# Patient Record
Sex: MALE | Race: WHITE | NOT HISPANIC OR LATINO | Employment: OTHER | URBAN - METROPOLITAN AREA
[De-identification: names, ages, dates, MRNs, and addresses within clinical notes are randomized per-mention and may not be internally consistent; named-entity substitution may affect disease eponyms.]

---

## 2018-01-02 ENCOUNTER — RECORDS - HEALTHEAST (OUTPATIENT)
Dept: LAB | Facility: CLINIC | Age: 68
End: 2018-01-02

## 2018-01-02 LAB
ALBUMIN SERPL-MCNC: 3.3 G/DL (ref 3.5–5)
ALP SERPL-CCNC: 80 U/L (ref 45–120)
ALT SERPL W P-5'-P-CCNC: 8 U/L (ref 0–45)
ANION GAP SERPL CALCULATED.3IONS-SCNC: 7 MMOL/L (ref 5–18)
AST SERPL W P-5'-P-CCNC: 13 U/L (ref 0–40)
BILIRUB SERPL-MCNC: 0.2 MG/DL (ref 0–1)
BUN SERPL-MCNC: 20 MG/DL (ref 8–22)
CALCIUM SERPL-MCNC: 9 MG/DL (ref 8.5–10.5)
CHLORIDE BLD-SCNC: 104 MMOL/L (ref 98–107)
CHOLEST SERPL-MCNC: 178 MG/DL
CO2 SERPL-SCNC: 27 MMOL/L (ref 22–31)
CREAT SERPL-MCNC: 1.17 MG/DL (ref 0.7–1.3)
FASTING STATUS PATIENT QL REPORTED: NO
GFR SERPL CREATININE-BSD FRML MDRD: >60 ML/MIN/1.73M2
GLUCOSE BLD-MCNC: 153 MG/DL (ref 70–125)
HDLC SERPL-MCNC: 39 MG/DL
LDLC SERPL CALC-MCNC: 99 MG/DL
POTASSIUM BLD-SCNC: 4.1 MMOL/L (ref 3.5–5)
PROT SERPL-MCNC: 6.4 G/DL (ref 6–8)
SODIUM SERPL-SCNC: 138 MMOL/L (ref 136–145)
T4 FREE SERPL-MCNC: 0.9 NG/DL (ref 0.7–1.8)
TRIGL SERPL-MCNC: 202 MG/DL
TSH SERPL DL<=0.005 MIU/L-ACNC: 0.87 UIU/ML (ref 0.3–5)
VIT B12 SERPL-MCNC: 549 PG/ML (ref 213–816)

## 2018-01-03 LAB
25(OH)D3 SERPL-MCNC: 22.4 NG/ML (ref 30–80)
SYPHILIS RPR SCREEN - HISTORICAL: NORMAL

## 2018-01-18 ENCOUNTER — TRANSFERRED RECORDS (OUTPATIENT)
Dept: HEALTH INFORMATION MANAGEMENT | Facility: CLINIC | Age: 68
End: 2018-01-18

## 2018-01-19 ENCOUNTER — AMBULATORY - HEALTHEAST (OUTPATIENT)
Dept: NEUROLOGY | Facility: CLINIC | Age: 68
End: 2018-01-19

## 2018-01-19 DIAGNOSIS — R41.3 MEMORY CHANGES: ICD-10-CM

## 2018-01-26 ENCOUNTER — HOSPITAL ENCOUNTER (OUTPATIENT)
Dept: NEUROLOGY | Facility: CLINIC | Age: 68
Setting detail: THERAPIES SERIES
Discharge: STILL ADMITTED OR ADMINISTRATIVE | End: 2018-01-26
Attending: CLINICAL NEUROPSYCHOLOGIST

## 2018-01-26 DIAGNOSIS — R41.3 MEMORY CHANGES: ICD-10-CM

## 2018-04-18 ENCOUNTER — RECORDS - HEALTHEAST (OUTPATIENT)
Dept: LAB | Facility: CLINIC | Age: 68
End: 2018-04-18

## 2018-04-18 ENCOUNTER — TRANSFERRED RECORDS (OUTPATIENT)
Dept: HEALTH INFORMATION MANAGEMENT | Facility: CLINIC | Age: 68
End: 2018-04-18

## 2018-04-18 LAB
C REACTIVE PROTEIN LHE: <0.1 MG/DL (ref 0–0.8)
ERYTHROCYTE [SEDIMENTATION RATE] IN BLOOD BY WESTERGREN METHOD: 11 MM/HR (ref 0–15)
RHEUMATOID FACT SERPL-ACNC: <15 IU/ML (ref 0–30)

## 2018-04-19 ENCOUNTER — TRANSFERRED RECORDS (OUTPATIENT)
Dept: HEALTH INFORMATION MANAGEMENT | Facility: CLINIC | Age: 68
End: 2018-04-19

## 2018-04-19 LAB — ANA SER QL: 0.4 U

## 2018-07-27 ENCOUNTER — OFFICE VISIT (OUTPATIENT)
Dept: FAMILY MEDICINE | Facility: CLINIC | Age: 68
End: 2018-07-27
Payer: MEDICARE

## 2018-07-27 VITALS
SYSTOLIC BLOOD PRESSURE: 171 MMHG | TEMPERATURE: 98.1 F | BODY MASS INDEX: 18.83 KG/M2 | OXYGEN SATURATION: 99 % | HEART RATE: 77 BPM | HEIGHT: 65 IN | DIASTOLIC BLOOD PRESSURE: 84 MMHG | WEIGHT: 113 LBS | RESPIRATION RATE: 12 BRPM

## 2018-07-27 DIAGNOSIS — F33.1 MODERATE EPISODE OF RECURRENT MAJOR DEPRESSIVE DISORDER (H): ICD-10-CM

## 2018-07-27 DIAGNOSIS — B18.2 CHRONIC HEPATITIS C WITHOUT HEPATIC COMA (H): ICD-10-CM

## 2018-07-27 DIAGNOSIS — I10 ESSENTIAL HYPERTENSION: ICD-10-CM

## 2018-07-27 DIAGNOSIS — Z00.00 ROUTINE GENERAL MEDICAL EXAMINATION AT A HEALTH CARE FACILITY: Primary | ICD-10-CM

## 2018-07-27 DIAGNOSIS — F41.1 GAD (GENERALIZED ANXIETY DISORDER): ICD-10-CM

## 2018-07-27 DIAGNOSIS — F43.10 POSTTRAUMATIC STRESS DISORDER: ICD-10-CM

## 2018-07-27 LAB
ALBUMIN SERPL-MCNC: 5.1 MG/DL (ref 3.3–4.9)
ALP SERPL-CCNC: 82.3 U/L (ref 40–150)
ALT SERPL-CCNC: 15.6 U/L (ref 0–45)
AST SERPL-CCNC: 14.8 U/L (ref 0–55)
BILIRUB SERPL-MCNC: 0.3 MG/DL (ref 0.2–1.3)
BUN SERPL-MCNC: 57.1 MG/DL (ref 7–21)
CALCIUM SERPL-MCNC: 10.2 MG/DL (ref 8.5–10.1)
CHLORIDE SERPLBLD-SCNC: 98.1 MMOL/L (ref 98–110)
CHOLEST SERPL-MCNC: 199 MG/DL (ref 0–200)
CHOLEST/HDLC SERPL: 3.6 {RATIO} (ref 0–5)
CO2 SERPL-SCNC: 26.2 MMOL/L (ref 20–32)
CREAT SERPL-MCNC: 2 MG/DL (ref 0.7–1.3)
GFR SERPL CREATININE-BSD FRML MDRD: 35.6 ML/MIN/1.7 M2
GLUCOSE SERPL-MCNC: 90 MG'DL (ref 70–99)
HCT VFR BLD AUTO: 45.8 % (ref 40–53)
HDLC SERPL-MCNC: 54.6 MG/DL
HEMOGLOBIN: 15 G/DL (ref 13.3–17.7)
LDLC SERPL CALC-MCNC: 115 MG/DL (ref 0–129)
MAGNESIUM SERPL-MCNC: 2.7 MG/DL (ref 1.8–2.6)
MCH RBC QN AUTO: 29.9 PG (ref 26.5–35)
MCHC RBC AUTO-ENTMCNC: 32.8 G/DL (ref 32–36)
MCV RBC AUTO: 91.4 FL (ref 78–100)
PLATELET # BLD AUTO: 263 K/UL (ref 150–450)
POTASSIUM SERPL-SCNC: 4 MMOL/DL (ref 3.2–4.6)
PROT SERPL-MCNC: 7.9 G/DL (ref 6.8–8.8)
RBC # BLD AUTO: 5 M/UL (ref 4.4–5.9)
SODIUM SERPL-SCNC: 135.9 MMOL/L (ref 132–142)
TRIGL SERPL-MCNC: 148.9 MG/DL (ref 0–150)
TSH SERPL DL<=0.05 MIU/L-ACNC: 1.35 UIU/ML (ref 0.3–5)
VLDL CHOLESTEROL: 29.8 MG/DL (ref 7–32)
WBC # BLD AUTO: 8.4 K/UL (ref 4–11)

## 2018-07-27 RX ORDER — HYDROXYZINE PAMOATE 25 MG/1
25 CAPSULE ORAL 3 TIMES DAILY PRN
Qty: 90 CAPSULE | Refills: 0 | Status: SHIPPED | OUTPATIENT
Start: 2018-07-27 | End: 2018-08-20

## 2018-07-27 RX ORDER — SERTRALINE HYDROCHLORIDE 25 MG/1
TABLET, FILM COATED ORAL
Qty: 60 TABLET | Refills: 0 | Status: SHIPPED | OUTPATIENT
Start: 2018-07-27 | End: 2018-08-20

## 2018-07-27 RX ORDER — HYDROCHLOROTHIAZIDE 25 MG/1
25 TABLET ORAL
COMMUNITY
Start: 2018-07-23 | End: 2018-08-28

## 2018-07-27 RX ORDER — OMEGA-3 FATTY ACIDS/FISH OIL 300-1000MG
800 CAPSULE ORAL EVERY 6 HOURS PRN
COMMUNITY
End: 2020-10-12

## 2018-07-27 RX ORDER — PRAZOSIN HYDROCHLORIDE 1 MG/1
1 CAPSULE ORAL AT BEDTIME
Qty: 30 CAPSULE | Refills: 0 | Status: SHIPPED | OUTPATIENT
Start: 2018-07-27 | End: 2018-08-21

## 2018-07-27 ASSESSMENT — ANXIETY QUESTIONNAIRES
5. BEING SO RESTLESS THAT IT IS HARD TO SIT STILL: NEARLY EVERY DAY
7. FEELING AFRAID AS IF SOMETHING AWFUL MIGHT HAPPEN: NEARLY EVERY DAY
2. NOT BEING ABLE TO STOP OR CONTROL WORRYING: NEARLY EVERY DAY
3. WORRYING TOO MUCH ABOUT DIFFERENT THINGS: NEARLY EVERY DAY
GAD7 TOTAL SCORE: 21
IF YOU CHECKED OFF ANY PROBLEMS ON THIS QUESTIONNAIRE, HOW DIFFICULT HAVE THESE PROBLEMS MADE IT FOR YOU TO DO YOUR WORK, TAKE CARE OF THINGS AT HOME, OR GET ALONG WITH OTHER PEOPLE: EXTREMELY DIFFICULT
1. FEELING NERVOUS, ANXIOUS, OR ON EDGE: NEARLY EVERY DAY
6. BECOMING EASILY ANNOYED OR IRRITABLE: NEARLY EVERY DAY

## 2018-07-27 ASSESSMENT — PATIENT HEALTH QUESTIONNAIRE - PHQ9: 5. POOR APPETITE OR OVEREATING: NEARLY EVERY DAY

## 2018-07-27 NOTE — MR AVS SNAPSHOT
After Visit Summary   7/27/2018    Agustin Chaudhry    MRN: [id number]           Patient Information     Date Of Birth          1950        Visit Information        Provider Department      7/27/2018 1:10 PM Brandi French,  [facility]        Today's Diagnoses     Routine general medical examination at a health care facility    -  1    Essential hypertension        Chronic hepatitis C without hepatic coma (H)        Moderate episode of recurrent major depressive disorder (H)        Posttraumatic stress disorder        WANG (generalized anxiety disorder)          Care Instructions    Melatonin over the counter could be helpful.  It is a natural hormone that your body secretes.  It would not make you overly drowsy.    We will draw some labs today to evaluate greene identifiable causes of your spasms and other symptoms.  We will talk about these results at your follow-up visit.    I started you on sertraline 25 mg for 1 week, and if you are tolerating this okay you can go up to 50 mg after 7 days and stay at this dose.    Hydroxyzine 25-50 mg up to 3 times daily as needed for increased anxiety.  This is a medicine that can make you sleepy.    Prazosin 1 mg at bedtime.  This is a medication that should help with your nightmares.    Please return in approximately 2 weeks for follow-up.  At that time we will discuss how your mood is, as well as the lab results from today.  We will also request records from your prior clinic, and hopefully I will have those by her next visit.    Please a me know if there is anything else that I can do for you in the meantime.  If anything changes or worsens in the meantime, please call the clinic or make an appointment for sooner.              Follow-ups after your visit        Who to contact     Please call your clinic at [phone] to:    Ask questions about your health    Make or cancel appointments    Discuss your medicines    Learn about your test  "results    Speak to your doctor            Additional Information About Your Visit        [facility] Inchart Information     Whisper is an electronic gateway that provides easy, online access to your medical records. With Whisper, you can request a clinic appointment, read your test results, renew a prescription or communicate with your care team.     To sign up for Whisper visit the website at [email or web address]   You will be asked to enter the access code listed below, as well as some personal information. Please follow the directions to create your username and password.     Your access code is: [email or web address]  Expires: 10/25/2018  2:14 PM     Your access code will  in 90 days. If you need help or a new code, please contact your [facility] or call [phone] for assistance.        Care EveryWhere ID     This is your Care EveryWhere ID. This could be used by other organizations to access your [place] medical records  [id number]        Your Vitals Were     Pulse Temperature Respirations Height Pulse Oximetry BMI (Body Mass Index)    77 98.1  F (36.7  C) (Oral) 12 5' 4.5\" (163.8 cm) 99% 19.1 kg/m2       Blood Pressure from Last 3 Encounters:   18 171/84    Weight from Last 3 Encounters:   18 113 lb (51.3 kg)              We Performed the Following     CBC with Plt ([facility])     Comprehensive Metabolic Panel ([facility])     Lipid Panel ([facility])     Magnesium ([facility])     Thyroid [place] ([facility])          Today's Medication Changes          These changes are accurate as of 18  2:14 PM.  If you have any questions, ask your nurse or doctor.               Start taking these medicines.        Dose/Directions    hydrOXYzine 25 MG capsule   Commonly known as:  VISTARIL   Used for:  Posttraumatic stress disorder, Moderate episode of recurrent major depressive disorder (H), WANG (generalized anxiety disorder)   Started by:  Brandi French, DO        Dose:  25 mg "   Take 1 capsule (25 mg) by mouth 3 times daily as needed for anxiety   Quantity:  90 capsule   Refills:  0       prazosin 1 MG capsule   Commonly known as:  MINIPRESS   Used for:  Posttraumatic stress disorder   Started by:  Brandi French DO        Dose:  1 mg   Take 1 capsule (1 mg) by mouth At Bedtime   Quantity:  30 capsule   Refills:  0       sertraline 25 MG tablet   Commonly known as:  ZOLOFT   Used for:  Moderate episode of recurrent major depressive disorder (H), Posttraumatic stress disorder   Started by:  Brandi French DO        25 mg (1 tablet) for 7 days, then increase to 50 mg (2 tablets) daily.   Quantity:  60 tablet   Refills:  0            Where to get your medicines      These medications were sent to [id number] - [place], [place] - [address], [place] [address]     Phone:  [phone]     hydrOXYzine 25 MG capsule    prazosin 1 MG capsule    sertraline 25 MG tablet                Primary Care Provider Office Phone # Fax #    Brandi French [phone]       [address] [place] [address]        Equal Access to Services     STEW JOSEPH AH: Hadii cinthya barajas hadasho Soomaali, waaxda luqadaha, qaybta kaalmada adeegyada, vandana price. So [facility] [phone].    ATENCIÓN: Si habla español, tiene a gilmore disposición servicios gratuitos de asistencia lingüística. [facility] [phone].    We comply with applicable federal civil rights laws and [place] laws. We do not discriminate on the basis of race, color, national origin, age, disability, sex, sexual orientation, or gender identity.            Thank you!     Thank you for choosing [facility]  for your care. Our goal is always to provide you with excellent care. Hearing back from our patients is one way we can continue to improve our services. Please take a few minutes to complete the written survey that  you may receive in the mail after your visit with us. Thank you!             Your Updated Medication List - Protect others around you: Learn how to safely use, store and throw away your medicines at www.disposemymeds.org.          This list is accurate as of 7/27/18  2:14 PM.  Always use your most recent med list.                   Brand Name Dispense Instructions for use Diagnosis    hydrochlorothiazide 25 MG tablet    HYDRODIURIL     Take 25 mg by mouth        hydrOXYzine 25 MG capsule    VISTARIL    90 capsule    Take 1 capsule (25 mg) by mouth 3 times daily as needed for anxiety    Posttraumatic stress disorder, Moderate episode of recurrent major depressive disorder (H), WANG (generalized anxiety disorder)       ibuprofen 200 MG capsule      Take 800 mg by mouth every 6 hours as needed        prazosin 1 MG capsule    MINIPRESS    30 capsule    Take 1 capsule (1 mg) by mouth At Bedtime    Posttraumatic stress disorder       sertraline 25 MG tablet    ZOLOFT    60 tablet    25 mg (1 tablet) for 7 days, then increase to 50 mg (2 tablets) daily.    Moderate episode of recurrent major depressive disorder (H), Posttraumatic stress disorder

## 2018-07-27 NOTE — PROGRESS NOTES
SUBJECTIVE       Agustin Chaudhry is a 68 year old  male with a PMHx significant for:   Patient Active Problem List   Diagnosis     Hypertension     Chronic hepatitis C without hepatic coma (H)     Moderate episode of recurrent major depressive disorder (H)     Posttraumatic stress disorder     Patient is here today to establish care as he has not been seen by a doctor in some time.    Was seen in ED on 7/23/18 for uncontrolled HTN.  Was incidentally found to have HTN at a dentist visit.  Was discharged with hydrochlorothiazide prescription and recommendations to follow up to establish care with a primary doctor.  He had been on medications in the past for his blood pressure, but once he was started taking care of his parents he let his health go to the [place].  He has been taking his blood pressure, and his last 3 readings were 132/88, 131/84, and 143/95.  He takes his blood pressure daily.  He has no new symptoms, but does get daily headaches.  No chest pain or shortness of breath.     he has been living in the [place] area for at least the last 8 years.  He had been the primary caregiver for his parents during that time.  For the last 4 years in their lives he was there still provider, and needed PCA services to help take care of his parents.  He reports that he lost both his parents within about 1-2 months of each other last fall.  At that time he also lost his best friend.  He has been struggling quite a bit with increased depression.  He notes that he was diagnosed with PTSD proximal 20 years ago in a specialty center and for sleep.  He is also battled with depression throughout most of his life.  He has been on multiple medications including Prozac and Zoloft which he did not care for much.  He reports that his parents did  seem to notice a change in his mood when he was on medications.  He denies any suicidal ideation, or plan to hurt himself.  He says that he is very stressed right now as his  siblings are taking him through court in regards to him being the sole inherited all of his family state.  He states he has one more court hearing coming up, and then everything will be finished.  He is interested in starting medication, as he feels that he cannot handle everything going on right now.  He is not currently interested in therapy.  He tells me that it is hard enough for him to talk about this with me, and does not like to restage his story all the time.    He has had headaches for years that usually begin at the base of the neck and radiate up into both sides of his head.  He notes that stress seems to make his headaches worse.  His most recent exacerbation of his headaches was approximately 4 days ago that was associated with blurry vision and nausea.  Hot showers tend to help with not only has neck pain, but also has headaches.  He takes ibuprofen daily which takes the edge off, but does not significantly improve his headaches.  No unilateral weakness, speech or mentation troubles with his headaches.  They have not changes in the recent past.      He also tells me about his significant issues with not being able to get restful sleep.  He notes that he cannot remember ever having a time where he is slept through the entire night.  He reports that he wakes up every 2 hours on the hour.  He also notes terrible nightmares, almost nightly.  He also has an issue with hand and leg spasms and cramps.  He is currently seeing a hand surgeon in being worked up in regards to any nerve issues with this.  He notes he has significant problems with his cervical spine has had previous imaging of this.  He is unsure of what is exactly going on with his lower extremity symptoms.    Finally, he also notes that he has been having difficulties with short-term memory.  He notes that he will make a mental notes to go do something, but by the time he has walked into the room where something needs to be done he has completely  "forgotten about it.  He soon then remembers what he is should have been doing.  He denies leaving the stove or grill on.  He has no issues with erections.  He notes that this is been an ongoing thing for some time, but believes this to be getting worse recently.  He is hopeful that down the line he can have his cognition further looked into.    Patient speaks English and so an  was not used.    ROS: As stated in HPI.    PMH, Medications and Allergies were reviewed and updated as needed.        OBJECTIVE     Vitals:    07/27/18 1317   BP: 171/84   BP Location: Left arm   Patient Position: Sitting   Cuff Size: Adult Regular   Pulse: 77   Resp: 12   Temp: 98.1  F (36.7  C)   TempSrc: Oral   SpO2: 99%   Weight: 113 lb (51.3 kg)   Height: 5' 4.5\" (163.8 cm)     Body mass index is 19.1 kg/(m^2).    Gen:  NAD, thin and gaunt gentleman who is pleasant and coopertive  HEENT: mucous membranes moist. EOMI, sclera non-icteric, nares patent  Neck: supple without lymphadenopathy, trachea midline; paraspinal tenderness to palpation of musculature, ROM decreased with rotation right and left, but unchanged from baseline  CV:  RRR  - no murmurs, rubs, or gallups  Pulm:  CTAB, no wheezes/rales/rhonchi  GI: soft, nontender, no masses, no rebound, BS intact throughout  Neuro: grossly normal, speech is clear and coherent.  Logical thought process.  No focal deficits.  Psych: Mood depressed, but labile.  Tearful with talking about the loss of his parents and friends.  Flat affect.  Dressed appropriately for the season. No hallucinations.    PHQ-9 SCORE 7/27/2018   Total Score 17     WANG-7 SCORE 7/27/2018   Total Score 21     Results for orders placed or performed in visit on 07/27/18 (from the past 24 hour(s))   CBC with Plt ([facility])   Result Value Ref Range    WBC 8.4 4.0 - 11.0 K/uL    RBC 5.0 4.4 - 5.9 M/uL    Hemoglobin 15.0 13.3 - 17.7 g/dL    Hematocrit 45.8 40.0 - 53.0 %    MCV 91.4 78.0 - 100.0 fL    MCH 29.9 26.5 - " 35.0    MCHC 32.8 32.0 - 36.0 g/dL    Platelets 263.0 150.0 - 450.0 K/uL     ASSESSMENT AND PLAN     Agustin was seen today for hypertension, follow up for and establish care.    Diagnoses and all orders for this visit:    Routine general medical examination at a health care facility  Essential hypertension  Chronic hepatitis C without hepatic coma (H)  -     Thyroid [place] ([facility])  -     Comprehensive Metabolic Panel ([facility])  -     CBC with Plt ([facility])  -     Magnesium ([facility])  -     Lipid Panel ([facility])    Moderate episode of recurrent major depressive disorder (H)  -     sertraline (ZOLOFT) 25 MG tablet; 25 mg (1 tablet) for 7 days, then increase to 50 mg (2 tablets) daily.  -     hydrOXYzine (VISTARIL) 25 MG capsule; Take 1 capsule (25 mg) by mouth 3 times daily as needed for anxiety    Posttraumatic stress disorder  -     sertraline (ZOLOFT) 25 MG tablet; 25 mg (1 tablet) for 7 days, then increase to 50 mg (2 tablets) daily.  -     hydrOXYzine (VISTARIL) 25 MG capsule; Take 1 capsule (25 mg) by mouth 3 times daily as needed for anxiety  -     prazosin (MINIPRESS) 1 MG capsule; Take 1 capsule (1 mg) by mouth At Bedtime    WANG (generalized anxiety disorder)  -     hydrOXYzine (VISTARIL) 25 MG capsule; Take 1 capsule (25 mg) by mouth 3 times daily as needed for anxiety    Discussed at great length, but there are many things going on right now and we will continue to chip away at all of these with each return visit.  Headaches sound consistent with tension headaches that are exacerbated with worsened mood and stress with his siblings.  Will continue to monitor these for any changes, but will plan to work on further evaluation when his mood is improved and has less external stressors.      Will likely need some cognitive testing in the future to determine any degree of dementia or other causes of his worsening short term memory.      Baseline labs done today, and will be discussed at follow up visit.   Discussed common side effects with him in regard to new medications.  Will continue to work on improving his sleep, and may consider sleep study in the future.      RTC in 2 weeks for follow up of mood, or sooner if develops new or worsening symptoms.    Discussed with MD Brandi Conley, PGY-3

## 2018-07-27 NOTE — PATIENT INSTRUCTIONS
Melatonin over the counter could be helpful.  It is a natural hormone that your body secretes.  It would not make you overly drowsy.    We will draw some labs today to evaluate greene identifiable causes of your spasms and other symptoms.  We will talk about these results at your follow-up visit.    I started you on sertraline 25 mg for 1 week, and if you are tolerating this okay you can go up to 50 mg after 7 days and stay at this dose.    Hydroxyzine 25-50 mg up to 3 times daily as needed for increased anxiety.  This is a medicine that can make you sleepy.    Prazosin 1 mg at bedtime.  This is a medication that should help with your nightmares.    Please return in approximately 2 weeks for follow-up.  At that time we will discuss how your mood is, as well as the lab results from today.  We will also request records from your prior clinic, and hopefully I will have those by her next visit.    Please a me know if there is anything else that I can do for you in the meantime.  If anything changes or worsens in the meantime, please call the clinic or make an appointment for sooner.

## 2018-07-28 ASSESSMENT — ANXIETY QUESTIONNAIRES: GAD7 TOTAL SCORE: 21

## 2018-07-28 ASSESSMENT — PATIENT HEALTH QUESTIONNAIRE - PHQ9: SUM OF ALL RESPONSES TO PHQ QUESTIONS 1-9: 17

## 2018-08-03 ENCOUNTER — TELEPHONE (OUTPATIENT)
Dept: FAMILY MEDICINE | Facility: CLINIC | Age: 68
End: 2018-08-03

## 2018-08-03 NOTE — TELEPHONE ENCOUNTER
[facility] phone call message- general phone call:    Reason for call: He was seen on 8/1/18 by  he had some labs that were drawn and he need those fax over to  at [facility] he has an appointment with him on Tuesday 8/7/18.    Return call needed: Yes    OK to leave a message on voice mail? Yes    Primary language: English      needed? No    Call taken on August 3, 2018 at 4:24 PM by Princess Reeves

## 2018-08-06 NOTE — TELEPHONE ENCOUNTER
Pt called back and notified him of the message below. Pt understood and was okay with it. Patricia Caraballo MA

## 2018-08-06 NOTE — TELEPHONE ENCOUNTER
Note for PCS: Pt will need [facility] filled out for us to fax labs to  since he is a new pt. But Dr. King can also access labs through care everywhere in Epic. Patricia Caraballo MA

## 2018-08-08 ENCOUNTER — OFFICE VISIT (OUTPATIENT)
Dept: FAMILY MEDICINE | Facility: CLINIC | Age: 68
End: 2018-08-08
Payer: MEDICARE

## 2018-08-08 VITALS
HEART RATE: 86 BPM | OXYGEN SATURATION: 97 % | TEMPERATURE: 98.5 F | DIASTOLIC BLOOD PRESSURE: 75 MMHG | RESPIRATION RATE: 16 BRPM | SYSTOLIC BLOOD PRESSURE: 139 MMHG | WEIGHT: 115.4 LBS | BODY MASS INDEX: 19.5 KG/M2

## 2018-08-08 DIAGNOSIS — G62.9 NEUROPATHY: Primary | ICD-10-CM

## 2018-08-08 DIAGNOSIS — M79.10 MYALGIA: ICD-10-CM

## 2018-08-08 LAB
CK SERPL-CCNC: 66 U/L (ref 30–190)
CRP SERPL-MCNC: <0.1 MG/DL (ref 0–0.8)
ERYTHROCYTE [SEDIMENTATION RATE] IN BLOOD: 17 MM/HR (ref 0–15)

## 2018-08-08 RX ORDER — GABAPENTIN 300 MG/1
CAPSULE ORAL
Qty: 180 CAPSULE | Refills: 0 | Status: SHIPPED | OUTPATIENT
Start: 2018-08-08 | End: 2018-09-07

## 2018-08-08 NOTE — PROGRESS NOTES
"     SUBJECTIVE       Agustin Chaudhry is a 68 year old  male with a PMHx significant for:   Patient Active Problem List   Diagnosis     Hypertension     Chronic hepatitis C without hepatic coma (H)     Moderate episode of recurrent major depressive disorder (H)     Posttraumatic stress disorder     Patient is here in follow-up of blood pressure, as well as his depression.  He is very irritated as he was supposed to have his last court date today.  He tells me that the  postponed this so we can further evaluate the case.  He was really hoping for closure.    He is also very upset with his neurologist who he saw yesterday.  He states that there is nothing that he can do when he is since referred to a spine surgeon to discuss possible surgery, or need for different pain management.  He tells me that he is very frustrated with feeling \"shuffle\" between all different doctors who cannot tell him why he continues to have peripheral extremity spasms.  He tells me that he has had these spasms for years.  His right side of his body spasms more than his left.  It is usually always from his knees down, or his elbows down.  They happen intermittently and at random times.  He notes that they are the worse when they come on when he is trying to lay down and go to sleep.  If he starts having cramping when he lays down to go to sleep he is unable to sleep.  He feels as though he can fall asleep easily if he does not have any cramping.  The spasms are intermittent and last for a few seconds, but he has residual aching pains throughout the entire day.  He does not feel that this affects his strength.  He notes that his most recent AVS from his neurologist shows gabapentin 300 mg daily and he did not know that this was prescribed for him.  He has questions about taking this and if it will be helpful for him.  He notes persistent numbness and tingling in both hands, but right is worse than left.      In regards to his depression, " he is not very pleased that he was started on Zoloft as he has not liked being on this in the past.  He is still taking it, and is currently taking 50 mg daily.  He does report that it has been beneficial.  He feels that he is getting more things done throughout the day.  He feels less labile in his mood.  He is using Vistaril 50 mg 3 times daily, which she feels is helpful for his anxiety.  He does not feel that this makes him overly tired.  He tried using melatonin for sleep for a few days, but did not like the way this made him feel.    He does tell me that he has a history of Crohn's disease that has been in remission since he was treated for his hepatitis C infection.  He feels as though his bowel movements are regular.  He tells me his mom had ulcerative colitis.  He does not believe that he is ever been worked up for any other rheumatologic condition.  He tells me that he has never seen a rheumatologist that he is aware of.    Patient speaks English and so an  was not used.    ROS: As stated in HPI.    PMH, Medications and Allergies were reviewed and updated as needed.        OBJECTIVE     Vitals:    08/08/18 1336   BP: 139/75   Pulse: 86   Resp: 16   Temp: 98.5  F (36.9  C)   TempSrc: Oral   SpO2: 97%   Weight: 115 lb 6.4 oz (52.3 kg)     Body mass index is 19.5 kg/(m^2).    Gen:  NAD, thin, but well-developed gentleman who is irritable but cooperative  HEENT: mucous membranes moist. EOMI, sclera non-icteric, nares patent  CV:  RRR  - no murmurs, rubs, or gallups  Pulm:  CTAB, no wheezes/rales/rhonchi  GI: soft, nontender, no masses, no rebound, BS intact throughout  MSK: Normal-appearing upper extremities.  No swelling or erythema.  No atrophy appreciated.  Full active range of motion.  Strength 5/5 with wrist flexion, extension, radial and ulnar deviation.  Strength 5/5 with thumb opposition.  Sensation intact to light touch.   strength equal and symmetric.  Negative Tinel's at the wrist  bilaterally.  Radial pulse 2+ bilaterally.  Psych: Mood and affect appropriate    No results found for this or any previous visit (from the past 24 hour(s)).    ASSESSMENT AND PLAN     Agustin was seen today for recheck medication, results and colonoscopy.    Diagnoses and all orders for this visit:    Neuropathy  -     gabapentin (NEURONTIN) 300 MG capsule; Take 1 tablet (300) mg at bedtime for 2 weeks, then increase to 2 tablets (600 mg).  -     Erythrocyte Sed Rate ([facility])  -     C-Reactive Protein ([facility])    Myalgia  -     Erythrocyte Sed Rate ([facility])  -     C-Reactive Protein ([facility])  -     CK Total ([facility])    I do feel that his depression is improved from the first time I met him.  He is less labile.  He was able to talk about his parents without becoming tearful.  He continues to have many social stressors that I feel are impacting his health and his symptoms.    With regards to his muscle spasms, the lab work that we did at his last visit are relatively unremarkable.  There is no obvious reason for his muscle spasms.  They do not follow a specific pattern.  It does not sound like arthralgias.  His neurologic exam is benign.  He was just seen by neurology yesterday, and they continue to have no definitive etiology.  He has had an EMG of both upper extremities that just demonstrated mild left carpal tunnel syndrome.  He does have an MRI of his cervical spine which shows mild to moderate cervical spondylosis.  There is mild narrowing of the canal at C3-C4 and C5-C6.  There is marked left C3-C4 through C5-C6 foraminal narrowing.  I do agree that he should be evaluated by a spine surgeon and have further workup with respect to that.  I do not feel that this can be fully ruled out as a cause for his problems.  Will check lab work as noted above to be sure that there is not any myositis or other inflammatory process possibly causing his symptoms.  At this time if there is no clear definitive  etiology for this.    RTC in 4 weeks for follow up of spasms, or sooner if develops new or worsening symptoms.    Discussed with MD Brandi Leavitt, PGY-3

## 2018-08-08 NOTE — PROGRESS NOTES
Preceptor Attestation:   Patient seen, evaluated and discussed with the resident. I have verified the content of the note, which accurately reflects my assessment of the patient and the plan of care.   Supervising Physician:  Robert Gauthier MD

## 2018-08-08 NOTE — MR AVS SNAPSHOT
After Visit Summary   2018    Agustin Chaudhry    MRN: [id number]           Patient Information     Date Of Birth          1950        Visit Information        Provider Department      2018 1:30 PM Brandi French,  [facility]        Today's Diagnoses     Neuropathy    -  1    Myalgia          Care Instructions    Gabapentin: 300 mg at bedtime for 2 weeks then increase to 600 mg at bedtime until I see you again.    We will check a CPK, ESR and CRP -- these look more into muscle inflammation and irritation.    Keep taking all other medications as prescribed.      Return in 1 month for follow up.    I still think it is important to follow up with the spine surgeon.            Follow-ups after your visit        Who to contact     Please call your clinic at [phone] to:    Ask questions about your health    Make or cancel appointments    Discuss your medicines    Learn about your test results    Speak to your doctor            Additional Information About Your Visit        MyChart Information     [employer] is an electronic gateway that provides easy, online access to your medical records. With [employer], you can request a clinic appointment, read your test results, renew a prescription or communicate with your care team.     To sign up for [employer] visit the website at [email or web address]   You will be asked to enter the access code listed below, as well as some personal information. Please follow the directions to create your username and password.     Your access code is: [email or web address]  Expires: 10/25/2018  2:14 PM     Your access code will  in 90 days. If you need help or a new code, please contact your [facility] or call [phone] for assistance.        Care EveryWhere ID     This is your Care EveryWhere ID. This could be used by other organizations to access your [place] medical records  [id number]        Your Vitals Were     Pulse  Temperature Respirations Pulse Oximetry BMI (Body Mass Index)       86 98.5  F (36.9  C) (Oral) 16 97% 19.5 kg/m2        Blood Pressure from Last 3 Encounters:   08/08/18 139/75   07/27/18 171/84    Weight from Last 3 Encounters:   08/08/18 115 lb 6.4 oz (52.3 kg)   07/27/18 113 lb (51.3 kg)              We Performed the Following     C-Reactive Protein ([facility])     CK total     Erythrocyte Sed Rate ([facility])          Today's Medication Changes          These changes are accurate as of 8/8/18  2:33 PM.  If you have any questions, ask your nurse or doctor.               Start taking these medicines.        Dose/Directions    gabapentin 300 MG capsule   Commonly known as:  NEURONTIN   Used for:  Neuropathy   Started by:  Brandi French, DO        Take 1 tablet (300) mg at bedtime for 2 weeks, then increase to 2 tablets (600 mg).   Quantity:  180 capsule   Refills:  0            Where to get your medicines      These medications [id number] - [place], [place] - [address], [place] [address]     Phone:  [phone]     gabapentin 300 MG capsule                Primary Care Provider Office Phone # Fax #    Brandi French [phone]       [address] [place] [address]        Equal Access to Services     STEW JOSEPH AH: Main blacko Sopatricia, waaxda luqadaha, qaybta kaalmada jack, vandana price. So [facility] [phone].    ATENCIÓN: Si habla español, tiene a gilmore disposición servicios gratuitos de asistencia lingüística. Jami al [phone].    We comply with applicable federal civil rights laws and [place] laws. We do not discriminate on the basis of race, color, national origin, age, disability, sex, sexual orientation, or gender identity.            Thank you!     Thank you for choosing [facility]  for your care. Our goal is always to provide  you with excellent care. Hearing back from our patients is one way we can continue to improve our services. Please take a few minutes to complete the written survey that you may receive in the mail after your visit with us. Thank you!             Your Updated Medication List - Protect others around you: Learn how to safely use, store and throw away your medicines at www.disposemymeds.org.          This list is accurate as of 8/8/18  2:33 PM.  Always use your most recent med list.                   Brand Name Dispense Instructions for use Diagnosis    gabapentin 300 MG capsule    NEURONTIN    180 capsule    Take 1 tablet (300) mg at bedtime for 2 weeks, then increase to 2 tablets (600 mg).    Neuropathy       hydrochlorothiazide 25 MG tablet    HYDRODIURIL     Take 25 mg by mouth        hydrOXYzine 25 MG capsule    VISTARIL    90 capsule    Take 1 capsule (25 mg) by mouth 3 times daily as needed for anxiety    Posttraumatic stress disorder, Moderate episode of recurrent major depressive disorder (H), WANG (generalized anxiety disorder)       ibuprofen 200 MG capsule      Take 800 mg by mouth every 6 hours as needed        prazosin 1 MG capsule    MINIPRESS    30 capsule    Take 1 capsule (1 mg) by mouth At Bedtime    Posttraumatic stress disorder       sertraline 25 MG tablet    ZOLOFT    60 tablet    25 mg (1 tablet) for 7 days, then increase to 50 mg (2 tablets) daily.    Moderate episode of recurrent major depressive disorder (H), Posttraumatic stress disorder

## 2018-08-08 NOTE — PATIENT INSTRUCTIONS
Gabapentin: 300 mg at bedtime for 2 weeks then increase to 600 mg at bedtime until I see you again.    We will check a CPK, ESR and CRP -- these look more into muscle inflammation and irritation.    Keep taking all other medications as prescribed.      Return in 1 month for follow up.    I still think it is important to follow up with the spine surgeon.

## 2018-08-20 DIAGNOSIS — F41.1 GAD (GENERALIZED ANXIETY DISORDER): ICD-10-CM

## 2018-08-20 DIAGNOSIS — F43.10 POSTTRAUMATIC STRESS DISORDER: ICD-10-CM

## 2018-08-20 DIAGNOSIS — F33.1 MODERATE EPISODE OF RECURRENT MAJOR DEPRESSIVE DISORDER (H): ICD-10-CM

## 2018-08-20 RX ORDER — HYDROXYZINE PAMOATE 25 MG/1
25 CAPSULE ORAL 3 TIMES DAILY PRN
Qty: 90 CAPSULE | Refills: 3 | Status: SHIPPED | OUTPATIENT
Start: 2018-08-20 | End: 2018-09-07

## 2018-08-20 RX ORDER — SERTRALINE HYDROCHLORIDE 25 MG/1
TABLET, FILM COATED ORAL
Qty: 60 TABLET | Refills: 3 | Status: SHIPPED | OUTPATIENT
Start: 2018-08-20 | End: 2018-09-07

## 2018-08-21 DIAGNOSIS — F43.10 POSTTRAUMATIC STRESS DISORDER: ICD-10-CM

## 2018-08-22 RX ORDER — PRAZOSIN HYDROCHLORIDE 1 MG/1
1 CAPSULE ORAL AT BEDTIME
Qty: 30 CAPSULE | Refills: 0 | Status: SHIPPED | OUTPATIENT
Start: 2018-08-22 | End: 2018-09-07

## 2018-08-28 DIAGNOSIS — I10 BENIGN ESSENTIAL HYPERTENSION: Primary | ICD-10-CM

## 2018-08-28 RX ORDER — HYDROCHLOROTHIAZIDE 25 MG/1
25 TABLET ORAL DAILY
Qty: 30 TABLET | Refills: 3 | Status: SHIPPED | OUTPATIENT
Start: 2018-08-28 | End: 2018-09-07

## 2018-09-07 ENCOUNTER — OFFICE VISIT (OUTPATIENT)
Dept: FAMILY MEDICINE | Facility: CLINIC | Age: 68
End: 2018-09-07
Payer: MEDICARE

## 2018-09-07 VITALS
RESPIRATION RATE: 16 BRPM | OXYGEN SATURATION: 96 % | TEMPERATURE: 98.2 F | DIASTOLIC BLOOD PRESSURE: 68 MMHG | BODY MASS INDEX: 20.11 KG/M2 | HEART RATE: 75 BPM | WEIGHT: 119 LBS | SYSTOLIC BLOOD PRESSURE: 169 MMHG

## 2018-09-07 DIAGNOSIS — G62.9 NEUROPATHY: ICD-10-CM

## 2018-09-07 DIAGNOSIS — I10 BENIGN ESSENTIAL HYPERTENSION: ICD-10-CM

## 2018-09-07 DIAGNOSIS — F33.1 MODERATE EPISODE OF RECURRENT MAJOR DEPRESSIVE DISORDER (H): ICD-10-CM

## 2018-09-07 DIAGNOSIS — F41.1 GAD (GENERALIZED ANXIETY DISORDER): ICD-10-CM

## 2018-09-07 DIAGNOSIS — F43.10 POSTTRAUMATIC STRESS DISORDER: ICD-10-CM

## 2018-09-07 RX ORDER — PRAZOSIN HYDROCHLORIDE 2 MG/1
2 CAPSULE ORAL AT BEDTIME
Qty: 30 CAPSULE | Refills: 3 | Status: SHIPPED | OUTPATIENT
Start: 2018-09-07 | End: 2019-01-29

## 2018-09-07 RX ORDER — SERTRALINE HYDROCHLORIDE 100 MG/1
100 TABLET, FILM COATED ORAL DAILY
Qty: 30 TABLET | Refills: 3 | Status: SHIPPED | OUTPATIENT
Start: 2018-09-07 | End: 2019-01-29

## 2018-09-07 RX ORDER — HYDROCHLOROTHIAZIDE 25 MG/1
25 TABLET ORAL DAILY
Qty: 30 TABLET | Refills: 3 | Status: SHIPPED | OUTPATIENT
Start: 2018-09-07 | End: 2019-01-29

## 2018-09-07 RX ORDER — HYDROXYZINE PAMOATE 50 MG/1
50 CAPSULE ORAL 3 TIMES DAILY PRN
Qty: 90 CAPSULE | Refills: 3 | Status: SHIPPED | OUTPATIENT
Start: 2018-09-07 | End: 2019-01-29

## 2018-09-07 RX ORDER — LISINOPRIL 5 MG/1
5 TABLET ORAL DAILY
Qty: 30 TABLET | Refills: 3 | Status: SHIPPED | OUTPATIENT
Start: 2018-09-07 | End: 2019-01-29

## 2018-09-07 RX ORDER — GABAPENTIN 300 MG/1
600 CAPSULE ORAL 2 TIMES DAILY PRN
Qty: 120 CAPSULE | Refills: 3 | Status: SHIPPED | OUTPATIENT
Start: 2018-09-07 | End: 2019-01-29

## 2018-09-07 NOTE — PROGRESS NOTES
Preceptor Attestation:   Patient seen, evaluated and discussed with the resident. I have verified the content of the note, which accurately reflects my assessment of the patient and the plan of care.   Supervising Physician:  Onofre Tatum MD

## 2018-09-07 NOTE — PATIENT INSTRUCTIONS
Moderate episode of recurrent major depressive disorder (H)  -     sertraline (ZOLOFT) 100 MG tablet; Take 1 tablet (100 mg) by mouth daily  -     hydrOXYzine (VISTARIL) 50 MG capsule; Take 1 capsule (50 mg) by mouth 3 times daily as needed for anxiety    Posttraumatic stress disorder  -     sertraline (ZOLOFT) 100 MG tablet; Take 1 tablet (100 mg) by mouth daily  -     prazosin (MINIPRESS) 2 MG capsule; Take 1 capsule (2 mg) by mouth At Bedtime  -     hydrOXYzine (VISTARIL) 50 MG capsule; Take 1 capsule (50 mg) by mouth 3 times daily as needed for anxiety    WANG (generalized anxiety disorder)  -     hydrOXYzine (VISTARIL) 50 MG capsule; Take 1 capsule (50 mg) by mouth 3 times daily as needed for anxiety    Benign essential hypertension  -     hydrochlorothiazide (HYDRODIURIL) 25 MG tablet; Take 1 tablet (25 mg) by mouth daily  -     lisinopril (PRINIVIL/ZESTRIL) 5 MG tablet; Take 1 tablet (5 mg) by mouth daily    Neuropathy  -     gabapentin (NEURONTIN) 300 MG capsule; Take 2 capsules (600 mg) by mouth 2 times daily as needed For nerve pain    All new prescriptions were sent to the pharmacy today.  If you feel that your Zoloft is still not doing all you feel it can in about 3-4 weeks, just call me and we can talk about increasing the dose a bit.  I want to see you back in about 2 months for recheck, or sooner if things change or worsen.

## 2018-09-07 NOTE — MR AVS SNAPSHOT
After Visit Summary   9/7/2018    Agustin Chaudhry    MRN: [id number]           Patient Information     Date Of Birth          1950        Visit Information        Provider Department      9/7/2018 4:30 PM Brandi French DO [facility]        Today's Diagnoses     Moderate episode of recurrent major depressive disorder (H)        Posttraumatic stress disorder        WANG (generalized anxiety disorder)        Benign essential hypertension        Neuropathy          Care Instructions      Moderate episode of recurrent major depressive disorder (H)  -     sertraline (ZOLOFT) 100 MG tablet; Take 1 tablet (100 mg) by mouth daily  -     hydrOXYzine (VISTARIL) 50 MG capsule; Take 1 capsule (50 mg) by mouth 3 times daily as needed for anxiety    Posttraumatic stress disorder  -     sertraline (ZOLOFT) 100 MG tablet; Take 1 tablet (100 mg) by mouth daily  -     prazosin (MINIPRESS) 2 MG capsule; Take 1 capsule (2 mg) by mouth At Bedtime  -     hydrOXYzine (VISTARIL) 50 MG capsule; Take 1 capsule (50 mg) by mouth 3 times daily as needed for anxiety    WANG (generalized anxiety disorder)  -     hydrOXYzine (VISTARIL) 50 MG capsule; Take 1 capsule (50 mg) by mouth 3 times daily as needed for anxiety    Benign essential hypertension  -     hydrochlorothiazide (HYDRODIURIL) 25 MG tablet; Take 1 tablet (25 mg) by mouth daily  -     lisinopril (PRINIVIL/ZESTRIL) 5 MG tablet; Take 1 tablet (5 mg) by mouth daily    Neuropathy  -     gabapentin (NEURONTIN) 300 MG capsule; Take 2 capsules (600 mg) by mouth 2 times daily as needed For nerve pain    All new prescriptions were sent to the pharmacy today.  If you feel that your Zoloft is still not doing all you feel it can in about 3-4 weeks, just call me and we can talk about increasing the dose a bit.  I want to see you back in about 2 months for recheck, or sooner if things change or worsen.            Follow-ups after your visit        Who to contact     Please  call your clinic at [phone] to:    Ask questions about your health    Make or cancel appointments    Discuss your medicines    Learn about your test results    Speak to your doctor            Additional Information About Your Visit        MyChart Information     [employer] is an electronic gateway that provides easy, online access to your medical records. With [employer], you can request a clinic appointment, read your test results, renew a prescription or communicate with your care team.     To sign up for [employer] visit the website at [email or web address]   You will be asked to enter the access code listed below, as well as some personal information. Please follow the directions to create your username and password.     Your access code is: [email or web address]  Expires: 10/25/2018  2:14 PM     Your access code will  in 90 days. If you need help or a new code, please contact your [facility] or call [phone] for assistance.        Care EveryWhere ID     This is your Care EveryWhere ID. This could be used by other organizations to access your [place] medical records  [id number]        Your Vitals Were     Pulse Temperature Respirations Pulse Oximetry BMI (Body Mass Index)       75 98.2  F (36.8  C) (Oral) 16 96% 20.11 kg/m2        Blood Pressure from Last 3 Encounters:   18 169/68   18 139/75   18 171/84    Weight from Last 3 Encounters:   18 119 lb (54 kg)   18 115 lb 6.4 oz (52.3 kg)   18 113 lb (51.3 kg)              Today, you had the following     No orders found for display         Today's Medication Changes          These changes are accurate as of 18  5:24 PM.  If you have any questions, ask your nurse or doctor.               Start taking these medicines.        Dose/Directions    lisinopril 5 MG tablet   Commonly known as:  PRINIVIL/ZESTRIL   Used for:  Benign essential hypertension   Started by:  Brandi French DO         Dose:  5 mg   Take 1 tablet (5 mg) by mouth daily   Quantity:  30 tablet   Refills:  3         These medicines have changed or have updated prescriptions.        Dose/Directions    gabapentin 300 MG capsule   Commonly known as:  NEURONTIN   This may have changed:    - how much to take  - how to take this  - when to take this  - reasons to take this  - additional instructions   Used for:  Neuropathy   Changed by:  Brandi French DO        Dose:  600 mg   Take 2 capsules (600 mg) by mouth 2 times daily as needed For nerve pain   Quantity:  120 capsule   Refills:  3       hydrOXYzine 50 MG capsule   Commonly known as:  VISTARIL   This may have changed:    - medication strength  - how much to take   Used for:  Posttraumatic stress disorder, Moderate episode of recurrent major depressive disorder (H), WANG (generalized anxiety disorder)   Changed by:  Brandi French DO        Dose:  50 mg   Take 1 capsule (50 mg) by mouth 3 times daily as needed for anxiety   Quantity:  90 capsule   Refills:  3       prazosin 2 MG capsule   Commonly known as:  MINIPRESS   This may have changed:    - medication strength  - how much to take   Used for:  Posttraumatic stress disorder   Changed by:  Brandi French DO        Dose:  2 mg   Take 1 capsule (2 mg) by mouth At Bedtime   Quantity:  30 capsule   Refills:  3       sertraline 100 MG tablet   Commonly known as:  ZOLOFT   This may have changed:    - medication strength  - how much to take  - how to take this  - when to take this  - additional instructions   Used for:  Moderate episode of recurrent major depressive disorder (H), Posttraumatic stress disorder   Changed by:  Brandi French DO        Dose:  100 mg   Take 1 tablet (100 mg) by mouth daily   Quantity:  30 tablet   Refills:  3            Where to get your medicines      These medications were sent to [id number] - [place], [place] - [address] AT [place] [address]  [address], [place] [address]     Phone:  [phone]     gabapentin 300 MG capsule    hydrochlorothiazide 25 MG tablet    hydrOXYzine 50 MG capsule    lisinopril 5 MG tablet    prazosin 2 MG capsule    sertraline 100 MG tablet                Primary Care Provider Office Phone # Fax #    Brandi French [phone]       [address] [place] [address]        Equal Access to Services     STEW JOSEPH : Hadii cinthya ku hadasho Soomaali, waaxda luqadaha, qaybta kaalmada adeegyada, waxay idiin hayaan adeeg nikaetiennegabriela mustafa . So [facility] [phone].    ATENCIÓN: Si margareth piedra, tiene a gilmore disposición servicios gratuitos de asistencia lingüística. [facility] [phone].    We comply with applicable federal civil rights laws and [place] laws. We do not discriminate on the basis of race, color, national origin, age, disability, sex, sexual orientation, or gender identity.            Thank you!     Thank you for choosing [facility]  for your care. Our goal is always to provide you with excellent care. Hearing back from our patients is one way we can continue to improve our services. Please take a few minutes to complete the written survey that you may receive in the mail after your visit with us. Thank you!             Your Updated Medication List - Protect others around you: Learn how to safely use, store and throw away your medicines at www.disposemymeds.org.          This list is accurate as of 9/7/18  5:24 PM.  Always use your most recent med list.                   Brand Name Dispense Instructions for use Diagnosis    gabapentin 300 MG capsule    NEURONTIN    120 capsule    Take 2 capsules (600 mg) by mouth 2 times daily as needed For nerve pain    Neuropathy       hydrochlorothiazide 25 MG tablet    HYDRODIURIL    30 tablet    Take 1 tablet (25 mg) by mouth daily    Benign essential hypertension       hydrOXYzine 50 MG capsule    VISTARIL    90 capsule    Take 1 capsule (50 mg) by  mouth 3 times daily as needed for anxiety    Posttraumatic stress disorder, Moderate episode of recurrent major depressive disorder (H), WANG (generalized anxiety disorder)       ibuprofen 200 MG capsule      Take 800 mg by mouth every 6 hours as needed        lisinopril 5 MG tablet    PRINIVIL/ZESTRIL    30 tablet    Take 1 tablet (5 mg) by mouth daily    Benign essential hypertension       prazosin 2 MG capsule    MINIPRESS    30 capsule    Take 1 capsule (2 mg) by mouth At Bedtime    Posttraumatic stress disorder       sertraline 100 MG tablet    ZOLOFT    30 tablet    Take 1 tablet (100 mg) by mouth daily    Moderate episode of recurrent major depressive disorder (H), Posttraumatic stress disorder

## 2018-09-07 NOTE — PROGRESS NOTES
"     SUBJECTIVE       Agustin Chaudhry is a 68 year old  male with a PMHx significant for:   Patient Active Problem List   Diagnosis     Hypertension     Chronic hepatitis C without hepatic coma (H)     Moderate episode of recurrent major depressive disorder (H)     Posttraumatic stress disorder     Here today to follow up and talk about difficulties with his medication refills.  He has had some difficulties with his pharmacy in regards to filling medications.  He is also wondering if there can be changes in the dosing of his current prescription so that he does not have to take as many tablets to get the same effect.  He tells me that he has gotten back to working part-time jobs and feels that this is helpful with respect to his mood.  He does note that he has seen a difference with the increase of Zoloft, but is wondering if we can continue to increase this dose.  He denies any passive suicidality today.  He notes that he is \"just still not where he wants to be.\"  He continues to wait for  to determine the final decision on his parents state, and this is very stressful for him.  He is worried that things will not fallen to place the way they should.  He continues to feel as though he has a very short fuse.  He notes that he has had issues with anger all his life, but recently feels as though everything is just getting to him more quickly.  He does feel as though the hydroxyzine is helpful, but wondering if there is anything else we can do as far as medications go to help with this.  Since starting prazosin he reports that he is having less nightmares.  He has no episodes of dizziness or lightheadedness if he gets up in the middle the night to go to the bathroom.  He does admit that his sleeping is maybe a little bit improved from where it has been in the past.  He still wakes up multiple times throughout the night.  He is feeling much less restless.    He did recently have 2 injections in his neck to help " with his chronic neuropathy in his upper extremities.  He reports that the injection on the right side has significantly improved his pain.  He is hopeful that the most recent one on his left side will have the same effect.  He feels that with control of this pain he is less angry and irritable.  He has been taking the gabapentin as well, which he feels might also be helping.    He denies any shortness of breath, hallucinations, abdominal pain, nausea, vomiting or diarrhea.  He does tell me that he occasionally gets twinges in the left side of his chest that last anywhere from a few seconds to a minute.  He has had these on and off for a few years.  They are unchanged, but he had a question about them.  His pain worsens with deep inspiration.  He reports that he usually rubs the left side of his chest and eventually goes away.  He denies any associated nausea, diaphoresis, radiation of his pain or chest pressure.    Patient speaks Enlgish and so an  was not used.    ROS: As stated in HPI.    PMH, Medications and Allergies were reviewed and updated as needed.        OBJECTIVE     Vitals:    09/07/18 1632 09/07/18 1633   BP: 154/70 169/68   Pulse: 75    Resp: 16    Temp: 98.2  F (36.8  C)    TempSrc: Oral    SpO2: 96%    Weight: 119 lb (54 kg)      Body mass index is 20.11 kg/(m^2).    Gen:  NAD, thin but well developed gentleman who is pleasant and cooperative   HEENT: mucous membranes moist. EOMI, sclera non-icteric, nares patent  Neck: supple without lymphadenopathy, trachea midline  CV:  RRR  - no murmurs, rubs, or gallups  Pulm:  CTAB, no wheezes/rales/rhonchi  Psych: Mood improved, but still depressed.  Affect is flat.  No SI/HI.  No hallucinations.  Thoughts are logical.  Dressed and groomed appropriately.    No results found for this or any previous visit (from the past 24 hour(s)).    ASSESSMENT AND PLAN     Agustin was seen today for recheck and recheck medication.    Diagnoses and all orders for  this visit:    Moderate episode of recurrent major depressive disorder (H)  -     sertraline (ZOLOFT) 100 MG tablet; Take 1 tablet (100 mg) by mouth daily  -     hydrOXYzine (VISTARIL) 50 MG capsule; Take 1 capsule (50 mg) by mouth 3 times daily as needed for anxiety    Posttraumatic stress disorder  -     sertraline (ZOLOFT) 100 MG tablet; Take 1 tablet (100 mg) by mouth daily  -     prazosin (MINIPRESS) 2 MG capsule; Take 1 capsule (2 mg) by mouth At Bedtime  -     hydrOXYzine (VISTARIL) 50 MG capsule; Take 1 capsule (50 mg) by mouth 3 times daily as needed for anxiety    WANG (generalized anxiety disorder)  -     hydrOXYzine (VISTARIL) 50 MG capsule; Take 1 capsule (50 mg) by mouth 3 times daily as needed for anxiety    Benign essential hypertension  -     hydrochlorothiazide (HYDRODIURIL) 25 MG tablet; Take 1 tablet (25 mg) by mouth daily  -     lisinopril (PRINIVIL/ZESTRIL) 5 MG tablet; Take 1 tablet (5 mg) by mouth daily    Neuropathy  -     gabapentin (NEURONTIN) 300 MG capsule; Take 2 capsules (600 mg) by mouth 2 times daily as needed For nerve pain    Went over in great detail all of his medications.  Increased dosing of sertraline.  Increased tablet dose of hydroxyzine did not have to take as many medications.  Increase prazosin to 2 mg at bedtime.  Refilled hydrochlorothiazide and started lisinopril as his blood pressure is significantly elevated today.  He reports taking his blood pressure at home and is high as well.  He notes that he is very diligent in taking his medications, and does not miss dosing.  We did discuss in detail that there is still room to increase his Zoloft if needed.  Gabapentin dosing was modified today to see if this continues to help with his neuropathy pains.  We did discuss at great length that with his depressive symptoms there is significant improvement from the first time that we have met.  Once his depression becomes more stable, we will focus more on his anxiety to see if we  cannot help with some of his short temper.  We will plan to have him come back in about 2-3 months for recheck, or sooner as needed.  I did discuss warning signs and symptoms for him to be seen more urgently for evaluation of his chest pain.  Will consider further workup in the near future in regards to this.    Discussed with MD Brandi Serrato, PGY-3

## 2019-01-29 DIAGNOSIS — F41.1 GAD (GENERALIZED ANXIETY DISORDER): ICD-10-CM

## 2019-01-29 DIAGNOSIS — G62.9 NEUROPATHY: ICD-10-CM

## 2019-01-29 DIAGNOSIS — F43.10 POSTTRAUMATIC STRESS DISORDER: ICD-10-CM

## 2019-01-29 DIAGNOSIS — F33.1 MODERATE EPISODE OF RECURRENT MAJOR DEPRESSIVE DISORDER (H): ICD-10-CM

## 2019-01-29 DIAGNOSIS — I10 BENIGN ESSENTIAL HYPERTENSION: ICD-10-CM

## 2019-01-29 RX ORDER — SERTRALINE HYDROCHLORIDE 100 MG/1
100 TABLET, FILM COATED ORAL DAILY
Qty: 30 TABLET | Refills: 3 | Status: SHIPPED | OUTPATIENT
Start: 2019-01-29 | End: 2019-05-13

## 2019-01-29 RX ORDER — GABAPENTIN 300 MG/1
600 CAPSULE ORAL 2 TIMES DAILY PRN
Qty: 120 CAPSULE | Refills: 3 | Status: SHIPPED | OUTPATIENT
Start: 2019-01-29 | End: 2019-07-08

## 2019-01-29 RX ORDER — HYDROCHLOROTHIAZIDE 25 MG/1
25 TABLET ORAL DAILY
Qty: 30 TABLET | Refills: 3 | Status: SHIPPED | OUTPATIENT
Start: 2019-01-29 | End: 2019-05-13

## 2019-01-29 RX ORDER — PRAZOSIN HYDROCHLORIDE 2 MG/1
2 CAPSULE ORAL AT BEDTIME
Qty: 30 CAPSULE | Refills: 3 | Status: SHIPPED | OUTPATIENT
Start: 2019-01-29 | End: 2019-06-03

## 2019-01-29 RX ORDER — LISINOPRIL 5 MG/1
5 TABLET ORAL DAILY
Qty: 30 TABLET | Refills: 3 | Status: SHIPPED | OUTPATIENT
Start: 2019-01-29 | End: 2019-07-08

## 2019-01-29 RX ORDER — HYDROXYZINE PAMOATE 50 MG/1
50 CAPSULE ORAL 3 TIMES DAILY PRN
Qty: 90 CAPSULE | Refills: 3 | Status: SHIPPED | OUTPATIENT
Start: 2019-01-29 | End: 2019-07-08

## 2019-01-29 NOTE — TELEPHONE ENCOUNTER
Pt stated that he has been to the pharmacy several times and has tried to refill all his medications but has not heard anything from us. Pt stated that he's been off of his medications since Friday and he states that he is very upset. Pt would like his medications refilled and sent to [facility] on [address]. Thanks. ANGELICA Wallis

## 2019-01-30 NOTE — TELEPHONE ENCOUNTER
Patient was called and notified that his medications are approved and ready for him to  at his preferred pharmacy.  ANGELICA Macias

## 2019-05-13 ENCOUNTER — OFFICE VISIT (OUTPATIENT)
Dept: FAMILY MEDICINE | Facility: CLINIC | Age: 69
End: 2019-05-13
Payer: COMMERCIAL

## 2019-05-13 VITALS
WEIGHT: 136.2 LBS | SYSTOLIC BLOOD PRESSURE: 118 MMHG | DIASTOLIC BLOOD PRESSURE: 68 MMHG | RESPIRATION RATE: 20 BRPM | TEMPERATURE: 97.9 F | BODY MASS INDEX: 22.69 KG/M2 | HEIGHT: 65 IN | OXYGEN SATURATION: 99 % | HEART RATE: 69 BPM

## 2019-05-13 DIAGNOSIS — M53.3 SACROILIAC JOINT PAIN: ICD-10-CM

## 2019-05-13 DIAGNOSIS — M54.41 CHRONIC BILATERAL LOW BACK PAIN WITH RIGHT-SIDED SCIATICA: Primary | ICD-10-CM

## 2019-05-13 DIAGNOSIS — G89.29 CHRONIC BILATERAL LOW BACK PAIN WITH RIGHT-SIDED SCIATICA: Primary | ICD-10-CM

## 2019-05-13 DIAGNOSIS — F33.1 MODERATE EPISODE OF RECURRENT MAJOR DEPRESSIVE DISORDER (H): ICD-10-CM

## 2019-05-13 DIAGNOSIS — I10 BENIGN ESSENTIAL HYPERTENSION: ICD-10-CM

## 2019-05-13 DIAGNOSIS — Z23 NEED FOR VACCINATION: ICD-10-CM

## 2019-05-13 DIAGNOSIS — F43.10 POSTTRAUMATIC STRESS DISORDER: ICD-10-CM

## 2019-05-13 PROBLEM — Z96.1 PSEUDOPHAKIA, RIGHT EYE: Status: ACTIVE | Noted: 2017-01-26

## 2019-05-13 PROBLEM — M54.2 CHRONIC NECK PAIN: Status: ACTIVE | Noted: 2018-10-22

## 2019-05-13 PROBLEM — H16.042 MARGINAL CORNEAL ULCER OF LEFT EYE: Status: ACTIVE | Noted: 2018-03-01

## 2019-05-13 PROBLEM — M50.30 DEGENERATION OF CERVICAL INTERVERTEBRAL DISC: Status: ACTIVE | Noted: 2018-08-16

## 2019-05-13 RX ORDER — SERTRALINE HYDROCHLORIDE 100 MG/1
150 TABLET, FILM COATED ORAL DAILY
Qty: 45 TABLET | Refills: 3 | Status: SHIPPED | OUTPATIENT
Start: 2019-05-13 | End: 2019-06-03

## 2019-05-13 ASSESSMENT — MIFFLIN-ST. JEOR: SCORE: 1307.8

## 2019-05-13 NOTE — PROGRESS NOTES
SUBJECTIVE       Agustin Chaudhry is a 69 year old  male with a PMHx significant for:   Patient Active Problem List   Diagnosis     Hypertension     Hepatitis C virus infection without hepatic coma     Moderate episode of recurrent major depressive disorder (H)     Posttraumatic stress disorder     Chronic neck pain     Crohn's disease in remission (H)     Degeneration of cervical intervertebral disc     Marginal corneal ulcer of left eye     Recurrent erosion of both corneas     Pseudophakia, right eye     Agustin is here today for follow-up.    He reports that he has had chronic constant right side low back pain and SI joint pain that has been more bothersome over the last 2-3 months.  He says most of the time he rates this as a 10/10, but currently it is only 8/10.  He reports it is worse when he is sitting, and it gets worse with prolonged sitting.  It does still hurt when he walks as well as stands.  He does not seem to know of anything that seems to completely alleviate this pain.  He does take a significant of ibuprofen that only takes the edge off.  He denies any weakness in his lower extremity.  No numbness or tingling in his right lower leg.  No known of any back problems.  He is wondering if he can get an injection to see if this will help make things better.  He does report that he does flexibility exercises daily to help with his hamstrings.  He has [place], which likely has exacerbated his symptoms.  No bowel or bladder problems.  No fevers or chills.    Is also here today to talk about events down to time she is back up dizzy, flushed, diaphoretic feels as though he is in a pass out.  He notices this if he stands up too quickly, or goes from laying to sitting/standing too quickly.  He has never lost consciousness.  He has never fallen.  He has not changed any of his medications taking them as prescribed.  He feels as though he is drinking plenty of fluid.  When he is  "working he drinks lots of propel, water and Gatorade.  No chest pain, shortness of breath, palpitations or headaches.  Is never had any of this in the past.    He had his teeth pulled this past fall and is looking to get his dentures next week.  He is very excited about this.  He has court again next week to determine a trial date for the inheritance of his parents who passed away in 2017.  This is still very stressful situation for him with his siblings.  He is taking his Zoloft every day, but is wondering if there is any increase in dose that we can do in being proactive with the upcoming stressors that he is about to have.  He does feel as though he is in a better place at this point in time.  He feels that his mood is good.  He is just ready to close this chapter in his life.    Patient speaks English and so an  was not used.    ROS: As stated in HPI.    PMH, Medications and Allergies were reviewed and updated as needed.        OBJECTIVE     Vitals:    05/13/19 1433   BP: 118/68   Pulse: 69   Resp: 20   Temp: 97.9  F (36.6  C)   TempSrc: Oral   SpO2: 99%   Weight: 61.8 kg (136 lb 3.2 oz)   Height: 1.64 m (5' 4.57\")     Body mass index is 22.97 kg/m .    Gen:  NAD, well-developed but thin, pleasant and cooperative  HEENT: mucous membranes moist. EOMI, sclera non-icteric, nares patent  CV:  RRR  - no murmurs, rubs, or gallups  Pulm:  CTAB, no wheezes/rales/rhonchi  GI: soft, nontender, no masses, no rebound, BS intact throughout  MSK: Patient's gait is normal and without limp.  Full range of motion with trunk flexion and extension.  No obvious skin changes.  Tenderness to palpation over the right SI joint and into the gluteal muscles.  No midline tenderness.  Strength 5/5 in lower extremities with hip flexion, knee flexion and knee extension.  Sensation intact and symmetric bilaterally.  Slump test does reproduce pain to his knee on the right.  Negative logroll.  No pain at the hip joint.  Psych: Mood " and affect appropriate, much improved from my last visit with him.  Less tearful.    No results found for this or any previous visit (from the past 24 hour(s)).    ASSESSMENT AND PLAN     Agustin was seen today for recheck medication, dizziness and back pain.    Diagnoses and all orders for this visit:    Need for vaccination  -     ADMIN VACCINE, INITIAL  -     ADMIN VACCINE, EACH ADDITIONAL  -     HEPATITIS B VACCINE,ADULT,IM  -     HEPATITIS A VACCINE ADULT IM  -     TDAP VACCINE (BOOSTRIX)  -     Pneumococcal vaccine 13 valent PCV13 IM (Prevnar) [[id number]]    Moderate episode of recurrent major depressive disorder (H)  Posttraumatic stress disorder  -     sertraline (ZOLOFT) 100 MG tablet; Take 1.5 tablets (150 mg) by mouth daily  Will increase dose to 150 mg daily.  He does seem to be doing well on the 100 mg.  Discussed that if he is not feeling any difference in his mood, or has an exacerbation of something we could always consider increasing to 200 mg daily which would be the max for this medication.  He will call if he has any issues.    Sacroiliac joint pain  Chronic bilateral low back pain with right-sided sciatica  -     ORTHOPEDICS ADULT REFERRAL; Future  I did offer physical therapy for the patient, but he was not open to this at this time.  He did offer imaging, but he would like to see an orthopedics provider and have one being worked up with them.  I discussed with him that there are options for injections, but he would need imaging to better see what exactly is causing the pain.  I discussed that this could be SI joint inflammation and irritation, possibly arthritis.  I also discussed that this could be coming from his low back.  On exam it does not appear to be related to his hip joint, but he likely has age-related arthritis in that joint as well.  Does not seem to be symptomatic or exacerbating the pain that he is talking to me about.    Hypertension  Patient presenting with what sounds like  orthostatic hypotension given history.  He  was on 2 medications for blood pressure, so HCTZ was discontinued today.  We will keep him on lisinopril for now.  He is also on prazosin for his PTSD and nightmares which has been very helpful for him.  He was actually requesting an increase in this medication.  Given his symptoms of dizziness and soft blood pressures will hold on increasing this for now.  We will have him check his blood pressure occasionally over the next 1-2 weeks to see if he is having any elevated blood pressures.  My goal for him would be systolics less than 140.  If he is having increased blood pressure over this goal I advised him to call in and leave the message.  At that time could consider increasing lisinopril, or even increasing prazosin to 3 mg at night as this would also be a second line agent for his blood pressure.    RTC in 3 months for follow up, or sooner if develops new or worsening symptoms.    Discussed with MD Brandi Jack, PGY-3

## 2019-05-13 NOTE — PATIENT INSTRUCTIONS
Referral for [facility] was placed.   Dr. Zhu, Oli, Tito or Madeline are all great doctors to see.      STOP hydrochlorothiazide 25 mg  Keep taking Lisinopril 5 mg    Zoloft increased to 150 mg daily.  We do still have room to increase this, if in about 3-4 weeks you note no changes.    Check blood pressures for the next 1-2 weeks, and if elevated (SBP or top number >140) call me and let me know.  We can adjust your medications over the phone.    [facility] referral placed online, they will call patient to schedule.  Katia Daniel, [facility]  5/14/19

## 2019-06-03 ENCOUNTER — OFFICE VISIT (OUTPATIENT)
Dept: FAMILY MEDICINE | Facility: CLINIC | Age: 69
End: 2019-06-03
Payer: COMMERCIAL

## 2019-06-03 ENCOUNTER — TELEPHONE (OUTPATIENT)
Dept: FAMILY MEDICINE | Facility: CLINIC | Age: 69
End: 2019-06-03

## 2019-06-03 VITALS
DIASTOLIC BLOOD PRESSURE: 80 MMHG | HEIGHT: 65 IN | SYSTOLIC BLOOD PRESSURE: 171 MMHG | HEART RATE: 66 BPM | BODY MASS INDEX: 22.53 KG/M2 | WEIGHT: 135.2 LBS | OXYGEN SATURATION: 99 % | RESPIRATION RATE: 20 BRPM | TEMPERATURE: 97.8 F

## 2019-06-03 DIAGNOSIS — I10 BENIGN ESSENTIAL HYPERTENSION: ICD-10-CM

## 2019-06-03 DIAGNOSIS — F43.10 POSTTRAUMATIC STRESS DISORDER: ICD-10-CM

## 2019-06-03 DIAGNOSIS — Z00.00 MEDICARE ANNUAL WELLNESS VISIT, INITIAL: Primary | ICD-10-CM

## 2019-06-03 DIAGNOSIS — F33.1 MODERATE EPISODE OF RECURRENT MAJOR DEPRESSIVE DISORDER (H): ICD-10-CM

## 2019-06-03 RX ORDER — PRAZOSIN HYDROCHLORIDE 1 MG/1
3 CAPSULE ORAL AT BEDTIME
Qty: 180 CAPSULE | Refills: 3 | Status: SHIPPED | OUTPATIENT
Start: 2019-06-03 | End: 2020-02-05

## 2019-06-03 RX ORDER — SERTRALINE HYDROCHLORIDE 100 MG/1
200 TABLET, FILM COATED ORAL DAILY
Qty: 90 TABLET | Refills: 3 | Status: SHIPPED | OUTPATIENT
Start: 2019-06-03 | End: 2020-01-10

## 2019-06-03 ASSESSMENT — PATIENT HEALTH QUESTIONNAIRE - PHQ9: SUM OF ALL RESPONSES TO PHQ QUESTIONS 1-9: 4

## 2019-06-03 ASSESSMENT — MIFFLIN-ST. JEOR: SCORE: 1303.31

## 2019-06-03 NOTE — PROGRESS NOTES
Preceptor Attestation:   Patient seen, evaluated and discussed with the resident. I have verified the content of the note, which accurately reflects my assessment of the patient and the plan of care.   Supervising Physician:  Aldo Sarmiento MD

## 2019-06-03 NOTE — TELEPHONE ENCOUNTER
[facility] phone call message- general phone call:    Reason for call:     Pt is calling to check to see if Dr. French has called and heard back from Dr. Zhu's office. He wants a call back    Return call needed: Yes    OK to leave a message on voice mail? Yes    Primary language: English      needed? No    Call taken on Sunita 3, 2019 at 4:49 PM by Ann-Marie Astudillo

## 2019-06-03 NOTE — NURSING NOTE
Medicare Wellness Visit  Health Risk Assessment        Visual Acuity:  Right Eye: 10/12.5   Left Eye: 10/16  Both Eyes: 10/12.5        No flowsheet data found.         Health Risk Assessment / Review of Systems     Constitutional: Any fevers or night sweats? No     Eyes:  Vision problems   No     Hearing Do you feel you have hearing loss?  No     Cardiovascular: Any chest pain, fast or irregular heart beat, calf pain with walking?      YES pain in left calf x4-5 days          Respiratory:   Any breathing problems or cough?   No     Gastrointestinal: Any stomach or stool problems?   No      Genitourinary: Do you have difficulty controlling urination?   No     Muscles and Joints: Any joint stiffness or soreness?    YES Right lower back    Skin: Any concerning lesions or moles?   No     Nervous System: Any loss of strength or feeling, numbness or tingling, shaking, dizziness, or headache?  No     Mental Health: Any depression, anxiety or problems sleeping?    No     Cognition: Do you have any problems with your memory?  No     PHQ-2 Score:   PHQ-2 ( 1999 Pfizer) 9/7/2018   Q1: Little interest or pleasure in doing things 0   Q2: Feeling down, depressed or hopeless 0   PHQ-2 Score 0       PHQ-9 Score:   [facility] Follow-up to PHQ 7/27/2018   PHQ-9 9. Suicide Ideation past 2 weeks Not at all            Medical Care     What other specialists or organizations are involved in your medical care?  Neurology and plastic hand surgery doctor  Patient Care Team       Relationship Specialty Notifications Start End    Brandi French DO PCP - General Family Practice  7/26/18     Phone: [phone] Fax: [phone]         [address] [place] [id number]                 Social History / Home Safety     Social History     Tobacco Use     Smoking status: Former Smoker     Smokeless tobacco: Never Used   Substance Use Topics     Alcohol use: No     Marital Status:  Who lives in your household? self    Does your home have any of  "the following safety concerns? Loose rugs in the hallway, no grab bars in the bathroom, no handrails on the stairs or have poorly lit areas?  No     Do you feel threatened or controlled by a partner, ex-partner or anyone in your life? No     Has anyone hurt you physically, for example by pushing, hitting, slapping or kicking you   or forcing you to have sex? No          Functional Status     Do you need help with dressing yourself, bathing, or walking?No     Do you need help with the phone, transportation, shopping, preparing meals, housework, laundry, medications or managing money?No       Risk Behaviors and Healthy Habits     History   Smoking Status     Former Smoker   Smokeless Tobacco     Never Used     How many servings of fruits and vegetables do you eat a day? 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 No     Do you use any other drugs?  YES cannabis        Do you use alcohol?No      Frailty Assessment            1. By yourself and note using aids, do you have difficulty walking up 10 steps without resting?  No  (1 for Yes, 0 for No)    2. By yourself and not using mobility aids, do you have any difficulty walking several hundred yards? No  (1 for Yes, 0 for No)    3. Have you lost 10 or more pounds unintentionally in the previous year?  YES  (If \"Yes\" and >5% weight loss, then score 1.  Score 0, if <5% weight loss or \"No\" weight loss)    4. How much of the times during the past 3 weeks did you feel tired? 3. Some of the time (\"1\" or \"2\" are scored 1, others 0)    5.  A doctor told the patient they had the following illnesses:  High blood pressure (0-4 = score 0, 5-11= score 1)        Patricia Caraballo CMA    "

## 2019-06-03 NOTE — PATIENT INSTRUCTIONS
Increase Prazosin to 3 mg at bedtime.  If your blood pressure is still high at next visit we will increase Lisinopril.  New prescriptions for Zoloft and Prazosin sent to pharmacy.    If you want to try to get off Gabapentin you could try a slow taper off -- 1 tablet in AM, 2 tablets in PM for 1 week, then 1 tablet in AM and PM for 1 week then 1 tablet in PM for 1 week then stop.  If your symptoms get worse at any point in this you can go back to your normal dose, or stay at the last dose you are taking that did not worsen your symptoms.    I will call Dr. Zhu's office today and let you know what I hear back from them.    Return in about 3-4 months for blood pressure recheck.    MEDICARE PERSONAL PREVENTIVE SERVICES PLAN - IMMUNIZATIONS     Here are your recommended immunizations.  Take this home for your reference.                                                    IMMUNIZATIONS Description Recommend today?     Influenza (Flu shot) Prevents flu; should get every year No; not in season.    PCV 13 Pneumonia vaccination; you get it once No; is up to date.   PPSV 23 Second pneumonia vaccination; usually get it 1 year after PCV 13 No: is not indicated today.   Zoster (Shingles) Prevents shingles; you get it once  (Check with Part D insurance for coverage, must receive at a pharmacy, not clinic) Yes, advised pt to get it at a pharmacy because we do not offer this vaccine here.   Tetanus Prevents tetanus; once every 10 years No; is up to date.       MEDICARE PERSONAL PREVENTIVE SERVICES PLAN - SERVICES     Review these tests with your doctor then decide which ones you want and take this page home for your reference                                                    IMMUNIZATIONS Description Recommend today?     Hepatitis B  If you have any of the following risk factors you should be immunized for hepatitis B: severe kidney disease, people who live in the same house as a carrier of Hepatitis B virus, people who live in   institutions (e.g. nursing homes or group homes), homosexual men, patients with hemophilia who received Factor VIII or IX concentrates, abusers of illicit injectable drugs No; is up to date.     SCREENING TESTS     Description   Year of Last Screening   Recommended Today?   Heart disease screening blood tests    Cholesterol level Reducing cholesterol can reduce your risk of heart attacks by 25%.  Screening is recommended yearly if you are at risk of heart disease otherwise every 4-5 years 7/2018 No; is up to date.   Diabetes screening tests    Hemoglobin A1c blood test   Finding and treating diabetes early can reduce complications.  Screening recommended/covered yearly if you have high blood pressure, high cholesterol, obesity (BMI >30), or a history of high blood glucose tests; or 2 of the following: family history of diabetes, overweight (BMI >25 but <30), age 65 years or older, and a history of diabetes of pregnancy or gave birth to baby weighing more than 9 lbs. None Will screen at different visit   Hepatitis B screening Finding hepatitis B early can reduce complications.  Screening is recommended for persons with selected risk factors. 2015 No: is not indicated today.   Hepatitis C screening Finding hepatitis C early can reduce complications.  Screening is recommended for all persons born from 1945 through 1965 and for those with selected other risk factors.  2015 Treated in 2015   HIV screening Finding HIV early can reduce complications.  Screening is recommended for persons with risk factors for HIV infection. 2015 No: is not indicated today.   Glaucoma screening Early detection of glaucoma can prevent blindness.   Please talk to your eye doctor about this.       SCREENING TESTS     Description   Year of Last Screening   Recommended Today?   Colorectal cancer screening    Fecal occult blood test     Screening colonoscopy Screening for colon cancer has been shown to reduce death from colon cancer by 25-30%.  Screening recommended to start at 50 years and continuing until age 75 years.   Will obtain records Will await records   Screening for Lung Cancer     Low-dose CT scanning Screening can reduce mortality in persons aged 55-80 who have smoked at least 30 pack-years and who are either still smoking or have quit in the past 15 years. None Recommeded and patient declined.    Abdominal Aortic Aneurysm (AAA) screening    Ultrasound (US)   An aneurysm treated before rupture is very safe -a ruptured aneurysm can be fatal.  Screening  by US for AAA is limited to patients who meet one of the following criteria:    Men who are 65-75 years old and have smoked more than 100 cigarettes in their lifetime    Anyone with a family history of abdominal aortic aneurysm None Recommeded and patient declined.      MEDICARE WELLNESS EXAM PATIENT INSTRUCTIONS    Yearly exam:     See your health care provider every year in order to review changes in your health, review medicines that you take, and discuss preventive care needs such as immunizations and cancer screening.    Get a flu shot each year.     Advance Directives:    If you have not done so, you are encouraged to complete advance directives and/or a living will.   More information about advance directives can be found at: http://[email or web address].org/advocacy/Key-Issues/Advance-Directives    Nutrition:     Eat at least 5 servings of fruits and vegetables each day.     Eat whole-grain bread, whole-wheat pasta and brown rice instead of white grains and rice.     Talk to your doctor about Calcium and Vitamin D.     Lifestyle:    Exercise for at least 150 minutes a week (30 minutes a day, 5 days a week). This will help you control your weight and prevent disease.     Limit alcohol to one drink per day.     If you smoke, try to quit - your doctor will be happy to help.     Wear sunscreen to prevent skin cancer.     See your dentist every six months for an exam and cleaning.     See your eye doctor  every 1 to 2 years to screen for conditions such as glaucoma, macular degeneration and cataracts.

## 2019-06-03 NOTE — PROGRESS NOTES
65+ Annual Wellness Visit         HPI     This 69 year old male presents as an established patient.  PCP: Brandi French who presents for a Initial Medicare Wellness Visit    Other issues patient wants to be addressed today:    Chief Complaint   Patient presents with     Wellness Visit     65+ wellness     Patient Active Problem List   Diagnosis     Hypertension     Hepatitis C virus infection without hepatic coma     Moderate episode of recurrent major depressive disorder (H)     Posttraumatic stress disorder     Chronic neck pain     Crohn's disease in remission (H)     Degeneration of cervical intervertebral disc     Marginal corneal ulcer of left eye     Recurrent erosion of both corneas     Pseudophakia, right eye     Past Medical History:   Diagnosis Date     Depression      Hypertension      Infantile spasms with broad thumbs (H)      PTSD (post-traumatic stress disorder)      Family History   Problem Relation Age of Onset     Cancer No family hx of      Coronary Artery Disease No family hx of      Diabetes No family hx of        Problem List, Family History and past Medical History reviewed and unchanged/updated.  Nursing Notes:   Patricia Caraballo CMA  6/3/2019  8:26 AM  Signed  Medicare Wellness Visit  Health Risk Assessment        Visual Acuity:  Right Eye: 10/12.5   Left Eye: 10/16  Both Eyes: 10/12.5        No flowsheet data found.         Health Risk Assessment / Review of Systems     Constitutional: Any fevers or night sweats? No     Eyes:  Vision problems   No     Hearing Do you feel you have hearing loss?  No     Cardiovascular: Any chest pain, fast or irregular heart beat, calf pain with walking?      YES pain in left calf x4-5 days          Respiratory:   Any breathing problems or cough?   No     Gastrointestinal: Any stomach or stool problems?   No      Genitourinary: Do you have difficulty controlling urination?   No     Muscles and Joints: Any joint stiffness or soreness?    YES Right lower  back    Skin: Any concerning lesions or moles?   No     Nervous System: Any loss of strength or feeling, numbness or tingling, shaking, dizziness, or headache?  No     Mental Health: Any depression, anxiety or problems sleeping?    No     Cognition: Do you have any problems with your memory?  No     PHQ-2 Score:   PHQ-2 ( 1999 Pfizer) 9/7/2018   Q1: Little interest or pleasure in doing things 0   Q2: Feeling down, depressed or hopeless 0   PHQ-2 Score 0       PHQ-9 Score:   [facility] Follow-up to PHQ 7/27/2018   PHQ-9 9. Suicide Ideation past 2 weeks Not at all            Medical Care     What other specialists or organizations are involved in your medical care?  Neurology and plastic hand surgery doctor  Patient Care Team       Relationship Specialty Notifications Start Brandi Mancia DO PCP - General Family Practice  7/26/18     Phone: [phone] Fax: [phone]         [address] [place] [address]                 Social History / Home Safety     Social History     Tobacco Use     Smoking status: Former Smoker     Smokeless tobacco: Never Used   Substance Use Topics     Alcohol use: No     Marital Status:  Who lives in your household? self    Does your home have any of the following safety concerns? Loose rugs in the hallway, no grab bars in the bathroom, no handrails on the stairs or have poorly lit areas?  No     Do you feel threatened or controlled by a partner, ex-partner or anyone in your life? No     Has anyone hurt you physically, for example by pushing, hitting, slapping or kicking you   or forcing you to have sex? No          Functional Status     Do you need help with dressing yourself, bathing, or walking?No     Do you need help with the phone, transportation, shopping, preparing meals, housework, laundry, medications or managing money?No       Risk Behaviors and Healthy Habits     History   Smoking Status     Former Smoker   Smokeless Tobacco     Never Used     How many servings of  "fruits and vegetables do you eat a day? 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 No     Do you use any other drugs?  YES cannabis        Do you use alcohol?No      Frailty Assessment            1. By yourself and note using aids, do you have difficulty walking up 10 steps without resting?  No  (1 for Yes, 0 for No)    2. By yourself and not using mobility aids, do you have any difficulty walking several hundred yards? No  (1 for Yes, 0 for No)    3. Have you lost 10 or more pounds unintentionally in the previous year?  YES  (If \"Yes\" and >5% weight loss, then score 1.  Score 0, if <5% weight loss or \"No\" weight loss)    4. How much of the times during the past 3 weeks did you feel tired? 3. Some of the time (\"1\" or \"2\" are scored 1, others 0)    5.  A doctor told the patient they had the following illnesses:  High blood pressure (0-4 = score 0, 5-11= score 1)        Patricia Caraballo CMA      Frailty Assessment            1. 0    2. 0    3. 0    4. 0    5. Count number of illnesses. 0    Total score: 0    Frailty screen score (3-5 = frail; consider interdisciplinary assessment and care.  1-2 = prefrail; at risk for adverse health events; 0 = robust)    EVALUATION OF COGNITIVE FUNCTION     Mood/affect:Normal  Appearance:Normal  Family member/caregiver input: None    Mini Cog Scoring   3 points   Clock Draw Test result:  Normal   Cognitive screen is:Negative    Other Assessments     CV Risk based on Pooled Cohort Risk (consider assessing every 4-6 years; consider statin in patients with 10-yr risk > 7.5%):  The 10-year ASCVD risk score (Reynaldoyoana SWEET Jr., et al., 2013) is: 27.6%    Values used to calculate the score:      Age: 69 years      Sex: Male      Is Non- : No      Diabetic: No      Tobacco smoker: No      Systolic Blood Pressure: 171 mmHg      Is BP treated: Yes      HDL Cholesterol: 54.6 mg/dL      Total Cholesterol: " "199 mg/dL    Advance Directives: Discussed with patient and family as appropriate.  Has patient completed advance directives and/or a living will?  no     Immunization History   Administered Date(s) Administered     HepA-Adult 12/11/2013, 05/13/2019     HepB-Adult 12/11/2013, 05/13/2019     Influenza (High Dose) 3 valent vaccine 09/11/2017     Influenza Vaccine, 3 YRS +, IM (QUADRIVALENT W/PRESERVATIVES) 09/30/2016     Pneumo Conj 13-V (2010&after) 05/13/2019     TDAP Vaccine (Boostrix) 05/13/2019     Reviewed Immunization Record Today         Physical Exam     Vitals: /80   Pulse 66   Temp 97.8  F (36.6  C) (Oral)   Resp 20   Ht 1.64 m (5' 4.57\")   Wt 61.3 kg (135 lb 3.2 oz)   SpO2 99%   BMI 22.80 kg/m    BMI= Body mass index is 22.8 kg/m .  EXAM:  Constitutional: healthy, alert and no distress    Cardiovascular: negative, PMI normal. No lifts, heaves, or thrills. RRR. No murmurs, clicks gallops or rub  Respiratory: negative, Percussion normal. Good diaphragmatic excursion. Lungs clear  Head: Normocephalic. No masses, lesions, tenderness or abnormalities  Neck: Neck supple. No adenopathy. Thyroid symmetric, normal size,, Carotids without bruits.  ENT: ENT exam normal, no neck nodes or sinus tenderness  Abdomen: Abdomen soft, non-tender. BS normal. No masses, organomegaly        Assessment and Plan     Reviewed Preventive Services and Plan form with patient as specified in Patient Instructions.    Positive findings on assessment:   Agustin was seen today for wellness visit.    Diagnoses and all orders for this visit:    Moderate episode of recurrent major depressive disorder (H)  -     sertraline (ZOLOFT) 100 MG tablet; Take 2 tablets (200 mg) by mouth daily    Posttraumatic stress disorder  -     sertraline (ZOLOFT) 100 MG tablet; Take 2 tablets (200 mg) by mouth daily  -     prazosin (MINIPRESS) 1 MG capsule; Take 3 capsules (3 mg) by mouth At Bedtime    Benign essential hypertension    Increase prazosin " to 3 mg at bedtime.  This could help slightly with blood pressure.  Patient wanted to try this before increasing lisinopril.  If blood pressure continues to remain elevated, would recommend increasing lisinopril follow-up visit.    Options for treatment and follow-up care were reviewed with the Agustin Chaudhry and/or guardian engaged in the decision making process and verbalized understanding of the options discussed and agreed with the final plan.    Brandi French, DO

## 2019-06-04 ENCOUNTER — TELEPHONE (OUTPATIENT)
Dept: FAMILY MEDICINE | Facility: CLINIC | Age: 69
End: 2019-06-04

## 2019-06-04 NOTE — TELEPHONE ENCOUNTER
[facility] Family Medicine phone call message- general phone call:    Reason for call: the pt called to let the Dr know he still has not been moe to get an appointment for his Cortizone shot and is very upset and would like to see if the Dr can help him he feels that the specialist's office is giving him the runaround       Return call needed: Yes    OK to leave a message on voice mail? Yes    Primary language: English      needed? No    Call taken on June 4, 2019 at 4:52 PM by Seymour Stevenson

## 2019-06-04 NOTE — TELEPHONE ENCOUNTER
Called and spoke with patient letting him know I have left 2 messages with Dr. Zhu's team about getting back in touch with him.  Gave him the number and instructions on how to be in touch with them as well.  He had no further questions. -JW

## 2019-06-06 NOTE — TELEPHONE ENCOUNTER
"Left message on voicemail:  I was able to get in touch with Dr. Zhu's team.  Agustin's insurance requires 4 weeks of \"conservative\" treatment before being about to pursue an injection that would be covered by insurance.  I know he will not be happy with this, and discussed that unfortunately our hands are tied with how to get around this.  The injection would be thousands of dollars without jumping through this hoop.  I offered placing PT orders for him, but it might be best to go through Dr. Zhu's team so they have the records of completion if he does do this to pursue injections there after.      Routing as FYI.  Thanks for your help.  JW  "

## 2019-06-06 NOTE — TELEPHONE ENCOUNTER
[facility] and ask for triage RN    Will Route to Dr. French .    SAMANTHA Arita, RN

## 2019-07-08 DIAGNOSIS — G62.9 NEUROPATHY: ICD-10-CM

## 2019-07-08 DIAGNOSIS — F33.1 MODERATE EPISODE OF RECURRENT MAJOR DEPRESSIVE DISORDER (H): ICD-10-CM

## 2019-07-08 DIAGNOSIS — F41.1 GAD (GENERALIZED ANXIETY DISORDER): ICD-10-CM

## 2019-07-08 DIAGNOSIS — I10 BENIGN ESSENTIAL HYPERTENSION: ICD-10-CM

## 2019-07-08 DIAGNOSIS — F43.10 POSTTRAUMATIC STRESS DISORDER: ICD-10-CM

## 2019-07-08 RX ORDER — GABAPENTIN 300 MG/1
600 CAPSULE ORAL 2 TIMES DAILY PRN
Qty: 120 CAPSULE | Refills: 3 | Status: SHIPPED | OUTPATIENT
Start: 2019-07-08 | End: 2019-12-01

## 2019-07-08 RX ORDER — LISINOPRIL 5 MG/1
5 TABLET ORAL DAILY
Qty: 30 TABLET | Refills: 3 | Status: SHIPPED | OUTPATIENT
Start: 2019-07-08 | End: 2019-12-01

## 2019-07-08 RX ORDER — HYDROXYZINE PAMOATE 50 MG/1
50 CAPSULE ORAL 3 TIMES DAILY PRN
Qty: 90 CAPSULE | Refills: 3 | Status: SHIPPED | OUTPATIENT
Start: 2019-07-08 | End: 2020-04-13

## 2019-07-16 DIAGNOSIS — F41.1 GAD (GENERALIZED ANXIETY DISORDER): ICD-10-CM

## 2019-07-16 DIAGNOSIS — F33.1 MODERATE EPISODE OF RECURRENT MAJOR DEPRESSIVE DISORDER (H): ICD-10-CM

## 2019-07-16 DIAGNOSIS — F43.10 POSTTRAUMATIC STRESS DISORDER: ICD-10-CM

## 2019-07-16 RX ORDER — HYDROXYZINE PAMOATE 50 MG/1
50 CAPSULE ORAL 3 TIMES DAILY PRN
Qty: 90 CAPSULE | Refills: 3 | Status: CANCELLED | OUTPATIENT
Start: 2019-07-16

## 2019-07-16 NOTE — TELEPHONE ENCOUNTER
"Pharmacy note, \"Product backordered/unavailable:  Unavailable in 25mg & 50mg.  Please consider changing to another alternative.  Hydroxyzine HCL (ATARAX) tablets remain available.  Thank you.\"    "

## 2019-07-17 RX ORDER — HYDROXYZINE HYDROCHLORIDE 25 MG/1
25-50 TABLET, FILM COATED ORAL 3 TIMES DAILY PRN
Qty: 90 TABLET | Refills: 1 | Status: SHIPPED | OUTPATIENT
Start: 2019-07-17 | End: 2019-10-24

## 2019-10-22 DIAGNOSIS — F41.1 GAD (GENERALIZED ANXIETY DISORDER): ICD-10-CM

## 2019-10-22 DIAGNOSIS — F43.10 POSTTRAUMATIC STRESS DISORDER: ICD-10-CM

## 2019-10-24 RX ORDER — HYDROXYZINE HYDROCHLORIDE 25 MG/1
25-50 TABLET, FILM COATED ORAL 3 TIMES DAILY PRN
Qty: 90 TABLET | Refills: 1 | Status: SHIPPED | OUTPATIENT
Start: 2019-10-24 | End: 2020-04-13

## 2019-11-07 ENCOUNTER — TELEPHONE (OUTPATIENT)
Dept: FAMILY MEDICINE | Facility: CLINIC | Age: 69
End: 2019-11-07

## 2019-11-08 NOTE — TELEPHONE ENCOUNTER
Prior Authorization Retail Medication Request    Medication/Dose: HYDROXYZINE HCL 25 MG TABLET   ICD code (if different than what is on RX):  Posttraumatic stress disorder [F43.10]       WANG (generalized anxiety disorder) [F41.1]           Previously Tried and Failed:   ationale:      Insurance Name:  [facility] MEDICARE ADVANTAGE [[id number]]  Insurance ID:  [id number]      Pharmacy Information (if different than what is on RX)  Name:  [id number] - [place], [place] - [address]  Phone:  [phone]

## 2019-11-11 NOTE — TELEPHONE ENCOUNTER
Central Prior Authorization Team   [phone]    PA Initiation    Medication: HYDROXYZINE HCL 25 MG TABLET   Insurance Company: [employer] [place] - Phone [phone] Fax [phone]  Pharmacy Filling the Rx: [id number] - [place], [place] - [address] [place] [address] [address]  Filling Pharmacy Phone: [phone]  Filling Pharmacy Fax: [phone]  Start Date: 11/11/2019

## 2019-11-12 NOTE — TELEPHONE ENCOUNTER
Prior Authorization Approval    Authorization Effective Date: 8/13/2019  Authorization Expiration Date: 11/11/2020  Medication: HYDROXYZINE HCL 25 MG TABLET-PA APPROVED   Approved Dose/Quantity:   Reference #:     Insurance Company: [employer] [place] - Phone [phone] Fax [phone]  Expected CoPay:       CoPay Card Available:      Foundation Assistance Needed:    Which Pharmacy is filling the prescription (Not needed for infusion/clinic administered): [id number] - [place], [place] - [address] [place] [address]  Pharmacy Notified: Yes- **Instructed pharmacy to notify patient when script is ready to /ship.**  Patient Notified: Yes

## 2019-12-01 DIAGNOSIS — G62.9 NEUROPATHY: ICD-10-CM

## 2019-12-01 DIAGNOSIS — I10 BENIGN ESSENTIAL HYPERTENSION: ICD-10-CM

## 2019-12-02 RX ORDER — GABAPENTIN 300 MG/1
600 CAPSULE ORAL 2 TIMES DAILY PRN
Qty: 120 CAPSULE | Refills: 3 | Status: SHIPPED | OUTPATIENT
Start: 2019-12-02 | End: 2020-10-21

## 2019-12-02 RX ORDER — LISINOPRIL 5 MG/1
TABLET ORAL
Qty: 30 TABLET | Refills: 3 | Status: SHIPPED | OUTPATIENT
Start: 2019-12-02 | End: 2020-06-28

## 2020-01-08 DIAGNOSIS — F33.1 MODERATE EPISODE OF RECURRENT MAJOR DEPRESSIVE DISORDER (H): ICD-10-CM

## 2020-01-08 DIAGNOSIS — F43.10 POSTTRAUMATIC STRESS DISORDER: ICD-10-CM

## 2020-01-10 RX ORDER — SERTRALINE HYDROCHLORIDE 100 MG/1
TABLET, FILM COATED ORAL
Qty: 60 TABLET | Refills: 5 | Status: SHIPPED | OUTPATIENT
Start: 2020-01-10 | End: 2020-04-06

## 2020-02-04 DIAGNOSIS — F43.10 POSTTRAUMATIC STRESS DISORDER: ICD-10-CM

## 2020-02-05 RX ORDER — PRAZOSIN HYDROCHLORIDE 1 MG/1
3 CAPSULE ORAL AT BEDTIME
Qty: 90 CAPSULE | Refills: 7 | Status: SHIPPED | OUTPATIENT
Start: 2020-02-05 | End: 2020-09-30

## 2020-04-03 ENCOUNTER — TELEPHONE (OUTPATIENT)
Dept: FAMILY MEDICINE | Facility: CLINIC | Age: 70
End: 2020-04-03

## 2020-04-03 NOTE — TELEPHONE ENCOUNTER
Reached out to patient during COVID19 Clinic outreach. Reassured patient that [facility] is still open and has started implementing phone and video appointments to help patient remain safe at home.     The phone call was answered by Agustin.    Patient reports the following concerns: depression, bad dreams.    Per patient request, patient will call the clinic and schedule a telephone visit within the next week to discuss these symptoms.    The patient is at max dose of Zoloft and is interested in starting a second anti-depressant medication in place of his as needed hydroxyzine. He is also looking to restart his prazosin. We discussed these things briefly and I informed the patient these changes would be better made during a formal telephone visit, he stated he would call the clinic back to schedule this appointment as soon as we were done speaking.    Shakir Telles MD  PGY2  [facility] Medicine Residency  Pager: [phone]

## 2020-04-06 ENCOUNTER — VIRTUAL VISIT (OUTPATIENT)
Dept: FAMILY MEDICINE | Facility: CLINIC | Age: 70
End: 2020-04-06
Payer: COMMERCIAL

## 2020-04-06 DIAGNOSIS — F33.1 MODERATE EPISODE OF RECURRENT MAJOR DEPRESSIVE DISORDER (H): Primary | ICD-10-CM

## 2020-04-06 RX ORDER — PAROXETINE 10 MG/1
10 TABLET, FILM COATED ORAL EVERY MORNING
Qty: 30 TABLET | Refills: 0 | Status: SHIPPED | OUTPATIENT
Start: 2020-04-06 | End: 2020-04-13

## 2020-04-06 ASSESSMENT — PATIENT HEALTH QUESTIONNAIRE - PHQ9: SUM OF ALL RESPONSES TO PHQ QUESTIONS 1-9: 7

## 2020-04-06 NOTE — PROGRESS NOTES
"Family Medicine Telephone Visit Note           Telephone Visit Consent   Patient was verbally read the following and verbal consent was obtained.  \"I understand that I may revoke this request for a phone visit at any time.  This consent will automatically  3 months from the signed date and time.\"    Name person giving consent:  Patient   Date verbal consent given:  2020  Time verbal consent given:  2:18 PM    Chief Complaint   Patient presents with     Recheck Medication     med recheck      Current Outpatient Medications   Medication Sig Dispense Refill     hydrOXYzine (ATARAX) 25 MG tablet Take 1-2 tablets (25-50 mg) by mouth 3 times daily as needed for anxiety 90 tablet 1     lisinopril (PRINIVIL/ZESTRIL) 5 MG tablet TAKE 1 TABLET BY MOUTH EVERY DAY 30 tablet 3     PARoxetine (PAXIL) 10 MG tablet Take 1 tablet (10 mg) by mouth every morning 30 tablet 0     prazosin (MINIPRESS) 1 MG capsule TAKE 3 CAPSULES (3 MG) BY MOUTH AT BEDTIME 90 capsule 7     gabapentin (NEURONTIN) 300 MG capsule TAKE 2 CAPSULES (600 MG) BY MOUTH 2 TIMES DAILY AS NEEDED FOR NERVE PAIN 120 capsule 3     hydrOXYzine (VISTARIL) 50 MG capsule Take 1 capsule (50 mg) by mouth 3 times daily as needed for anxiety 90 capsule 3     ibuprofen 200 MG capsule Take 800 mg by mouth every 6 hours as needed       Allergies   Allergen Reactions     Codeine Nausea and Vomiting             HPI   Patients name: Agustin  Appointment start time:  2:19 PM    Depression/Anxiety follow up      Your mood since your last visit: Worsened     Medication? He is not taking any antidepressants currently. He is only taking lisinopril. He had been taking Zoloft 100 mg but hasn't been taking this recently. He stopped taking it about a week ago. He will take it inconsistently because he starts feeling good and also because he forgets to take it. He struggles with short term memory. He does also forget to take his lisinopril. Zoloft has been helpful for sadness and " "depression. Prazosin had been helpful for helping him to fall asleep at night but in the past 6 months, it's been a lot worse.     Therapy? No, he declines psychotherapy referral. States he had a good relationship with Dr. Brandi French and he isn't interested in seeing a therapist. He would rather establish with a new doctor.     Exercise? Did not discuss.     What is going well? Feels he is his own support system. He has learned how to be self reliant.     Life Stressors: Having a \"hell of a time right now\" with depression and anxiety. Mood symptoms are long-standing--goes back 30 years ago when he  his wife, lost his house and lost his kids. He was diagnosed with PTSD. He started on Prozac, Zoloft and Paxil lese he cannot recall. He got off medication for an extended period of time (doesn't really like being on medication). He moves to [place] for 20 years working on [employer]. He moved back to [place] to help with his parents who had health issues. He had conflicts with his siblings who he states were only interested in his parents' money. 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 He had a nervous breakdown after 3 years of doing it himself. He then hired caregivers to help him. Both parents  in his arms in his house. He is now being threatened with a financial lawsuit posed by his siblings, which has been delayed due to COVID.     He has struggled with violent nightmares in the past. Prazosin was very helpful for this in the past. He is unable to get more than 2-3 hours of sleep at a time. He also struggles with anger issues, too.     Other associated symptoms :Insomnia, anxiety, anger as described above    How is your sleep? Poor, difficulty initiating and maintaining sleep    New significant life event:Yes-  Legal issues with his siblings    Current substance use: Smokes marijuana, which he has been doing for a long time for chronic pain. He is interested in discussing options of " medical marijuana.     Anxiety / Panic symptoms: Yes-  Anxiety as described above.     Patient checks his vitals every day,.   Recent PHQ-9 Scores:      PHQ-9 SCORE 7/27/2018 6/3/2019 4/6/2020   PHQ-9 Total Score 17 4 7     Recent WANG-7 Scores:      WANG-7 SCORE 7/27/2018   Total Score 21         Today' sPHQ 9 Reviewed. No concern for SI, HI, self harm.      Adherence and Exercise  Medication side effects: not applicable  How often is a medication missed? About 1-3 times per week        Assessment and Plan   1. Moderate episode of recurrent major depressive disorder (H)  Patient is new to me and has long history of depression.  He has been taking Zoloft inconsistently but does not want to stay on this medication. Feels it helps with depression but really hasn't treated his underlying anger and anxiety. We discussed switching to a medication that would be more calming. Will try Paxil at lowest dose to start and plan to up titrate. Follow up within 1 week with PCP.   - PARoxetine (PAXIL) 10 MG tablet; Take 1 tablet (10 mg) by mouth every morning  Dispense: 30 tablet; Refill: 0    Refilled medications that would be required in the next 3 months.     Appointment end time: 2:53 pm  This is a telephone visit that took 35 minutes.      Clinician location:  Verna Orozco MD PGY-3    Discussed with Dr. Sarmiento, attending faculty.

## 2020-04-06 NOTE — PROGRESS NOTES
"Family Medicine Telephone Visit Note  {If PCS start the visit, read the following information verbatim to the patient - DELETE WHEN CONSENT IS OBTAINED}  {Help text -click delete when highlighted-IF NONENGLISH SPEAKING PATIENT  USE LANGUAGE LINE-ACCESS INFORMATION AT THE BOTTOM OF THIS MESSAGE. }           Telephone Visit Consent   Patient was verbally read the following and verbal consent was obtained.  \"I understand that I may revoke this request for a phone visit at any time.  This consent will automatically  3 months from the signed date and time.\"    Name person giving consent:  {PATIENT/ FAMILY MEMBER, :[id number]}   Date verbal consent given:  {today's Date or more:1726984}  Time verbal consent given:  { :2757417}    {HELP TEXT - DELETE when completed.  PCS - Go to \"VIRTUAL\" tab to update CC, allergies, medication list, assessments and BLAIRE UP refills.  If no PCS, provider to update meds. Verbally read PHQ9, GAD7, ACT, etc if indicated}    {PCS Stop Here - DELETE This Text}  Chief Complaint   Patient presents with     Recheck Medication     med recheck      Current Outpatient Medications   Medication Sig Dispense Refill     gabapentin (NEURONTIN) 300 MG capsule TAKE 2 CAPSULES (600 MG) BY MOUTH 2 TIMES DAILY AS NEEDED FOR NERVE PAIN 120 capsule 3     hydrOXYzine (ATARAX) 25 MG tablet Take 1-2 tablets (25-50 mg) by mouth 3 times daily as needed for anxiety 90 tablet 1     hydrOXYzine (VISTARIL) 50 MG capsule Take 1 capsule (50 mg) by mouth 3 times daily as needed for anxiety 90 capsule 3     ibuprofen 200 MG capsule Take 800 mg by mouth every 6 hours as needed       lisinopril (PRINIVIL/ZESTRIL) 5 MG tablet TAKE 1 TABLET BY MOUTH EVERY DAY 30 tablet 3     prazosin (MINIPRESS) 1 MG capsule TAKE 3 CAPSULES (3 MG) BY MOUTH AT BEDTIME 90 capsule 7     sertraline (ZOLOFT) 100 MG tablet TAKE 2 TABLETS BY MOUTH EVERY DAY 60 tablet 5     Allergies   Allergen Reactions     Codeine Nausea and Vomiting         {If " "Provider starts visit, do consent and meds as above using \"VIRTUAL tab.\" For calls - Use [employer] crow to use their \"\" function to call without revealing your cell phone # and show your clinics phone number. Or use *67 before dialing the 10 digit #. DELETE THIS TEXT.}          HPI   Patients name: Agustin  Appointment start time:  { :8200397}      Depression/Anxiety follow up      Your mood since your last visit: { :342645::\"No change\"}    Medication? ***    Therapy? ***    Exercise? ***    What is going well? ***    Life Stressors:***    Other associated symptoms :{ :473147::\"None\"}    How is your sleep? {GOOD/FAIR/POOR:835802}    New significant life event:{ :291235::\"No\"}    Current substance use: { :188149::\"None\"}    Anxiety / Panic symptoms: { :861410::\"No\"}     Recent PHQ-9 Scores:     PHQ-9 SCORE 7/27/2018 6/3/2019   PHQ-9 Total Score 17 4     Recent WANG-7 Scores:      WANG-7 SCORE 7/27/2018   Total Score 21         Today' sPHQ 9 Reviewed and it is *** {STATUS (NO CAPS, MULTIPLE):510125}      Adherence and Exercise  Medication side effects: {YES/NO/NOT ASKED/NOT CROW/Med side effects:392312}  How often is a medication missed? {FREQUENCY PER WEEK:918788}  Exercise:{[facility] Exercise Type:042731} {Exercise frequency days per week:126161}      used via Language Line or similar service.  number: ***          Assessment and Plan   {Diag Picklist:293001}    Refilled medications that would be required in the next 3 months.     After Visit Information:  {avs options:024500}    Appointment end time: { :8200397}  This is a telephone visit that took *** minutes.      Clinician location:  ***    Maggie Orozco MD          { Help text -click delete when highlighted-BILL FOR THIS VISIT!   Billing codes for PROVIDERS (MD//NP/PA)  - 5-10 minutes of medical discussion (09541)  - 11-20 minutes of medical discussion (38671)  - 21-30 minutes of medical discussion (79440)}      {[employer] Language Line Access " "(Help text- F2 to highlight then hit delete)    Dial [phone]    Answer  Welcome to the Language Line Services.  Please enter your six-digit client ID.    [place] enter [id number]  [place] enter [id number]  [facility] enter [id number]  [place] enter [id number]    Answer  \"For [place], press 1.  For all other languages, press 2.\"   they will ask you to say the name of the language.    Press either 1 (yes, correct) or 2 (no, incorrect).}    "

## 2020-04-06 NOTE — PROGRESS NOTES
Preceptor Attestation:  I discussed the patient with the resident. I have verified the content of the note, which accurately reflects my assessment of the patient and the plan of care.   Supervising Physician:  Aldo Sarmiento MD.

## 2020-04-13 ENCOUNTER — VIRTUAL VISIT (OUTPATIENT)
Dept: FAMILY MEDICINE | Facility: CLINIC | Age: 70
End: 2020-04-13
Payer: COMMERCIAL

## 2020-04-13 VITALS — HEIGHT: 65 IN | BODY MASS INDEX: 21.66 KG/M2 | WEIGHT: 130 LBS

## 2020-04-13 DIAGNOSIS — F33.1 MODERATE EPISODE OF RECURRENT MAJOR DEPRESSIVE DISORDER (H): ICD-10-CM

## 2020-04-13 DIAGNOSIS — F43.10 POSTTRAUMATIC STRESS DISORDER: ICD-10-CM

## 2020-04-13 DIAGNOSIS — F41.1 GAD (GENERALIZED ANXIETY DISORDER): ICD-10-CM

## 2020-04-13 DIAGNOSIS — G89.4 CHRONIC PAIN SYNDROME: Primary | ICD-10-CM

## 2020-04-13 RX ORDER — PAROXETINE 20 MG/1
20 TABLET, FILM COATED ORAL EVERY MORNING
Qty: 90 TABLET | Refills: 3 | Status: SHIPPED | OUTPATIENT
Start: 2020-04-13 | End: 2020-09-30

## 2020-04-13 RX ORDER — HYDROXYZINE PAMOATE 25 MG/1
25-50 CAPSULE ORAL 3 TIMES DAILY PRN
Qty: 60 CAPSULE | Refills: 1 | Status: SHIPPED | OUTPATIENT
Start: 2020-04-13 | End: 2020-09-30

## 2020-04-13 ASSESSMENT — MIFFLIN-ST. JEOR: SCORE: 1286.56

## 2020-04-13 NOTE — PROGRESS NOTES
"Family Medicine Telephone Visit Note           Telephone Visit Consent   Patient was verbally read the following and verbal consent was obtained.    \"This telephone visit will be conducted via a call between you and your physician/provider. We have found that certain health care needs can be provided without the need for a physical exam.  This service lets us provide the care you need with a short phone conversation.  If a prescription is necessary we can send it directly to your pharmacy.  If lab work is needed we can place an order for that and you can then stop by our lab to have the test done at a later time.    Telephone visits are billed at different rates depending on your insurance coverage. During this emergency period, for some insurers they may be billed the same as an in-person visit.  Please reach out to your insurance provider with any questions.    If during the course of the call the physician/provider feels a telephone visit is not appropriate, you will not be charged for this service.\"    Name person giving consent:  Patient  Date verbal consent given:  4/13/2020  Time verbal consent given:  8:15 AM       Chief Complaint   Patient presents with     Follow Up     Depression             HPI   Patients name: Agustin  Appointment start time:  8:20 AM    CC: depression    Depression continues to be a struggle for Agustin. Still dealing with legal case regarding his parents' alexandra, he is optimistic that things will be wrapping up regards to this in the next 1-2 months, and is happy about that. Agustin had a telephone visit with our clinic on 4/6, at which time he was switched to Paxil from Zoloft. He had felt like the Zoloft was not working well for all of his symptoms. He is happy with this transition, but is wondering if he should increase his dosage of the Paxil. Agustin shares that in addition to his depressive symptoms, he has been struggling with poor sleep. He says he got used to sleeping 1-2 hours at a time " when he was taking care of his elderly parents. This habit has continued, he struggles falling asleep due to racing thoughts, but then wakes up regularly throughout the night. He does feel like the prazosin has helped him with his nightmares, does not think this is related to his poor sleep    Additionally, Agustin complains of chronic pain in both hands. He has had multiple surgeries in both hands (for arthritis and trigger fingers), recently had trigger finger surgery on both hands. Also experiences chronic pain in the back, although he says this is more tolerable. He is interested in medical marijuana to help with the hand pain. He has used recreational marijuana for more than 40 years and does find it to be helpful. He denies use of any other recreational drugs or substances. Did take gabapentin in the past, did not find it to be helpful, and also it made him drowsy. For the time being, Agustin is not doing much with his hands due to the shelter in place order. He is happy to wait for a medical marijuana visit until this order is rescinded.     Current Outpatient Medications   Medication Sig Dispense Refill     hydrOXYzine (VISTARIL) 25 MG capsule Take 1-2 capsules (25-50 mg) by mouth 3 times daily as needed for anxiety 60 capsule 1     PARoxetine (PAXIL) 20 MG tablet Take 1 tablet (20 mg) by mouth every morning 90 tablet 3     gabapentin (NEURONTIN) 300 MG capsule TAKE 2 CAPSULES (600 MG) BY MOUTH 2 TIMES DAILY AS NEEDED FOR NERVE PAIN 120 capsule 3     ibuprofen 200 MG capsule Take 800 mg by mouth every 6 hours as needed       lisinopril (PRINIVIL/ZESTRIL) 5 MG tablet TAKE 1 TABLET BY MOUTH EVERY DAY 30 tablet 3     prazosin (MINIPRESS) 1 MG capsule TAKE 3 CAPSULES (3 MG) BY MOUTH AT BEDTIME 90 capsule 7     Allergies   Allergen Reactions     Codeine Nausea and Vomiting            Review of Systems:     General: no fevers/chills  Resp: no cough  MSK: Pain in both hands, pain in back  Psych: Depressed mood,  "unchanged. Anxiety, poor sleep         Physical Exam:     Ht 1.651 m (5' 5\")   Wt 59 kg (130 lb)   BMI 21.63 kg/m    Estimated body mass index is 21.63 kg/m  as calculated from the following:    Height as of this encounter: 1.651 m (5' 5\").    Weight as of this encounter: 59 kg (130 lb).    No exam, telephone visit        Assessment and Plan   1. Posttraumatic stress disorder  2. Moderate episode of recurrent major depressive disorder (H)  3. WANG (generalized anxiety disorder)  We discussed the patient's mental health at length today. He feels that he is stable at this time. Interested in increasing dose of Paxil, we will go up from 10mg to 20mg. He will take two tablets for the time being, and refill at 20mg dose. I do see that this medication is not preferred by his insurance. He failed Zoloft in the past, and I feel the profile of Paxil is a good fit for Agustin's symptoms. He will also restart hydroxyzine as needed, starting at 25mg at bedtime prn. We will follow up in 6 weeks to reevaluate.  - hydrOXYzine (VISTARIL) 25 MG capsule; Take 1-2 capsules (25-50 mg) by mouth 3 times daily as needed for anxiety  Dispense: 60 capsule; Refill: 1  - PARoxetine (PAXIL) 20 MG tablet; Take 1 tablet (20 mg) by mouth every morning  Dispense: 90 tablet; Refill: 3    4. Chronic pain syndrome  Patient has dealt with chronic pain for years, mainly in the hands and back. He worked as a custom  for many years, recently retired in part due to hand pain. Has had multiple surgeries, including trigger finger and arthritis surgeries in both hands. Has smoked marijuana 'his whole life' the equivalent of '1 joint per day' and finds it to be very helpful for his pain. He is wondering if obtaining via a medical route might enable him to get more consistent strains of marijuana. He is interested in a medical cannabis visit at our clinic, is happy to wait until Post COVID for this.I will route the chart to the [facility] committee for " evaluation.      Refilled medications that would be required in the next 3 months.     After Visit Information:  Patient declined AVS     No follow-ups on file.    Appointment end time: 8:58 AM  This is a telephone visit that took 38 minutes.      Clinician location:  Verna Telles MD  I precepted today with Dr. Sarmiento, who did speak with the patient on the phone.

## 2020-04-13 NOTE — PROGRESS NOTES
Preceptor Attestation:    I talked to the patient on the phone and discussed the patient with the resident. I have verified the content of the note, which accurately reflects my assessment of the patient and the plan of care.   Supervising Physician:  Aldo Sarmiento MD.

## 2020-06-21 DIAGNOSIS — I10 BENIGN ESSENTIAL HYPERTENSION: ICD-10-CM

## 2020-06-28 RX ORDER — LISINOPRIL 5 MG/1
TABLET ORAL
Qty: 30 TABLET | Refills: 3 | Status: SHIPPED | OUTPATIENT
Start: 2020-06-28 | End: 2020-09-30

## 2020-09-30 ENCOUNTER — OFFICE VISIT (OUTPATIENT)
Dept: FAMILY MEDICINE | Facility: CLINIC | Age: 70
End: 2020-09-30
Payer: COMMERCIAL

## 2020-09-30 VITALS
SYSTOLIC BLOOD PRESSURE: 182 MMHG | HEART RATE: 69 BPM | BODY MASS INDEX: 20.67 KG/M2 | RESPIRATION RATE: 16 BRPM | OXYGEN SATURATION: 98 % | WEIGHT: 124.2 LBS | TEMPERATURE: 98.1 F | DIASTOLIC BLOOD PRESSURE: 89 MMHG

## 2020-09-30 DIAGNOSIS — R07.9 CHEST PAIN, UNSPECIFIED TYPE: ICD-10-CM

## 2020-09-30 DIAGNOSIS — F41.1 GAD (GENERALIZED ANXIETY DISORDER): ICD-10-CM

## 2020-09-30 DIAGNOSIS — I10 BENIGN ESSENTIAL HYPERTENSION: ICD-10-CM

## 2020-09-30 DIAGNOSIS — F33.1 MODERATE EPISODE OF RECURRENT MAJOR DEPRESSIVE DISORDER (H): ICD-10-CM

## 2020-09-30 DIAGNOSIS — F43.10 POSTTRAUMATIC STRESS DISORDER: ICD-10-CM

## 2020-09-30 DIAGNOSIS — G44.201 INTRACTABLE TENSION-TYPE HEADACHE, UNSPECIFIED CHRONICITY PATTERN: Primary | ICD-10-CM

## 2020-09-30 LAB
ALBUMIN SERPL-MCNC: 4.1 MG/DL (ref 3.3–4.9)
ALP SERPL-CCNC: 82.6 U/L (ref 40–150)
ALT SERPL-CCNC: <15 U/L (ref 0–45)
AST SERPL-CCNC: 15.2 U/L (ref 0–55)
BILIRUB SERPL-MCNC: <0.3 MG/DL (ref 0.2–1.3)
BUN SERPL-MCNC: 23.1 MG/DL (ref 7–21)
CALCIUM SERPL-MCNC: 9.7 MG/DL (ref 8.5–10.1)
CHLORIDE SERPLBLD-SCNC: 99.6 MMOL/L (ref 98–110)
CO2 SERPL-SCNC: 26.6 MMOL/L (ref 20–32)
CREAT SERPL-MCNC: 1.2 MG/DL (ref 0.7–1.3)
CRP SERPL-MCNC: 0.2 MG/DL (ref 0–0.8)
ERYTHROCYTE [SEDIMENTATION RATE] IN BLOOD: 11 MM/HR (ref 0–20)
GFR SERPL CREATININE-BSD FRML MDRD: 65.6 ML/MIN/1.7 M2
GLUCOSE SERPL-MCNC: 97.9 MG'DL (ref 70–99)
HCT VFR BLD AUTO: 41.8 % (ref 40–53)
HEMOGLOBIN: 12.7 G/DL (ref 13.3–17.7)
MCH RBC QN AUTO: 28.5 PG (ref 26.5–35)
MCHC RBC AUTO-ENTMCNC: 30.4 G/DL (ref 32–36)
MCV RBC AUTO: 94 FL (ref 78–100)
PLATELET # BLD AUTO: 220 K/UL (ref 150–450)
POTASSIUM SERPL-SCNC: 3.8 MMOL/L (ref 3.2–4.6)
PROT SERPL-MCNC: 6.9 G/DL (ref 6.8–8.8)
RBC # BLD AUTO: 4.5 M/UL (ref 4.4–5.9)
SODIUM SERPL-SCNC: 134.1 MMOL/L (ref 132–142)
TSH SERPL DL<=0.05 MIU/L-ACNC: 0.89 UIU/ML (ref 0.3–5)
WBC # BLD AUTO: 6.7 K/UL (ref 4–11)

## 2020-09-30 RX ORDER — PAROXETINE 30 MG/1
30 TABLET, FILM COATED ORAL EVERY MORNING
Qty: 30 TABLET | Refills: 0 | Status: SHIPPED | OUTPATIENT
Start: 2020-09-30 | End: 2020-10-12

## 2020-09-30 RX ORDER — PRAZOSIN HYDROCHLORIDE 1 MG/1
3 CAPSULE ORAL AT BEDTIME
Qty: 90 CAPSULE | Refills: 7 | Status: SHIPPED | OUTPATIENT
Start: 2020-09-30 | End: 2022-12-29

## 2020-09-30 RX ORDER — HYDROXYZINE PAMOATE 25 MG/1
25-50 CAPSULE ORAL 3 TIMES DAILY PRN
Qty: 60 CAPSULE | Refills: 1 | Status: SHIPPED | OUTPATIENT
Start: 2020-09-30 | End: 2020-10-12

## 2020-09-30 RX ORDER — LISINOPRIL 20 MG/1
20 TABLET ORAL DAILY
Qty: 90 TABLET | Refills: 0 | Status: SHIPPED | OUTPATIENT
Start: 2020-09-30 | End: 2020-10-12

## 2020-09-30 ASSESSMENT — PATIENT HEALTH QUESTIONNAIRE - PHQ9: SUM OF ALL RESPONSES TO PHQ QUESTIONS 1-9: 3

## 2020-09-30 NOTE — PROGRESS NOTES
There are no exam notes on file for this visit.  Chief Complaint   Patient presents with     Nose Problem     Nose blow with blood show     Follow Up     Depression     Headache     Flank Pain     Continous slight sharp pain on left side      Blood pressure (!) 182/89, pulse 69, temperature 98.1  F (36.7  C), temperature source Oral, resp. rate 16, weight 56.3 kg (124 lb 3.2 oz), SpO2 98 %.           SUBJECTIVE       Agustin is a 70 year old  male with a PMH significant for:     Patient Active Problem List   Diagnosis     Hypertension     Hepatitis C virus infection without hepatic coma     Moderate episode of recurrent major depressive disorder (H)     Posttraumatic stress disorder     Chronic neck pain     Crohn's disease in remission (H)     Degeneration of cervical intervertebral disc     Marginal corneal ulcer of left eye     Recurrent erosion of both corneas     Pseudophakia, right eye     He presents with chronic hypertension, headache, chest pain, and depression.    Headache and Hypertension:  He has a history of hospitalization for his hypertension and reports that during that hospitalization he had the worst headache of his life. He has been having headaches for over 10 years and does not take any medications for the pain. This morning his headache was present and upon blowing his nose, blew out a large clot. He reports never having clots in his nose before. He describes the headache as a migrating pain that focuses mainly on his temporal region and his frontal sinuses. He reports no vision changes or photophobia. He does not describe his current headache as the worst headache of his life. Okay with increasing lisinopril.     Chest Pain:  Agustin describes left sided chest pain that is tender and reproducible upon palpation. The pain has been present for several months. He reports that he always has had some SOB but is not exacerbated with the pain. He has not had any diaphoresis, pain radiation, or pleuritic  chest pain. He has no history of MI or CVA.     Depression:   Agustin has battled depression for most of his life after taking care of both of his parent until their deaths and struggling with family stressors. He is currently on paroxetine and hydroxyzine which helps his symptoms. Requesting increase in Paxil.     Hypertension Follow-up  <140/90    Outpatient blood pressures are not being checked.    Chest Pain? : YES Patient reports left sided chest pain that is reproducible to touch. Does not radiate.     Low Salt Diet: Unknown    Daily NSAID Use?No     Did patient take their HTN pills today/last night as usual?  Yes    Last Basic Metabolic Panel:  Lab Results   Component Value Date    .9 07/27/2018      Lab Results   Component Value Date    POTASSIUM 4.0 07/27/2018     Lab Results   Component Value Date    CHLORIDE 98.1 07/27/2018     Lab Results   Component Value Date    MHAESH 10.2 07/27/2018     Lab Results   Component Value Date    CO2 26.2 07/27/2018     Lab Results   Component Value Date    BUN 57.1 07/27/2018     Lab Results   Component Value Date    CR 2.0 07/27/2018     Lab Results   Component Value Date    GLC 90.0 07/27/2018       Adherence and Exercise  Medication side effects: no  How often is a medication missed? Never  Exercise:walking  4-5 days/week for an average of 15-30 minutes     PMH, Medications and Allergies were reviewed and updated as needed.        REVIEW OF SYSTEMS     CONSTITUTIONAL:NEGATIVE for fever, chills, change in weight  RESP:NEGATIVE for significant cough, POSITIVE for SOB  CV: NEGATIVE for palpitations or peripheral edema, POSITIVE for chest pain  MUSCULOSKELETAL: POSITIVE for left midaxillary chest wall pain   PSYCHIATRIC: POSITIVE for anxiety, depressed mood and delusions          OBJECTIVE     Vitals:    09/30/20 1240 09/30/20 1244   BP: (!) 177/82 (!) 182/89   BP Location: Left arm Left arm   Patient Position: Sitting Sitting   Cuff Size: Adult Regular Adult Regular    Pulse: 69    Resp: 16    Temp: 98.1  F (36.7  C)    TempSrc: Oral    SpO2: 98%    Weight: 56.3 kg (124 lb 3.2 oz)      Body mass index is 20.67 kg/m .    Constitutional: Awake, alert, cooperative, no acute distress, and appears stated age.  ENT: sclera clear, conjunctiva normal  Neck: trachea midline  Lungs: No increased WOB, CTABL, no crackles or wheezing appreciated.  Cardiovascular: Pain with palpation of left midaxillary chest wall. RRR, normal S1 and S2, no S3 or S4, and no murmur appreciated.  Abdomen: Normal BS, soft, non-distended, non-tender, no masses palpated  Musculoskeletal: No redness, or warmth. Joints are swollen and painful. Tone is normal.   Neuropsychiatric: Normal affect, orientation, memory and insight. Mood is depressed and anxious.   Skin: warm, dry    Results for orders placed or performed in visit on 09/30/20   CBC with Plt ([facility])     Status: Abnormal   Result Value Ref Range    WBC 6.7 4.0 - 11.0 K/uL    RBC 4.5 4.4 - 5.9 M/uL    Hemoglobin 12.7 (L) 13.3 - 17.7 g/dL    Hematocrit 41.8 40.0 - 53.0 %    MCV 94.0 78.0 - 100.0 fL    MCH 28.5 26.5 - 35.0    MCHC 30.4 (L) 32.0 - 36.0 g/dL    Platelets 220.0 150.0 - 450.0 K/uL       ASSESSMENT AND PLAN     Agustin was seen today for nose problem, follow up, headache and flank pain.    Diagnoses and all orders for this visit:    Intractable tension-type headache, unspecified chronicity pattern  Advised hydration, tylenol, IBU.  -     Thyroid [place] ([facility])  -     Erythrocyte Sedimentation Rate ([facility])  -     C-Reactive Protein ([facility])  -     CBC with Plt ([facility])    Posttraumatic stress disorder  -     prazosin (MINIPRESS) 1 MG capsule; Take 3 capsules (3 mg) by mouth At Bedtime  -     hydrOXYzine (VISTARIL) 25 MG capsule; Take 1-2 capsules (25-50 mg) by mouth 3 times daily as needed for anxiety    Moderate episode of recurrent major depressive disorder (H)  Increased paxil to 30mg from 20mg.   -     hydrOXYzine (VISTARIL) 25 MG  capsule; Take 1-2 capsules (25-50 mg) by mouth 3 times daily as needed for anxiety  -     PARoxetine (PAXIL) 30 MG tablet; Take 1 tablet (30 mg) by mouth every morning    WANG (generalized anxiety disorder)  -     hydrOXYzine (VISTARIL) 25 MG capsule; Take 1-2 capsules (25-50 mg) by mouth 3 times daily as needed for anxiety    Benign essential hypertension  Increased lisinopril to 20 mg daily from 5mg.  -     lisinopril (ZESTRIL) 20 MG tablet; Take 1 tablet (20 mg) by mouth daily  -     Comprehensive Metabolic Panel ([place])    Chest pain, unspecified type  Likely anxiety relater or mild costochondritis. EKG showing NSR.   -     EKG 12-lead complete w/read - Clinics          Return in about 2 weeks (around 10/14/2020) for BP recheck..    Rupinder Tobias, MS3      RESIDENT ATTESTATION:  Patient was seen, evaluated, and discussed with the medical student.  I have independently completed the physical exam and medical decision making. I have verified the content of the note, which accurately reflects my own assessment of the patient and plan of care. Precepted with Dr. Tatum, who agree.     Chase Parker MD  9/30/2020

## 2020-09-30 NOTE — PROGRESS NOTES
Preceptor Attestation:    Patient seen and evaluated in person. I discussed the patient with the resident. I personally viewed the EKG and agree with the interpretation documented by the resident. I have verified the content of the note, which accurately reflects my assessment of the patient and the plan of care.   Supervising Physician:  Onofre Tatum MD.

## 2020-09-30 NOTE — PATIENT INSTRUCTIONS
Agustin was seen today for nose problem, follow up, headache and flank pain.    Diagnoses and all orders for this visit:    Posttraumatic stress disorder  -     prazosin (MINIPRESS) 1 MG capsule; Take 3 capsules (3 mg) by mouth At Bedtime  -     hydrOXYzine (VISTARIL) 25 MG capsule; Take 1-2 capsules (25-50 mg) by mouth 3 times daily as needed for anxiety    Moderate episode of recurrent major depressive disorder (H)  -     hydrOXYzine (VISTARIL) 25 MG capsule; Take 1-2 capsules (25-50 mg) by mouth 3 times daily as needed for anxiety  -     PARoxetine (PAXIL) 30 MG tablet; Take 1 tablet (30 mg) by mouth every morning    Cut one 20 mg tablet in half and take a tablet and a half with a total dose of 30 mg per day until the current bottle of paroxetine is finished. The next bottle you  from the pharmacy should be 30 mg and you will only need to take one of these pills a day.     WANG (generalized anxiety disorder)  -     hydrOXYzine (VISTARIL) 25 MG capsule; Take 1-2 capsules (25-50 mg) by mouth 3 times daily as needed for anxiety    Benign essential hypertension  -     lisinopril (ZESTRIL) 20 MG tablet; Take 1 tablet (20 mg) by mouth daily    Take four 5 mg lisinopril tablets once daily with a total dose of 20 mg per day until the current bottle is finished. The next bottle you  from the pharmacy will have 20 mg tablets and you can take one of those a day.       Please come back for a follow-up in 2 weeks to check your blood pressure.

## 2020-09-30 NOTE — LETTER
October 2, 2020      Agustin Chaudhry  [address]  [place] [address]        Dear ,    We are writing to inform you of your test results.    Your lab results are notable for a low red blood cell count. Please schedule follow up with me or your PCP to discuss further work up, which may include iron studies and colonoscopy. Feel free to contact the clinic with further questions or concerns.     Resulted Orders   Comprehensive Metabolic Panel ([place])   Result Value Ref Range    Albumin 4.1 3.3 - 4.9 mg/dL    Alkaline Phosphatase 82.6 40.0 - 150.0 U/L    ALT <15 0.0 - 45.0 U/L    AST 15.2 0.0 - 55.0 U/L    Bilirubin Total <0.3 0.2 - 1.3 mg/dL    Urea Nitrogen 23.1 (H) 7.0 - 21.0 mg/dL    Calcium 9.7 8.5 - 10.1 mg/dL    Chloride 99.6 98.0 - 110.0 mmol/L    Carbon Dioxide 26.6 20.0 - 32.0 mmol/L    Creatinine 1.2 0.7 - 1.3 mg/dL    Glucose 97.9 70.0 - 99.0 mg'dL    Potassium 3.8 3.2 - 4.6 mmol/L    Sodium 134.1 132.0 - 142.0 mmol/L    Protein Total 6.9 6.8 - 8.8 g/dL    GFR Estimate 65.6 >60.0 mL/min/1.7 m2    GFR Estimate If Black 79.4 >60.0 mL/min/1.7 m2   Thyroid [place] ([facility]   Result Value Ref Range    TSH 0.89 0.30 - 5.00 uIU/mL    Narrative    Test performed by:  [facility]  [address] [place] [address]   Erythrocyte Sedimentation Rate ([facility])   Result Value Ref Range    Sed Rate 11 0 - 20 mm/hr   C-Reactive Protein ([facility])   Result Value Ref Range    C-Reactive Protein 0.2 0.0 - 0.8 mg/dL    Narrative    Test performed by:  [facility]  [address] [place] [address]   CBC with Plt ([facility])   Result Value Ref Range    WBC 6.7 4.0 - 11.0 K/uL    RBC 4.5 4.4 - 5.9 M/uL    Hemoglobin 12.7 (L) 13.3 - 17.7 g/dL    Hematocrit 41.8 40.0 - 53.0 %    MCV 94.0 78.0 - 100.0 fL    MCH 28.5 26.5 - 35.0    MCHC 30.4 (L) 32.0 - 36.0 g/dL    Platelets 220.0 150.0 - 450.0 K/uL       If you have any questions or concerns, please call the clinic at the number  listed above.       Sincerely,        Chase Parker MD

## 2020-10-01 NOTE — RESULT ENCOUNTER NOTE
Your lab results are notable for a low red blood cell count. Please schedule follow up with me or your PCP to discuss further work up, which may include iron studies and colonoscopy. Feel free to contact the clinic with further questions or concerns.

## 2020-10-12 ENCOUNTER — OFFICE VISIT (OUTPATIENT)
Dept: FAMILY MEDICINE | Facility: CLINIC | Age: 70
End: 2020-10-12
Payer: COMMERCIAL

## 2020-10-12 VITALS
TEMPERATURE: 98.2 F | SYSTOLIC BLOOD PRESSURE: 157 MMHG | HEIGHT: 65 IN | RESPIRATION RATE: 16 BRPM | OXYGEN SATURATION: 96 % | BODY MASS INDEX: 21.59 KG/M2 | HEART RATE: 69 BPM | DIASTOLIC BLOOD PRESSURE: 72 MMHG | WEIGHT: 129.6 LBS

## 2020-10-12 DIAGNOSIS — F33.1 MODERATE EPISODE OF RECURRENT MAJOR DEPRESSIVE DISORDER (H): ICD-10-CM

## 2020-10-12 DIAGNOSIS — I10 BENIGN ESSENTIAL HYPERTENSION: ICD-10-CM

## 2020-10-12 DIAGNOSIS — F41.1 GAD (GENERALIZED ANXIETY DISORDER): ICD-10-CM

## 2020-10-12 DIAGNOSIS — F43.10 POSTTRAUMATIC STRESS DISORDER: ICD-10-CM

## 2020-10-12 DIAGNOSIS — M79.643 CHRONIC HAND PAIN, UNSPECIFIED LATERALITY: Primary | ICD-10-CM

## 2020-10-12 DIAGNOSIS — D64.9 LOW HEMOGLOBIN: ICD-10-CM

## 2020-10-12 DIAGNOSIS — G89.29 CHRONIC HAND PAIN, UNSPECIFIED LATERALITY: Primary | ICD-10-CM

## 2020-10-12 LAB
ERYTHROCYTE [SEDIMENTATION RATE] IN BLOOD: 14 MM/HR (ref 0–20)
FERRITIN SERPL-MCNC: 53 NG/ML (ref 27–300)
IRON SATN MFR SERPL: 24 % (ref 20–50)
IRON SERPL-MCNC: 72 UG/DL (ref 42–175)
IRON SERPL-MCNC: 72 UG/DL (ref 42–175)
TIBC SERPL-MCNC: 301 UG/DL (ref 313–563)
TRANSFERRIN SERPL-MCNC: 241 MG/DL (ref 212–360)

## 2020-10-12 PROCEDURE — 36415 COLL VENOUS BLD VENIPUNCTURE: CPT | Performed by: STUDENT IN AN ORGANIZED HEALTH CARE EDUCATION/TRAINING PROGRAM

## 2020-10-12 PROCEDURE — 99214 OFFICE O/P EST MOD 30 MIN: CPT | Mod: GC | Performed by: STUDENT IN AN ORGANIZED HEALTH CARE EDUCATION/TRAINING PROGRAM

## 2020-10-12 PROCEDURE — 85651 RBC SED RATE NONAUTOMATED: CPT | Performed by: STUDENT IN AN ORGANIZED HEALTH CARE EDUCATION/TRAINING PROGRAM

## 2020-10-12 RX ORDER — NAPROXEN 500 MG/1
500 TABLET ORAL 2 TIMES DAILY WITH MEALS
Qty: 180 TABLET | Refills: 3 | Status: SHIPPED | OUTPATIENT
Start: 2020-10-12 | End: 2021-10-06

## 2020-10-12 RX ORDER — PAROXETINE 30 MG/1
30 TABLET, FILM COATED ORAL EVERY MORNING
Qty: 30 TABLET | Refills: 0 | Status: SHIPPED | OUTPATIENT
Start: 2020-10-12 | End: 2020-10-15

## 2020-10-12 RX ORDER — HYDROXYZINE PAMOATE 25 MG/1
25-50 CAPSULE ORAL 3 TIMES DAILY PRN
Qty: 60 CAPSULE | Refills: 1 | Status: SHIPPED | OUTPATIENT
Start: 2020-10-12 | End: 2021-11-30

## 2020-10-12 RX ORDER — LISINOPRIL 40 MG/1
40 TABLET ORAL DAILY
Qty: 90 TABLET | Refills: 3 | Status: SHIPPED | OUTPATIENT
Start: 2020-10-12 | End: 2021-01-04

## 2020-10-12 ASSESSMENT — MIFFLIN-ST. JEOR: SCORE: 1271.8

## 2020-10-12 NOTE — PROGRESS NOTES
SUBJECTIVE       Agustin Chaudhry is a 70 year old  male with a PMH significant for:     Patient Active Problem List   Diagnosis     Hypertension     Hepatitis C virus infection without hepatic coma     Moderate episode of recurrent major depressive disorder (H)     Posttraumatic stress disorder     Chronic neck pain     Crohn's disease in remission (H)     Degeneration of cervical intervertebral disc     Marginal corneal ulcer of left eye     Recurrent erosion of both corneas     Pseudophakia, right eye       He presents with multiple complaints.    First, the patient has continued to struggle intermittently with his depression/PTSD.  He reminded me today of the difficult experience he has had dealing with his parents estate following their recent passing.  There has been a lot of family disagreement between him and his siblings about this, causes him a great deal of distress.  Patient is been taking Paxil, which he finds beneficial.  In the past, he had previously failed Zoloft.  Patient does feel that the increase in dosage might help him further, this was prescribed his last visit but was not covered by insurance, thus the patient incurred extra cost for this prescription.    Additionally, patient reports significant discomfort from chronic pain in his hands.  He states that for a long time, he has had bilateral hand pain that affects him around the base of the thumb, as well as the front and back of his index fingers and pinky fingers for the most part.  Patient worked as a craftsman for many years, building interiors of commercial buildings.  He has been following with a hand plastics surgeon who was performed a number of operations, including bilateral CMC arthroplasty and multiple trigger finger releases, patient states that he has gotten some amount of relief from these, but certainly not total relief.  States that at this point, he has pain if he bumps his fingers on things, and finds difficult to  "engage in normal activities he likes, handywork and such.  He is currently taking 800 mg of ibuprofen twice daily for this.  He also smokes marijuana, which he finds to be somewhat helpful.  He obtains this off the street.    PMH, Medications and Allergies were reviewed and updated as needed.        REVIEW OF SYSTEMS     CONSTITUTIONAL: NEGATIVE for fever, chills, change in weight  INTEGUMENTARY/SKIN: NEGATIVE for worrisome rashes, moles or lesions  RESP: NEGATIVE for significant cough or SOB  GI: NEGATIVE for nausea, abdominal pain, heartburn, or change in bowel habits  MUSCULOSKELETAL: Bilateral hand pain  NEURO: NEGATIVE for weakness, dizziness or paresthesias  PSYCHIATRIC: Mildly depressed mood, stable        OBJECTIVE     Vitals:    10/12/20 1059 10/12/20 1102   BP: (!) 152/69 (!) 157/72   BP Location: Left arm Left arm   Patient Position: Sitting Sitting   Cuff Size: Adult Regular Adult Regular   Pulse: 69    Resp: 16    Temp: 98.2  F (36.8  C)    TempSrc: Oral    SpO2: 96%    Weight: 58.8 kg (129 lb 9.6 oz)    Height: 1.638 m (5' 4.5\")      Body mass index is 21.9 kg/m .    Constitutional: Awake, alert, cooperative, no apparent distress, and appears stated age.  Eyes: Lids and lashes normal, pupils equal, round and reactive to light, extra ocular muscles intact, sclera clear, conjunctiva normal.  Lungs: No increased work of breathing, good air exchange, clear to auscultation bilaterally, no crackles or wheezing.  Cardiovascular: Regular rate and rhythm, normal S1 and S2  Musculoskeletal: No redness, warmth, or swelling of the joints of the hands.  Has pain with both active and passive range of motion of bilateral index fingers at all joints.  Normal sensation to light touch throughout both hands.  No reproducible Tinel sign at the carpal tunnel.  Unable to elicit triggering of any fingers.  There are numerous postoperative scars on both hands.  Neurologic: Awake, alert, oriented to name, place and time.  " Cranial nerves II-XII are grossly intact.   Neuropsychiatric: Normal affect, mood, orientation, memory and insight.  Skin: Hands as above.  Otherwise no rashes, erythema, pallor, petechia or purpura.      ASSESSMENT AND PLAN     1. Moderate episode of recurrent major depressive disorder (H)  Patient is doing fairly well on current medication regimen for his depression.  Does feel like he could experience additional improvement, he is interested in increasing dose of his Paxil.  We discussed that his insurance company denied this initially.  I do think that it is an appropriate medication for him and that prior authorization would be approved given the fact that the side effect profile this medication is specifically tailored to his benefit.  However, on review, he should be able to obtain this medication with a good Rx card at [facility] for an acceptably low price.  It appears that [facility] would be a good option for him as well.  - PARoxetine (PAXIL) 30 MG tablet; Take 1 tablet (30 mg) by mouth every morning  Dispense: 30 tablet; Refill: 0  - hydrOXYzine (VISTARIL) 25 MG capsule; Take 1-2 capsules (25-50 mg) by mouth 3 times daily as needed for anxiety  Dispense: 60 capsule; Refill: 1    2. Chronic hand pain, unspecified laterality  Patient with long history of chronic bilateral hand pain.  He is followed for a long time with hand specialist for this.  We have worked together and performed numerous operations to try to improve the patient's pain.  Fortunately, he continues to experience debilitating pain.  Patient believes that the pain he experiences is likely contributing to his poor mental health, I think that this is quite possible.  The etiology of this pain is not entirely clear to me, especially with the lack of improvement with surgeries.  On history and exam, I cannot identify any other obvious etiologies.  No evident swelling, although patient did take 800 mg of ibuprofen this morning.  I do not see that  patient has ever had thorough work-up to rule out rheumatologic disease, we will check an ESR today.  If there is any indication of inflammation, could refer to rheumatology.  Otherwise, I think this is good candidate for pain/palliative care with Dr. Ashby.  I placed this referral today, and in the meantime we will have the patient take 500 mg of naproxen twice daily rather than the ibuprofen for longer duration of effect.  - Erythrocyte Sedimentation Rate ([facility])  - naproxen (NAPROSYN) 500 MG tablet; Take 1 tablet (500 mg) by mouth 2 times daily (with meals)  Dispense: 180 tablet; Refill: 3  - PAIN MANAGEMENT REFERRAL; Future    3. Low hemoglobin  Slightly low hemoglobin noted on recent lab work, previous physician indicated preference to evaluate for etiology.  We will draw iron studies today, consider colonoscopy as soon as possible.  - Iron Transferrin Saturat ([facility])  - Ferritin ([facility])  - Iron ([facility])    4. Benign essential hypertension  Blood pressure moderately elevated today.  Patient doing well on 20 mg of lisinopril, we will max out the dose of this medication prior to starting any new medications.  - lisinopril (ZESTRIL) 40 MG tablet; Take 1 tablet (40 mg) by mouth daily  Dispense: 90 tablet; Refill: 3      I ended our visit today by discussing the patient's diagnoses and recommended treatment. Please refer to today's diagnoses and orders for further details. I briefly discussed the pathophysiology of these conditions and outlined their expected course. I discussed the warning symptoms and signs that indicate an atypical course that would need urgent or emergent care. I also discussed self care strategies for symptom relief. Patient voiced understanding of plan of care and was in full agreement to proceed as discussed.    RTC in about 4 weeks for follow up of multiple problems or sooner if develops new or worsening symptoms.    Patient staffed with Dr. Disha Telles MD

## 2020-10-13 DIAGNOSIS — F33.1 MODERATE EPISODE OF RECURRENT MAJOR DEPRESSIVE DISORDER (H): ICD-10-CM

## 2020-10-13 NOTE — PROGRESS NOTES
Preceptor Attestation:    Patient seen and evaluated in person. I discussed the patient with the resident. I have verified the content of the note, which accurately reflects my assessment of the patient and the plan of care.   Supervising Physician:  Robert Gauthier MD.

## 2020-10-14 ENCOUNTER — AMBULATORY - HEALTHEAST (OUTPATIENT)
Dept: PALLIATIVE MEDICINE | Facility: OTHER | Age: 70
End: 2020-10-14

## 2020-10-14 DIAGNOSIS — M79.643 CHRONIC HAND PAIN, UNSPECIFIED LATERALITY: ICD-10-CM

## 2020-10-14 DIAGNOSIS — G89.29 CHRONIC HAND PAIN, UNSPECIFIED LATERALITY: ICD-10-CM

## 2020-10-14 NOTE — PATIENT INSTRUCTIONS
10/14/20   PAIN MANAGEMENT REFERRAL  [facility]  Phone: [phone]  Fax: [phone]    [facility]  [address]  [place] [address]  Faxed referral, demographics, office note and current medication list to [phone]. They will contact patient to schedule.     Mary Lake

## 2020-10-15 RX ORDER — PAROXETINE 30 MG/1
TABLET, FILM COATED ORAL
Qty: 30 TABLET | Refills: 0 | Status: SHIPPED | OUTPATIENT
Start: 2020-10-15 | End: 2020-10-27

## 2020-10-21 ENCOUNTER — OFFICE VISIT (OUTPATIENT)
Dept: FAMILY MEDICINE | Facility: CLINIC | Age: 70
End: 2020-10-21
Payer: COMMERCIAL

## 2020-10-21 VITALS
RESPIRATION RATE: 16 BRPM | HEART RATE: 69 BPM | TEMPERATURE: 98.1 F | BODY MASS INDEX: 21.85 KG/M2 | SYSTOLIC BLOOD PRESSURE: 148 MMHG | WEIGHT: 128 LBS | OXYGEN SATURATION: 99 % | HEIGHT: 64 IN | DIASTOLIC BLOOD PRESSURE: 86 MMHG

## 2020-10-21 DIAGNOSIS — M79.641 PAIN IN BOTH HANDS: Primary | ICD-10-CM

## 2020-10-21 DIAGNOSIS — I10 ESSENTIAL HYPERTENSION: ICD-10-CM

## 2020-10-21 DIAGNOSIS — M79.642 PAIN IN BOTH HANDS: Primary | ICD-10-CM

## 2020-10-21 LAB
AMPHETAMINES QUAL: NEGATIVE
BARBITURATES QUAL URINE: NEGATIVE
BENZODIAZEPINE QUAL URINE: NEGATIVE
BUPRENORPHINE QUAL URINE: NEGATIVE
CANNABINOIDS UR QL SCN: POSITIVE
COCAINE QUAL URINE: NEGATIVE
METHAMPHETAMINE: NEGATIVE
METHODONE QUAL: NEGATIVE
MORPHINE QUAL: NEGATIVE
OXYCODONE QUAL: NEGATIVE
PHENCYCLIDINE: NEGATIVE
PROPOXYPHENE: NEGATIVE
TEMPERATURE OF URINE WAS BETWEEN 90-100 DEGREES F: YES
TRICYCLIC ANTIDEPRESSANTS: NEGATIVE
VIT B12 SERPL-MCNC: 533 PG/ML (ref 213–816)

## 2020-10-21 PROCEDURE — 99214 OFFICE O/P EST MOD 30 MIN: CPT | Performed by: FAMILY MEDICINE

## 2020-10-21 PROCEDURE — 80306 DRUG TEST PRSMV INSTRMNT: CPT | Performed by: FAMILY MEDICINE

## 2020-10-21 RX ORDER — AMLODIPINE BESYLATE 5 MG/1
5 TABLET ORAL DAILY
Qty: 90 TABLET | Refills: 3 | Status: SHIPPED | OUTPATIENT
Start: 2020-10-21 | End: 2021-02-08

## 2020-10-21 ASSESSMENT — MIFFLIN-ST. JEOR: SCORE: 1264.35

## 2020-10-21 NOTE — PROGRESS NOTES
"Subjective: Agustin Chaudhry is a 70 year old who presents today for     He has a h/o Crohn's which is apparently in remission although he still has daily loose stools.    He is interested in pursuing medical cannabis for his chronic arthritis pain.  He was diagnosed with Straight Tabletop Claw Fist.  He has been following with a hand plastics surgeon who was performed a number of operations, including bilateral CMC arthroplasty and multiple trigger finger releases, patient states that he has gotten some amount of relief from these, but certainly not total relief.  He is a regular user of marijuana which he buys off the street.  It helps somewhat but he is hoping that medical cannabis would be a more effective way to alleviate his chronic hand pain as well as neck pain and tingling in the feet.    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    PMHX/PSHX/MEDS/ALLERGIES/SHX/FHX reviewed and updated in Epic.   ROS:   General: No fevers, chills   Head: No headache   CV: No chest pain or palpitations.   Resp: No shortness of breath. No cough. No hemoptysis.   GI: No nausea or vomiting.  No constipation or diarrhea.  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  He is on no chronic opioids or other controlled substances.  He is a regular user of marijuana which he obtains from the street.  It seems that he would be a " reasonable candidate for medical cannabis.  To go forward with this we will have him seen by our therapist and will get a urine drug screen today.    Blood pressure is high today as it has been in the past here in our clinic.  He checks it at home and it is typically in the 170s at home.  He has had systolics above 200.  We will add amlodipine 5 mg a day.    At a future visit we can discuss statin therapy, etc.  1. Pain in both hands    - Rapid Urine Drug Screen ([facility])  - MENTAL HEALTH REFERRAL  -  - Vitamin B12 ([facility])    2. Essential hypertension    - amLODIPine (NORVASC) 5 MG tablet; Take 1 tablet (5 mg) by mouth daily  Dispense: 90 tablet; Refill: 3    Total of 28 minutes was spent in face to face contact with patient with > 50% in counseling and coordination of care.  Options for treatment and/or follow-up care were reviewed with the patient. Agustin Chaudhry was engaged and actively involved in the decision making process. He verbalized understanding of the options discussed and was satisfied with the final plan.        Graham Barron MD

## 2020-10-26 DIAGNOSIS — G89.29 CHRONIC HAND PAIN, UNSPECIFIED LATERALITY: ICD-10-CM

## 2020-10-26 DIAGNOSIS — M79.643 CHRONIC HAND PAIN, UNSPECIFIED LATERALITY: ICD-10-CM

## 2020-10-26 DIAGNOSIS — F33.1 MODERATE EPISODE OF RECURRENT MAJOR DEPRESSIVE DISORDER (H): ICD-10-CM

## 2020-10-26 NOTE — TELEPHONE ENCOUNTER
"Per [facility], \"Prior Authorization is needed for PARoxetine HCI 30MG Tablets.  To submit the PA, Please follow the instructions below:  Login to [email or web address] and click\"Enter a Key\"  Key: [id number],  Patient Last Name: LAINE, : 1950.\"  Dr. Telles, please let me know if you need me to do anything with this.  Thank you.  ANGELICA Macias   "

## 2020-10-27 RX ORDER — PAROXETINE 30 MG/1
30 TABLET, FILM COATED ORAL EVERY MORNING
Qty: 30 TABLET | Refills: 0 | Status: SHIPPED | OUTPATIENT
Start: 2020-10-27 | End: 2021-01-28

## 2020-10-27 NOTE — TELEPHONE ENCOUNTER
I called the patient to discuss this refill. We agreed to continue with Paxil because it seems to be working well for his symptoms. Unfortunately, his insurance will not cover it and is requesting prior authorization. I will proceed with this. But in the meantime, the patient is able to use "[facility] to buy this medication outside of his insurance for an acceptable price.    Additionally, the patient reports some dizziness and low blood pressures to 100/54 that occurred last week right before he was about to start amlodipine 5mg that was prescribed on 10/21. Due to these symptoms, he did not start the medication. I told him I agreed with this decision. He will continue to check his BP once per day and will follow up with me in 2 weeks as previously discussed to talk about depression and HTN.    Shakir Telles MD  PGY3  [facility]  Pager: [phone]

## 2020-10-29 ENCOUNTER — TELEPHONE (OUTPATIENT)
Dept: FAMILY MEDICINE | Facility: CLINIC | Age: 70
End: 2020-10-29

## 2020-10-29 ENCOUNTER — DOCUMENTATION ONLY (OUTPATIENT)
Dept: PSYCHOLOGY | Facility: CLINIC | Age: 70
End: 2020-10-29

## 2020-10-29 NOTE — PROGRESS NOTES
Behavioral Health Team,    Patient is being referred for mental health services by their provider, Dr. Barron.  Note and order indicate that this is a referral for a  CPM visit.     -No answer and no VM william Lake  10/29/2020

## 2020-10-29 NOTE — TELEPHONE ENCOUNTER
We received a Notice of Approval of Medicare Prescription Drug Coverage from Macrina Du for PAROXETINE HCL 30MG TABLET (effective 07/30/2020 through 10/28/2021), so called patient to notify him along with his pharmacy.  Thank you.  ANGELICA Macias

## 2020-10-29 NOTE — PATIENT INSTRUCTIONS
10/29/20   MENTAL HEALTH REFERRAL    Mental Health referral routed to behavioral health team for recommendations. See Documentation Only encounter for more information.    Mary Lake

## 2020-10-29 NOTE — LETTER
November 9, 2020      Agustin Chaudhry  [address]  [place] [address]        Dear Agustin,    I have been unsuccessful reaching you via phone. At your most recent appointment here at the [facility] you were referred to talk to one of our therapist which is required in the process to become certified for medical cannabis. To schedule this please contact me at [phone] and I would be happy to help you.    Sincerely,    Mary Beckham Corrdinator  [phone]

## 2020-11-09 NOTE — PROGRESS NOTES
Second attempt to contact pt. Again no answer and no VM available. Will send letter to pt today.     Mary Lake

## 2020-12-01 ENCOUNTER — VIRTUAL VISIT (OUTPATIENT)
Dept: PSYCHOLOGY | Facility: CLINIC | Age: 70
End: 2020-12-01
Payer: COMMERCIAL

## 2020-12-01 DIAGNOSIS — G89.4 CHRONIC PAIN SYNDROME: Primary | ICD-10-CM

## 2020-12-01 PROCEDURE — 96156 HLTH BHV ASSMT/REASSESSMENT: CPT | Mod: U7 | Performed by: STUDENT IN AN ORGANIZED HEALTH CARE EDUCATION/TRAINING PROGRAM

## 2020-12-01 PROCEDURE — 99207 PR NO CHARGE LOS: CPT | Mod: TEL | Performed by: STUDENT IN AN ORGANIZED HEALTH CARE EDUCATION/TRAINING PROGRAM

## 2020-12-01 ASSESSMENT — ANXIETY QUESTIONNAIRES
6. BECOMING EASILY ANNOYED OR IRRITABLE: NOT AT ALL
4. TROUBLE RELAXING: NOT AT ALL
1. FEELING NERVOUS, ANXIOUS, OR ON EDGE: NEARLY EVERY DAY
5. BEING SO RESTLESS THAT IT IS HARD TO SIT STILL: NOT AT ALL
2. NOT BEING ABLE TO STOP OR CONTROL WORRYING: NOT AT ALL
3. WORRYING TOO MUCH ABOUT DIFFERENT THINGS: SEVERAL DAYS
7. FEELING AFRAID AS IF SOMETHING AWFUL MIGHT HAPPEN: NOT AT ALL
GAD7 TOTAL SCORE: 4

## 2020-12-01 ASSESSMENT — PATIENT HEALTH QUESTIONNAIRE - PHQ9: SUM OF ALL RESPONSES TO PHQ QUESTIONS 1-9: 5
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
## 2020-12-01 NOTE — PATIENT INSTRUCTIONS
Personal Care Plan for Chronic Pain    1.  Personal Goals:    *  Social/Relational Goals: Continue to engage in health social relationship!   *  Other Functional Goals (e.g., hobbies, daily activities, etc.): Continue to stay     2.  Sleep:     *  Basic sleep plan:    *reduce or eliminate caffeine and daytime naps    * relaxation before bed    * limit screen time 1-2 hours prior to bed    * establish dark/quiet sleep environment    * chose and stick to a consistent bedtime.    * chose and keep consistent wake time    * avoid doing things in bed other than sleep such as reading or worrying    3.  Physical Activity:     * Complete physical therapy exercises for hands each morning.   * Continue to stay active!   * Listen to your body.  Pace yourself for success.  Don't over-do it.  Don't under do it.   * Balance activities with rest and set realistic goals.     4.  Nutrition/Weight:     * Try to eat at least 5 servings of fresh fruits and vegetables each day.   * Limit processed foods and foods high in sugar, sodium and fat.   * Maintain a healthy weight.     5.  Mood/Stress Management:    * Formal interventions:    * Counseling  * Support group or therapy group   * Home/community based interventions:  * Relaxation techniques  * Meditation  * Yoga  * Creative activity  * Spiritual /prayer  * Service-based activity.   * Medications: Paroxetine 30mg in the morning, Hydroxyzine PRN    6.  Tobacco/Alcohol/Drug Use:      * Continue to refrain from using recreational drugs and tobacco   * Maintain healthy relationship with alcohol through abstinence or stick to no more than 2 drinks per day   * Keep your provider(s) updated on the amount of cannabis you use.    7.  Pain:     * Non-medication treatments:    * ice/heat    * massage    * acupuncture    * chiropractor    * other: physical therapy stretches/exercises for hands    8.  Pain Medications: Naproxen    There is not currently any follow up with behavioral health  scheduled at this time, contact your primary care provider in the future if you would like to engage in services for help with sleep, low mood, etc.    Care Plan Date: December/2020     [facility]  Behavioral Health  [phone]    First Session Patient Information Sheet    Helsarah! My name is Johnna Main.  I earned my doctorate in Counseling Psychology at [employer]. I am currently in a postdoctoral fellowship here at the [facility] to receive specialized training. The focus of my specialized training is behavioral health in primary care. I am also working to complete my supervised hours to become a Licensed Psychologist in the [facility].     My direct supervisor at [place] is Dr. Shelbie Bustos, Ph.D. She is a Licensed Psychologist. I meet with her individually for supervision of my training and clinical caseload. Just like the residents you may have worked with, the work you and I complete together will be billed under my supervisor's name. Therefore, you will likely see reports from your insurance with Dr. Bustos's name rather than mine. She can be reached at [phone] if you have any questions or concerns.       Your first 1-2 sessions are focused on assessment. That means I will be finding out what brought you to the clinic and what you are hoping to get out of therapy. I will also ask you a lot of questions about your life including questions about your relationships, emotions, behaviors, experiences, childhood, and family. I may also ask you to complete several questionnaires and checklists as part of that assessment process.      After the assessment is completed, we will discuss the information you gave me and work together to develop a plan for your therapy. Part of our discussion will include the most appropriate therapist to provide you with services. Most times, I will continue with your care unless it becomes clear that we need to refer you to a different therapist  who can better meet your specific needs.      Most patients attend appointments once a week or once every other week. However, we will discuss a schedule that best fits your needs.       Due to confidentiality rules, I cannot exchange e-mail with patients. If you need to reach me, please call [phone].      I am committed to providing my patients with the best care possible. That means I will provide you with therapy that is the best fit for your unique needs and preferences. If there is anything you would like me to do differently, please let me know at any time.    Thank you, and I look forward to working with you!    Johnna Main Psy.D.  Pronouns: They/Them  Primary Care Behavioral Health Fellow

## 2020-12-04 NOTE — PROGRESS NOTES
I have reviewed and agree with the behavioral health fellow's documentation for this visit.  I did not personally see the patient.  Shelbie Bustos, PhD., LP

## 2020-12-04 NOTE — ASSESSMENT & PLAN NOTE
Chronic pain diagnosis: 12/4/2020  DIRE: score 19 initial date 12/1/2020, most recent update **     (14-21: may be a candidate for opioid therapy)  ORT:  score 2, initial date 12/1/20, most recent update **    (Low Risk 0 - 3, Moderate Risk 4 - 7, High Risk > 8)  RIOSORD score:  8  Average probability of overdose or serious opioid-induced respiratory depression in the next six months (bold appropriate risk level below)                        Points                                                                   Risk                        0-4                                                                                   2%                        5-7                                                                                   5%                        8-9                                                                                   7%                        10-17                                                                               15%                        18-25                                                                               30%                        26-41                                                                               55%                        42                                                                                    83%  FAQ: baseline score 60/100, date 12/1/20, most recent update **   Behavioral Health Consultation: date 12/1/2020, provider, Dr. Main  Personal Care Plan for Chronic Pain: initial date 12/1/2020, most recent update **   Opioid treatment agreement: initial date **, provider, **, date of most recent update **    This patient has completed CPM assessment and has been deemed a poor candidate for opiate therapy at this time.  No opiates should be prescribed for this patient.   OR  Monthly medication(s): **, dose, # provided  Morphine equivalents = ** mg/ day   MME > 90, ORT >7, or DIRE<14 require review by CPM supervisory committee.

## 2020-12-20 DIAGNOSIS — I10 BENIGN ESSENTIAL HYPERTENSION: ICD-10-CM

## 2020-12-21 RX ORDER — LISINOPRIL 20 MG/1
TABLET ORAL
Qty: 90 TABLET | Refills: 0 | Status: SHIPPED | OUTPATIENT
Start: 2020-12-21 | End: 2021-02-09

## 2020-12-22 DIAGNOSIS — I10 BENIGN ESSENTIAL HYPERTENSION: ICD-10-CM

## 2021-01-04 RX ORDER — LISINOPRIL 40 MG/1
40 TABLET ORAL DAILY
Qty: 90 TABLET | Refills: 3 | Status: SHIPPED | OUTPATIENT
Start: 2021-01-04 | End: 2021-02-08

## 2021-01-12 ENCOUNTER — HOSPITAL ENCOUNTER (OUTPATIENT)
Dept: PALLIATIVE MEDICINE | Facility: OTHER | Age: 71
Discharge: HOME | End: 2021-01-12
Attending: NURSE PRACTITIONER

## 2021-01-12 DIAGNOSIS — G89.29 CHRONIC INTRACTABLE PAIN: ICD-10-CM

## 2021-01-12 DIAGNOSIS — M79.641 PAIN IN BOTH HANDS: ICD-10-CM

## 2021-01-12 DIAGNOSIS — F43.10 PTSD (POST-TRAUMATIC STRESS DISORDER): ICD-10-CM

## 2021-01-12 DIAGNOSIS — M47.812 FACET ARTHROPATHY, CERVICAL: ICD-10-CM

## 2021-01-12 DIAGNOSIS — F41.9 ANXIETY: ICD-10-CM

## 2021-01-12 DIAGNOSIS — M79.642 PAIN IN BOTH HANDS: ICD-10-CM

## 2021-01-12 DIAGNOSIS — F33.40 RECURRENT MAJOR DEPRESSIVE DISORDER, IN REMISSION (H): ICD-10-CM

## 2021-01-13 ENCOUNTER — HOSPITAL ENCOUNTER (OUTPATIENT)
Dept: PALLIATIVE MEDICINE | Facility: OTHER | Age: 71
Discharge: HOME | End: 2021-01-13

## 2021-01-13 DIAGNOSIS — G89.4 CHRONIC PAIN SYNDROME: ICD-10-CM

## 2021-01-15 LAB
6MAM UR QL: NOT DETECTED
7AMINOCLONAZEPAM UR QL: NOT DETECTED
A-OH ALPRAZ UR QL: NOT DETECTED
ALPRAZ UR QL: NOT DETECTED
AMPHET UR QL SCN: NOT DETECTED
BARBITURATES UR QL: NOT DETECTED
BUPRENORPHINE UR QL: NOT DETECTED
BZE UR QL: NOT DETECTED
CARBOXYTHC UR QL: PRESENT
CARISOPRODOL UR QL: NOT DETECTED
CLONAZEPAM UR QL: NOT DETECTED
CODEINE UR QL: NOT DETECTED
CREAT UR-MCNC: 162.4 MG/DL (ref 20–400)
DIAZEPAM UR QL: NOT DETECTED
ETHYL GLUCURONIDE UR QL: NOT DETECTED
FENTANYL UR QL: NOT DETECTED
HYDROCODONE UR QL: NOT DETECTED
HYDROMORPHONE UR QL: NOT DETECTED
LORAZEPAM UR QL: NOT DETECTED
MDA UR QL: NOT DETECTED
MDEA UR QL: NOT DETECTED
MDMA UR QL: NOT DETECTED
ME-PHENIDATE UR QL: NOT DETECTED
MEPERIDINE UR QL: NOT DETECTED
METHADONE UR QL: NOT DETECTED
METHAMPHET UR QL: NOT DETECTED
MIDAZOLAM UR QL SCN: NOT DETECTED
MORPHINE UR QL: NOT DETECTED
NORBUPRENORPHINE UR QL CFM: NOT DETECTED
NORDIAZEPAM UR QL: NOT DETECTED
NORFENTANYL UR QL: NOT DETECTED
NORHYDROCODONE UR QL CFM: NOT DETECTED
NOROXYCODONE UR QL CFM: NOT DETECTED
NOROXYMORPHONE UR QL SCN: NOT DETECTED
OXAZEPAM UR QL: NOT DETECTED
OXYCODONE UR QL: NOT DETECTED
OXYMORPHONE UR QL: NOT DETECTED
PATHOLOGY STUDY: NORMAL
PCP UR QL: NOT DETECTED
PHENTERMINE UR QL: NOT DETECTED
PROPOXYPH UR QL: NOT DETECTED
SERVICE CMNT-IMP: NORMAL
TAPENTADOL UR QL SCN: NOT DETECTED
TAPENTADOL UR QL SCN: NOT DETECTED
TEMAZEPAM UR QL: NOT DETECTED
TRAMADOL UR QL: NOT DETECTED
ZOLPIDEM UR QL: NOT DETECTED

## 2021-01-23 LAB — CARBOXYTHC UR-MCNC: >500 NG/ML

## 2021-01-26 DIAGNOSIS — F33.1 MODERATE EPISODE OF RECURRENT MAJOR DEPRESSIVE DISORDER (H): ICD-10-CM

## 2021-01-28 RX ORDER — PAROXETINE 30 MG/1
30 TABLET, FILM COATED ORAL EVERY MORNING
Qty: 30 TABLET | Refills: 0 | Status: SHIPPED | OUTPATIENT
Start: 2021-01-28 | End: 2021-03-15

## 2021-02-04 ENCOUNTER — COMMUNICATION - HEALTHEAST (OUTPATIENT)
Dept: PALLIATIVE MEDICINE | Facility: OTHER | Age: 71
End: 2021-02-04

## 2021-02-08 ENCOUNTER — OFFICE VISIT (OUTPATIENT)
Dept: FAMILY MEDICINE | Facility: CLINIC | Age: 71
End: 2021-02-08
Payer: COMMERCIAL

## 2021-02-08 VITALS
WEIGHT: 134.4 LBS | RESPIRATION RATE: 18 BRPM | TEMPERATURE: 98.1 F | HEART RATE: 80 BPM | OXYGEN SATURATION: 97 % | SYSTOLIC BLOOD PRESSURE: 123 MMHG | DIASTOLIC BLOOD PRESSURE: 78 MMHG | BODY MASS INDEX: 22.72 KG/M2

## 2021-02-08 DIAGNOSIS — I10 BENIGN ESSENTIAL HYPERTENSION: ICD-10-CM

## 2021-02-08 DIAGNOSIS — R19.7 DIARRHEA, UNSPECIFIED TYPE: Primary | ICD-10-CM

## 2021-02-08 DIAGNOSIS — R10.32 LLQ ABDOMINAL PAIN: ICD-10-CM

## 2021-02-08 LAB
% GRANULOCYTES: 50.5 %G (ref 40–75)
ALBUMIN SERPL-MCNC: 4.8 MG/DL (ref 3.3–4.9)
ALP SERPL-CCNC: 83.6 U/L (ref 40–150)
ALT SERPL-CCNC: <15 U/L (ref 0–45)
AST SERPL-CCNC: 17.7 U/L (ref 0–55)
BILIRUB SERPL-MCNC: 0.4 MG/DL (ref 0.2–1.3)
BUN SERPL-MCNC: 41.1 MG/DL (ref 7–21)
CALCIUM SERPL-MCNC: 10.1 MG/DL (ref 8.5–10.1)
CHLORIDE SERPLBLD-SCNC: 97.9 MMOL/L (ref 98–110)
CO2 SERPL-SCNC: 25.9 MMOL/L (ref 20–32)
CREAT SERPL-MCNC: 2 MG/DL (ref 0.7–1.3)
CRP SERPL-MCNC: 0.2 MG/DL (ref 0–0.8)
GFR SERPL CREATININE-BSD FRML MDRD: 35.3 ML/MIN/1.7 M2
GLUCOSE SERPL-MCNC: 92.3 MG'DL (ref 70–99)
GRANULOCYTES #: 6.2 K/UL (ref 1.6–8.3)
HCT VFR BLD AUTO: 43.5 % (ref 40–53)
HEMOGLOBIN: 13.5 G/DL (ref 13.3–17.7)
LYMPHOCYTES # BLD AUTO: 3.5 K/UL (ref 0.8–5.3)
LYMPHOCYTES NFR BLD AUTO: 28.3 %L (ref 20–48)
MCH RBC QN AUTO: 29.2 PG (ref 26.5–35)
MCHC RBC AUTO-ENTMCNC: 31 G/DL (ref 32–36)
MCV RBC AUTO: 94.1 FL (ref 78–100)
MID #: 2.6 K/UL (ref 0–2.2)
MID %: 21.2 %M (ref 0–20)
PLATELET # BLD AUTO: 244 K/UL (ref 150–450)
POTASSIUM SERPL-SCNC: 4.4 MMOL/L (ref 3.2–4.6)
PROT SERPL-MCNC: 7.8 G/DL (ref 6.8–8.8)
RBC # BLD AUTO: 4.6 M/UL (ref 4.4–5.9)
SODIUM SERPL-SCNC: 131.9 MMOL/L (ref 132–142)
WBC # BLD AUTO: 12.2 K/UL (ref 4–11)

## 2021-02-08 PROCEDURE — 80053 COMPREHEN METABOLIC PANEL: CPT | Performed by: STUDENT IN AN ORGANIZED HEALTH CARE EDUCATION/TRAINING PROGRAM

## 2021-02-08 PROCEDURE — 36415 COLL VENOUS BLD VENIPUNCTURE: CPT | Performed by: STUDENT IN AN ORGANIZED HEALTH CARE EDUCATION/TRAINING PROGRAM

## 2021-02-08 PROCEDURE — 99214 OFFICE O/P EST MOD 30 MIN: CPT | Mod: GC | Performed by: STUDENT IN AN ORGANIZED HEALTH CARE EDUCATION/TRAINING PROGRAM

## 2021-02-08 PROCEDURE — 85025 COMPLETE CBC W/AUTO DIFF WBC: CPT | Performed by: STUDENT IN AN ORGANIZED HEALTH CARE EDUCATION/TRAINING PROGRAM

## 2021-02-08 NOTE — PROGRESS NOTES
Preceptor attestation:  Vital signs reviewed: /78   Pulse 80   Temp 98.1  F (36.7  C) (Oral)   Resp 18   Wt 61 kg (134 lb 6.4 oz)   SpO2 97%   BMI 22.72 kg/m      Patient seen, evaluated, and discussed with the resident.  I have verified the content of the note, which accurately reflects my assessment of the patient and the plan of care.    Supervising physician: Eda Taveras MD  [facility]

## 2021-02-08 NOTE — TELEPHONE ENCOUNTER
Pt seen in clinic today 2/8/21 with Dr. Telles. Please see office visit encounter for more documentation.     ANGELICA Wallis

## 2021-02-08 NOTE — PROGRESS NOTES
ASSESSMENT AND PLAN     1. Diarrhea, unspecified type  Patient with 8 to 10 days of watery diarrhea, nonbloody.  I think this is most likely secondary to a flare of his IBD.  However, with this history would like to rule out other potential etiologies as well.  I do think he probably has some component of dehydration from his diarrhea as well, although his vital signs are normal today.  Encouraged him to push fluids aggressively.  We will collect CBC, CMP, CRP.  Will order stool studies: C. difficile, stool culture, stool ova/parasite.  If stool studies are negative and there are signs of inflammation consistent with IBD flare, would be reasonable to treat the patient with a course of steroids.  He is quite hesitant about following up with GI again in the future.  I did discuss with him that if this seems consistent with a flare of his Crohn's and we are not able to get good control of it easily, he may need to see GI again in the future, he agrees to this.  - Comprehensive Metabolic Panel ([place])  - CBC with Diff Plt ([facility])  - C-Reactive Protein ([facility])  - C. Difficile Toxin By PCR ([facility]); Future    2. LLQ abdominal pain  Patient complaining of left lower quadrant abdominal pain 12.  This is been going on for greater than 1 year.  On exam, I appreciate inguinal hernia, which is reducible.  Palpation of this hernia seems to reproduce the pain the patient is describing, thus we will have him follow-up with general surgery.  Must consider diverticulitis as well, as this could potentially cause both the pain and diarrhea, however I think this is less likely.  - GENERAL SURG ADULT REFERRAL; Future      I ended our visit today by discussing the patient's diagnoses and recommended treatment. Please refer to today's diagnoses and orders for further details. I briefly discussed the pathophysiology of these conditions and outlined their expected course. I discussed the warning symptoms and signs that indicate  an atypical course that would need urgent or emergent care. I also discussed self care strategies for symptom relief. Patient voiced understanding of plan of care and was in full agreement to proceed as discussed.    RTC once results of multiple studies are available or sooner if develops new or worsening symptoms.         SUBJECTIVE       Agustin Chaudhry is a 70 year old  male with a PMH significant for:     Patient Active Problem List   Diagnosis     Hypertension     Hepatitis C virus infection without hepatic coma     Moderate episode of recurrent major depressive disorder (H)     Posttraumatic stress disorder     Chronic neck pain     Crohn's disease in remission (H)     Degeneration of cervical intervertebral disc     Marginal corneal ulcer of left eye     Recurrent erosion of both corneas     Pseudophakia, right eye     Chronic pain syndrome       He presents with diarrhea.    Patient states that for about the last 8 to 10 days, he has had multiple episodes of diarrhea today.  Started with 1 large very dark-colored loose stool, since then stools have been yellow to green in color.  He is having about 3-5 loose stools per day.  He also states that initially they are quite foul-smelling, this is no longer the case.  Has not noticed any bright red blood in his stool, no dark stool since the first 1.  He maintains a normal appetite, has been eating and drinking like normal.  Does not need to go to the bathroom immediately after eating.  He has had chronic abdominal pain as below, states that pain in the abdomen has not changed at all since it started.  No nausea, no vomiting.  No fevers or chills, no sick exposure.  No recent travel.    In addition to this, the patient states that he has had pain in the left lower quadrant of his abdomen for over a year.  He states that it will feel like there is a ton of pressure, like his abdomen is pushing out in the left lower area, and he has to put both of his hands overhead  and press down to keep the pressure in and prevent pain from getting too bad.  This is unchanged since the diarrhea started.  He does sometimes feel like there is an area that is protruding the abdomen, but is difficult for him to appreciate grossly on visualization.  Pain does not radiate anywhere else.  He did once have a hernia on the right side that was surgically repaired, he states this feels somewhat similar to that.    PMH, Medications and Allergies were reviewed and updated as needed.        REVIEW OF SYSTEMS     CONSTITUTIONAL: NEGATIVE for fever, chills, change in weight  RESP: NEGATIVE for significant cough or SOB  CV: NEGATIVE for chest pain, palpitations or peripheral edema  GI: Diarrhea, LLQ pain  : NEGATIVE for frequency, dysuria, or hematuria  MUSCULOSKELETAL: NEGATIVE for significant arthralgias or myalgia  NEURO: NEGATIVE for weakness, dizziness or paresthesias  PSYCHIATRIC: NEGATIVE for changes in mood or affect        OBJECTIVE     Vitals:    02/08/21 1452   BP: 123/78   Pulse: 80   Resp: 18   Temp: 98.1  F (36.7  C)   TempSrc: Oral   SpO2: 97%   Weight: 61 kg (134 lb 6.4 oz)     Body mass index is 22.72 kg/m .    Constitutional: Awake, alert, cooperative, no apparent distress, and appears stated age.  Eyes: Lids and lashes normal, pupils equal, round and reactive to light, extra ocular muscles intact, sclera clear, conjunctiva normal.  ENT: oral pharynx with moist mucus membranes, tonsils without erythema or exudates  Lungs: No increased work of breathing, good air exchange, clear to auscultation bilaterally, no crackles or wheezing.  Cardiovascular: Regular rate and rhythm, normal S1 and S2  Abdomen: normal bowel sounds, soft, non-distended, mildly tender in the LLQ.  There is a very small left inguinal hernia present that is quite tender to palpation and is reducible.  Neurologic: Awake, alert, oriented to name, place and time.  Cranial nerves II-XII are grossly intact.   Neuropsychiatric:  Normal affect, mood, orientation, memory and insight.  Skin: No rashes, erythema, pallor, petechia or purpura.    Results for orders placed or performed in visit on 02/08/21 (from the past 24 hour(s))   Comprehensive Metabolic Panel ([place])   Result Value Ref Range    Albumin 4.8 3.3 - 4.9 mg/dL    Alkaline Phosphatase 83.6 40.0 - 150.0 U/L    ALT <15 0.0 - 45.0 U/L    AST 17.7 0.0 - 55.0 U/L    Bilirubin Total 0.4 0.2 - 1.3 mg/dL    Urea Nitrogen 41.1 (H) 7.0 - 21.0 mg/dL    Calcium 10.1 (H) 8.5 - 10.1 mg/dL    Chloride 97.9 (L) 98.0 - 110.0 mmol/L    Carbon Dioxide 25.9 20.0 - 32.0 mmol/L    Creatinine 2.0 (H) 0.7 - 1.3 mg/dL    Glucose 92.3 70.0 - 99.0 mg'dL    Potassium 4.4 3.2 - 4.6 mmol/L    Sodium 131.9 (L) 132.0 - 142.0 mmol/L    Protein Total 7.8 6.8 - 8.8 g/dL    GFR Estimate 35.3 (L) >60.0 mL/min/1.7 m2    GFR Estimate If Black 42.7 (L) >60.0 mL/min/1.7 m2   CBC with Diff Plt ([facility])   Result Value Ref Range    WBC 12.2 (H) 4.0 - 11.0 K/uL    Lymphocytes # 3.5 0.8 - 5.3 K/uL    % Lymphocytes 28.3 20.0 - 48.0 %L    Mid # 2.6 (H) 0.0 - 2.2 K/uL    Mid % 21.2 (H) 0.0 - 20.0 %M    GRANULOCYTES # 6.2 1.6 - 8.3 K/uL    % Granulocytes 50.5 40.0 - 75.0 %G    RBC 4.6 4.4 - 5.9 M/uL    Hemoglobin 13.5 13.3 - 17.7 g/dL    Hematocrit 43.5 40.0 - 53.0 %    MCV 94.1 78.0 - 100.0 fL    MCH 29.2 26.5 - 35.0    MCHC 31.0 (L) 32.0 - 36.0 g/dL    Platelets 244.0 150.0 - 450.0 K/uL       Patient staffed with Dr. Darcy Telles MD

## 2021-02-08 NOTE — TELEPHONE ENCOUNTER
[facility] phone call message- patient requesting a refill:    Full Medication Name: lisinopril     Dose: 20 MG     Pharmacy confirmed as       [facility], [address] [address]  Phone: [phone] Fax: [phone]  : Yes    Additional Comments: The pt is asking for the 20 mg pill not the 40 mg pill he is now on and will not take the 40 mg pill      OK to leave a message on voice mail? Yes    Primary language: English      needed? No    Call taken on February 8, 2021 at 1:34 PM by Seymour Stevenson

## 2021-02-09 ENCOUNTER — TRANSFERRED RECORDS (OUTPATIENT)
Dept: HEALTH INFORMATION MANAGEMENT | Facility: CLINIC | Age: 71
End: 2021-02-09

## 2021-02-09 ENCOUNTER — HOSPITAL ENCOUNTER (OUTPATIENT)
Dept: PALLIATIVE MEDICINE | Facility: OTHER | Age: 71
Discharge: HOME | End: 2021-02-09
Attending: NURSE PRACTITIONER

## 2021-02-09 ENCOUNTER — TELEPHONE (OUTPATIENT)
Dept: FAMILY MEDICINE | Facility: CLINIC | Age: 71
End: 2021-02-09

## 2021-02-09 DIAGNOSIS — M79.641 PAIN IN BOTH HANDS: ICD-10-CM

## 2021-02-09 DIAGNOSIS — F33.40 RECURRENT MAJOR DEPRESSIVE DISORDER, IN REMISSION (H): ICD-10-CM

## 2021-02-09 DIAGNOSIS — M79.642 PAIN IN BOTH HANDS: ICD-10-CM

## 2021-02-09 DIAGNOSIS — G89.29 CHRONIC INTRACTABLE PAIN: ICD-10-CM

## 2021-02-09 DIAGNOSIS — M47.812 FACET ARTHROPATHY, CERVICAL: ICD-10-CM

## 2021-02-09 DIAGNOSIS — I10 BENIGN ESSENTIAL HYPERTENSION: ICD-10-CM

## 2021-02-09 LAB
C. DIFFICILE TOXIN BY PCR: NEGATIVE
RIBOTYPE 027/NAP1/BI: NORMAL

## 2021-02-09 RX ORDER — LISINOPRIL 20 MG/1
20 TABLET ORAL DAILY
Qty: 90 TABLET | Refills: 0 | Status: SHIPPED | OUTPATIENT
Start: 2021-02-09 | End: 2021-02-11

## 2021-02-09 NOTE — TELEPHONE ENCOUNTER
[facility] phone call message- general phone call:    Reason for call: Pt is upset and would like to have medication cleared with [facility]. Pt was seen yesterday with  and medication lisinopril for 20 mg was not sent over correctly. Pt needs heart medication and has been dealing with issue since November     Return call needed: Yes    OK to leave a message on voice mail? Yes    Primary language: English      needed? No    Call taken on February 9, 2021 at 3:03 PM by Grisel Flores-Cardona

## 2021-02-10 LAB — O+P SPEC MICRO: NORMAL

## 2021-02-10 NOTE — PATIENT INSTRUCTIONS
02/10/21   GENERAL SURGERY REFERRAL    [facility]   Phone: [phone]  Fax: [phone]    Referral, demographics and office visit have been faxed to [phone]. They will contact patient to schedule.    Ann-Marie Astudillo CMA

## 2021-02-11 ENCOUNTER — AMBULATORY - HEALTHEAST (OUTPATIENT)
Dept: ADMINISTRATIVE | Facility: CLINIC | Age: 71
End: 2021-02-11

## 2021-02-11 ENCOUNTER — HOSPITAL ENCOUNTER (OUTPATIENT)
Dept: PALLIATIVE MEDICINE | Facility: OTHER | Age: 71
Discharge: HOME | End: 2021-02-11
Attending: SOCIAL WORKER

## 2021-02-11 DIAGNOSIS — F43.23 ADJUSTMENT DISORDER WITH MIXED ANXIETY AND DEPRESSED MOOD: ICD-10-CM

## 2021-02-11 DIAGNOSIS — K40.90 LEFT INGUINAL HERNIA: ICD-10-CM

## 2021-02-11 LAB
SHIGA TOXIN 1: NEGATIVE
SHIGA TOXIN 2: NEGATIVE

## 2021-02-11 RX ORDER — LISINOPRIL 20 MG/1
20 TABLET ORAL DAILY
Qty: 90 TABLET | Refills: 3 | Status: SHIPPED | OUTPATIENT
Start: 2021-02-11 | End: 2021-11-30

## 2021-02-12 ENCOUNTER — OFFICE VISIT - HEALTHEAST (OUTPATIENT)
Dept: SURGERY | Facility: CLINIC | Age: 71
End: 2021-02-12

## 2021-02-12 DIAGNOSIS — R10.12 LUQ ABDOMINAL PAIN: ICD-10-CM

## 2021-02-12 LAB — CULTURE: NORMAL

## 2021-02-15 ENCOUNTER — TELEPHONE (OUTPATIENT)
Dept: FAMILY MEDICINE | Facility: CLINIC | Age: 71
End: 2021-02-15

## 2021-02-15 NOTE — TELEPHONE ENCOUNTER
I called Agustin with lab results from his recent visit. Blood tests showed an ARA, likely from dehydration with his diarrhea. Stool tests all negative. I discussed this with Agustin. He states that his diarrhea has resolved at this time. I do think it is likely that he experienced a mild flare of his Crohns disease. I discussed with him that we could try treating this with a steroid taper (prednisone 40mg x1 week, then tapering by 10mg per week), however it would also be reasonable to continue to monitor given that his symptoms are essentially resolved. Agustin preferred to hold off on medication at this time. If symptoms return, he will schedule another appointment and we can start steroids. At his next in person visit, we will run another BMP to ensure that creatinine has normalized.    Shakir Telles MD  PGY3  [facility]  Pager: [phone]

## 2021-02-15 NOTE — TELEPHONE ENCOUNTER
[facility] phone call message- general phone call:    Reason for call:     Pt's states he got a call this morning from his doctor about his CT Scan. He is calling to inform Dr. Telles that his CT Scan was negative for a hernia.     Action desired:    Call back    Return call needed: Yes    OK to leave a message on voice mail? Yes    Advised patient to response may take up to 2 business days: Yes    Primary language: English      needed? No    Call taken on February 15, 2021 at 1:02 PM by Ann-Marie Astudillo CMA

## 2021-02-26 ENCOUNTER — HOSPITAL ENCOUNTER (OUTPATIENT)
Dept: PALLIATIVE MEDICINE | Facility: OTHER | Age: 71
Discharge: HOME | End: 2021-02-26
Attending: SOCIAL WORKER

## 2021-02-26 DIAGNOSIS — G89.4 CHRONIC PAIN SYNDROME: ICD-10-CM

## 2021-02-26 DIAGNOSIS — F43.23 ADJUSTMENT DISORDER WITH MIXED ANXIETY AND DEPRESSED MOOD: ICD-10-CM

## 2021-03-12 DIAGNOSIS — F33.1 MODERATE EPISODE OF RECURRENT MAJOR DEPRESSIVE DISORDER (H): ICD-10-CM

## 2021-03-15 RX ORDER — PAROXETINE 30 MG/1
30 TABLET, FILM COATED ORAL EVERY MORNING
Qty: 30 TABLET | Refills: 0 | Status: SHIPPED | OUTPATIENT
Start: 2021-03-15 | End: 2021-03-18

## 2021-03-16 ENCOUNTER — TELEPHONE (OUTPATIENT)
Dept: FAMILY MEDICINE | Facility: CLINIC | Age: 71
End: 2021-03-16

## 2021-03-16 DIAGNOSIS — F33.1 MODERATE EPISODE OF RECURRENT MAJOR DEPRESSIVE DISORDER (H): ICD-10-CM

## 2021-03-16 NOTE — TELEPHONE ENCOUNTER
[facility] phone call message- patient requesting a refill:    Full Medication Name: Paroxetine    Dose: 30mg    Pharmacy confirmed as   [facility], [address] [address]  Phone: [phone] Fax: [phone]  : Yes    Additional Comments: He has been out of his med for 6 day this happens every month he need this fill he is up set he need this filled asap and he need to talk to his doctor re refill because this is unacceptable . He need to be having refill automatically have refill 5 or 6 months!    OK to leave a message on voice mail? Yes    Primary language: English      needed? No    Call taken on March 16, 2021 at 12:09 PM by Princess Reeves

## 2021-03-17 ENCOUNTER — COMMUNICATION - HEALTHEAST (OUTPATIENT)
Dept: PALLIATIVE MEDICINE | Facility: OTHER | Age: 71
End: 2021-03-17

## 2021-03-17 DIAGNOSIS — G89.29 CHRONIC INTRACTABLE PAIN: ICD-10-CM

## 2021-03-17 DIAGNOSIS — F33.40 RECURRENT MAJOR DEPRESSIVE DISORDER, IN REMISSION (H): ICD-10-CM

## 2021-03-18 RX ORDER — PAROXETINE 30 MG/1
30 TABLET, FILM COATED ORAL EVERY MORNING
Qty: 30 TABLET | Refills: 5 | Status: SHIPPED | OUTPATIENT
Start: 2021-03-18 | End: 2021-10-06

## 2021-03-18 NOTE — TELEPHONE ENCOUNTER
Refill sent, thank you.    Shakir Telles MD  PGY3  [facility]  Pager: [phone]

## 2021-03-22 ENCOUNTER — HOSPITAL ENCOUNTER (OUTPATIENT)
Dept: PALLIATIVE MEDICINE | Facility: OTHER | Age: 71
Discharge: HOME | End: 2021-03-22
Attending: NURSE PRACTITIONER

## 2021-03-22 DIAGNOSIS — G89.29 CHRONIC INTRACTABLE PAIN: ICD-10-CM

## 2021-03-22 DIAGNOSIS — M79.642 PAIN IN BOTH HANDS: ICD-10-CM

## 2021-03-22 DIAGNOSIS — M79.641 PAIN IN BOTH HANDS: ICD-10-CM

## 2021-03-22 DIAGNOSIS — G89.4 CHRONIC PAIN SYNDROME: ICD-10-CM

## 2021-03-22 DIAGNOSIS — F33.40 RECURRENT MAJOR DEPRESSIVE DISORDER, IN REMISSION (H): ICD-10-CM

## 2021-05-27 VITALS — SYSTOLIC BLOOD PRESSURE: 110 MMHG | OXYGEN SATURATION: 97 % | DIASTOLIC BLOOD PRESSURE: 58 MMHG | HEART RATE: 68 BPM

## 2021-06-14 NOTE — PROGRESS NOTES
Agustin Chaudhry is a 70 y.o. male who is being evaluated via a billable video visit.      How would you like to obtain your AVS? Mail a copy.  If dropped from the video visit, the video invitation should be resent by: Text to cell phone: [phone]  Will anyone else be joining your video visit? No      Platform used for Video Visit: Doximity     Pain score 8 (averages at this number)  Constant   What does your pain like feel during a flare? Neck (throbbing and can't turn it either way much, if so, he will have a shooting shock feeling). Right arm (starts at ear and travels to tip of fingers that is a steady sharp pain). Bilateral hands and the right side of body are the worst pains. Bilateral knees flare up sometimes ache since an accident in 1970's.    Does the pain interfere with:  Work ----- yes   Walking ------ yes  Sleep ------- yes  Daily activities ------yes   Relationships -------yes  F=8    Karol Hanna CMA
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
## 2021-06-14 NOTE — PROGRESS NOTES
Urine Drug Test: 1/13/2021      Medication: none  Last dose of scheduled / tracked / medical cannabis / CBD: none    Witnessed Patch Location: n/a    Medication Current List    Current Outpatient Medications:      FLUAD QUAD 2020-21,65Y UP,,PF, 60 mcg (15 mcg x 4)/0.5 mL Syrg, PHARMACY ADMINISTERED, Disp: , Rfl:      hydrOXYzine pamoate (VISTARIL) 25 MG capsule, Take 25-50 mg by mouth., Disp: , Rfl:      lisinopriL (PRINIVIL,ZESTRIL) 20 MG tablet, TAKE 1 TABLET BY MOUTH EVERY DAY, Disp: , Rfl:      PARoxetine (PAXIL) 30 MG tablet, Take 30 mg by mouth every morning., Disp: , Rfl:     Personal belongings: Left outside the bathroom.    Comments: Patient had street marijuana at 8pm ib 1/12/2021

## 2021-06-14 NOTE — PATIENT INSTRUCTIONS - HE
Plan:   Plan and next steps - please review and refer back to the after visit summary - key pieces are highlighted    Follow up: 3-4 weeks (40minute appt with Sara MATHIS)      Education:  Today you saw Sara Solitario Nurse Practitioner.    The plan of care was reviewed, justification provided and questions answered.     I recognized you may have participated in treatments in the past. When troubles first start it is often an expectation you will be cured. Once we are looking at a long term experience, when cure is not likely, the achievable outcomes of treatment are adjust and full participation is expected.    Pain is complex and the treatment approaches are complex. Address of pain will focus on non-medication, non-opioid (non-narcotic) medication, noting occasionally opioids (narcotics) are included into a pain management program. There have been many changes over the last several years as it relates to opioid (narcotic) medication and these changes apply to everyone.     Health Maintenance: You will continue to see primary care for your general health care.    Behavioral Medicine: The body accepts sensory input. The input travels to the brain where the brain based on previous experience, education or culture comes up with a response. Since your brain is very powerful you have the opportunity to learn to adjust the response. Research has demonstrated over-and-over that people who are active participants with behavioral medicine have better management of their pain.     Once you have completed and provided the clinic with the intake paperwork you will be scheduled. Please note the first visit is an evaluation only.     I would like to see you work with one of the counselors related to PTSD    Integrative Approaches: Acupuncture is an option. Medical cannabis is of interest    Please review the following websites for information on locations, and also other frequently asked  "questions:  [email or web address]  [email or web address]  [email or web address] (look on the right column \"Featured Sites\" and choose Medical Cannabis tab      Medication: The [facility] does not assume responsibility for prescribing at the time of a consult. I did not provide you with any prescriptions. Continue with your current provider and they will continue your pain care in the fashion they deem is most appropriate.     We discussed patches are appealing - I think we may consider butrans patch. Fentanyl is to strong right now it would be unsafe    I think duloxetine (cymblata) is a good medication to consider to address the zingers    Diagnostics: UDT to be collected I understand you will be stopping the marijuana but it will be in the urine tomorrow  UDT  Patient required a random Urine Drug Testing, due to the need to comply with [place] Model Policy Guidelines and CDC Guideline for the use of any controlled substances. This is to ensure that patient is compliant with treatment, and monitor for risks such as diversion, abuse, or any other aberrant behaviors. Patient is either being considered for or taking a controlled substance. Unexpected findings will be discussed and treatment decision may be adjusted. Testing is being implemented across the board randomly w/o bias related to age, race, gender, socioeconomic status or Tenriism affiliation.     Records: Reviewed to assist with preparation for the office visit and are reflected throughout the note. Patient intake sheet also reviewed.      "

## 2021-06-15 NOTE — PROGRESS NOTES
History:  Agustin Chaudhry is a 70 y.o. male who was referred to general surgery (by Dr. Telles) for an inguinal hernia. He presents today with complaints of left-sided abdominal pain.  It has been present for about a year.  It occurs when he sneezes, coughs, or laughs.  He notes a protrusion and points to the left side of his abdomen lateral to his umbilicus and superior to his ASIS.  He does not notice any protrusion when he relaxes.  It is worse when he does heavy lifting.  It is painful when there is this protrusion.  He notes that he has had a hernia in the past.  It was located down in the right groin.  This one is different than that hernia.    As a side note, he notes that his Crohn's is flaring up.  For the last week he has had diarrhea and is losing some weight.  He does not follow with a gastroenterologist.  He has never been medically treated for his Crohn's except for with intermittent prednisone.  His last colonoscopy was about 5 years ago.    Allergies:  Patient has no known allergies.    Past Medical History:  Anxiety  Depression  History of hepatitis C  Crohn's disease    Past Surgical History:  4 eye surgeries  4 hand surgeries  Laparoscopic right inguinal hernia repair    Medications:     DULoxetine (CYMBALTA) 20 MG capsule, Take 1 capsule (20 mg total) by mouth daily for 14 days, THEN 2 capsules (40 mg total) daily for 14 days., Disp: 42 capsule, Rfl: 0     FLUAD QUAD 2020-21,65Y UP,,PF, 60 mcg (15 mcg x 4)/0.5 mL Syrg, PHARMACY ADMINISTERED, Disp: , Rfl:      hydrOXYzine pamoate (VISTARIL) 25 MG capsule, Take 25-50 mg by mouth., Disp: , Rfl:      lisinopriL (PRINIVIL,ZESTRIL) 20 MG tablet, TAKE 1 TABLET BY MOUTH EVERY DAY, Disp: , Rfl:      PARoxetine (PAXIL) 30 MG tablet, Take 30 mg by mouth every morning., Disp: , Rfl:     Family History:  No known family history of bleeding, clotting, or anesthesia problems    Social History:   reports that he has never smoked. He has never used smokeless  tobacco. He reports previous alcohol use. He reports current drug use. Drug: Marijuana.    Review of Systems:   General: No complaints or constitutional symptoms  Skin: No complaints or symptoms   Hematologic/Lymphatic: No symptoms or complaints  Psychiatric: No symptoms or complaints  Endocrine: No excessive fatigue, no hypermetabolic symptoms reported  Respiratory: No cough, shortness of breath, or wheezing  Cardiovascular: No chest pain or dyspnea on exertion  Gastrointestinal: As per HPI  Musculoskeletal: No recent injuries reported  Neurological: No focal neurologic defects reported.      Exam:  /58   Pulse 68   SpO2 97%   There is no height or weight on file to calculate BMI.  General: Alert, cooperative, appears stated age   Skin: Skin color, texture, turgor normal, no rashes or lesions   Lymphatic: No obvious adenopathy, no swelling   Eyes: No scleral icterus, pupils equal  HENT: No traumatic injury to the head or face, no gross abnormalities  Lungs: Normal respiratory effort, breath sounds equal bilaterally  Heart: Regular rate and rhythm  Abdomen: Soft, nondistended, nontender to palpation.  Small infraumbilical scar from prior laparoscopic surgery.  No appreciable inguinal hernia bilaterally with Valsalva maneuver.  The location of the patient's pain is lateral to the umbilicus within the semilunar line.  There is no appreciable protrusion or hernia defect when the patient is standing or laying down.  Musculoskeletal: No obvious swelling  Neurologic: Grossly intact    Assessment/Plan:   Agustin Chaudhry is a 70 y.o. male with pinpoint pain lateral to the umbilicus.  Although I do not appreciate any hernia or hernia defect, there is a possibility that he has a spigelian hernia in this location.  Therefore, a CT of the abdomen pelvis has been ordered.  We will obtain imaging and I will call him with the results.  If there is a hernia, then we will discuss hernia repair since this area is quite  symptomatic for him.      Mary Garcia DO  General Surgeon  [facility]  [facility] - [address]  [place] [address]  Office: [phone]  Employed by - [facility]

## 2021-06-15 NOTE — PROGRESS NOTES
"Neuropsychology Consult    Patient arrived and when the nature of the evaluation was explained to him, he seemed somewhat reluctant to participate, but after some discussion and questions were answered, he agreed to move forward.  However, 10 minutes into the interview, he stated that he was uncomfortable and did not wish to continue.  At this point we have been discussing his regular marijuana use.  I shared with him that if he had used marijuana recently, which he had, reportedly a couple hours ago, that we would not be able to complete testing today.  However, I would be willing to continue the evaluation at a later time.  He went on to say that again he was uncomfortable with this evaluation and did not want to talk to a \"shrink,\" and he did not feel like he needed to be here and did not wish to return.  I shared with him that his participation is fully voluntary and that I am more than happy to conduct the evaluation at any time in the future if he changed his mind. He was pleasant and stated that he meant no disrespect, but that he did not need any help with his mental health.  I shared with him that my evaluation was intended to assess his memory not his mental health, which he then said he had a recent MRI scan which was normal, so he would \"take care of the rest [him]self.\"      Of note, he claimed that he had smoked a small amount of marijuana several hours before this appointment, but he did appear somewhat altered, with slowed speech and some slurring of words. He also wore sunglasses so I could not see his eyes.     I am more than happy to evaluate him in the future if he would like to come back to complete formal testing, but he would have to refrain completely from marijuana use on the day of testing and ideally the day before as well.     No billing for today's appointment.   "

## 2021-06-15 NOTE — PROGRESS NOTES
Agustin Chaudhry is a 70 y.o. male who is being evaluated via a billable video visit.      How would you like to obtain your AVS? MyChart.  If dropped from the video visit, the video invitation should be resent by: Text to cell phone: cell phone  Will anyone else be joining your video visit? No    Video Start Time: 1000    none    Subjective   Agustin Chaudhry is 70 y.o. and presents today for the following health issues   HPI Mental health visit      Review of Systems  NA      Objective    [unfilled]Vitals:  No vitals were obtained today due to virtual visit.    Physical Exam  NA      Video-Visit Details    Type of service:  Video Visit    Video End Time (time video stopped): 1050  Originating Location (pt. Location): Home    Distant Location (provider location):  [facility]     Platform used for Video Visit: [email or web address]    2/26/2021  Start time: 1000    Stop Time: 1050       Session Type: Patient is presenting for an Individual session.    Agustin Chaudhry is a 70 y.o. male is being seen today for    Chief Complaint   Patient presents with      Follow Up     Anxiety     Depression   .     Follow up in regards to ongoing symptom management of creat drop down.     New symptoms or complaints: None    Functional Impairment:   Personal: 4  Family: 4  Social: 4  Work: 4    Clinical assessment of mental status:   Agustin Chaudhry presented on time.   He was oriented x3, open and cooperative, and dressed appropriately for this session and weather. His memory was Normal cognitive functioning .  His speech was  Within normal.  Language was normal.  Concentration and focus is Within normal. Psychosis is not noted or reported. He reports his mood is Anxious.  Affect is congruent with speech and is Congruent w/content of speech.  Fund of knowledge is adequate. Insight is adequate for therapy.    Suicidal/Homicidal Ideation present:None reported     Patient's impression of their current status: Pt reports  chronic pain is very difficult to manage and impacts daily functioning.     Therapist impression of patients current state: This 70 y.o. White or  male presents with chronic pain, depression & anxiety.  Continue to discussed family hx, trauma experiences and impact on his life.  Processed struggles with children, siblings and the long struggle of taking care of his parents.  Discussed short-term and long-term goals.  Discussed symptoms of PTSD that can impact self-esteem and relationships. Discussed struggles with chronic pain over many years and how it impacts all areas of his life.      Assessment tools used today include: None today.   Nicki Disability Scale Pain, Nicki Disability Scale Anxiety, Nicki Disability Scale Depression, WANG-7, PHQ-9, CAGE-AID, Mood Disorder Questionnaire, PANSI, PCL-C, PTCI-36 and can be found documented in Doc Flowsheets.       Type of psychotherapeutic technique provided: Insight oriented, Client centered, Solution-focused and CBT      Review of medication: Adherence as MD prescribed; Side effects noted; Efficacy      Review of patient health/health concerns: None      Progress toward short term goals:began discussing short-term goals    Review of long term goals: Began discussing long-term goals    Diagnosis:   1. Adjustment disorder with mixed anxiety and depressed mood    2. Chronic pain syndrome    no change    Plan and Follow-up:Patient plans to continue taking the medication as prescribed. Patient plans to follow up with therapy  a week/two weeks.     Discharge Criteria/Planning: Patient will continue with follow-up until therapy can be discontinued without return of signs and symptoms.    Performed and documented by 3/9/2021
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
## 2021-06-15 NOTE — PATIENT INSTRUCTIONS - HE
Plan:   Thank you for participating in the [facility] video visit - Here is the Plan of Care / NextSteps:     Contact [phone] to reserve a time. You need to follow up by: 4 weeks     Misha assistance with Video Visits [phone].    Communicating with us:  Please call Monday-Friday for problems or questions - leave a message and one of the clinical support staff (CSS) will help to get things figured out. The number is [phone].     You may also opt to communicate through SYLOB.     Primary Care: You are following with your primary care doctor about the Crohn's    Rehabilitation: Remain active     Specialists: I understand you will be seeing a surgeon for the hernia.     Behavioral Health: You will be seeing Alka Rabago    Integrative: Meditation - classroom meditation - for all ages Yoga with Sita - You Tube it is a 6 minute meditation (breathing exercises) - [email or web address].be/[email or web address]     Medication prescribed / to be continued:   Medication prescribed today:    Requested Prescriptions     Signed Prescriptions Disp Refills     DULoxetine (CYMBALTA) 20 MG capsule this is the medicine to help with the zingers 42 capsule 0     Sig: Take 1 capsule (20 mg total) by mouth daily for 14 days, THEN 2 capsules (40 mg total) daily for 14 days.

## 2021-06-15 NOTE — PROGRESS NOTES
Agustin Chaudhry is a 70 y.o. male last evaluated 1/12/21. Is being evaluated for chronic intractable pain bilaeral hands and neck.        Medical Record review and prep:  Start Time: 0801  End Time: 0805  TT: 4    Visit Time:  Start Time: 0921  End Time: 0949  TT: 28    Wrap up:  Start Time: 1142  End Time: 1147  TT: 5      Visit Details  Type of Service: video Visit  Originating Location for the patient: home   Distant Location:  [facility]  Platform used: localbacon.      Major issues:  1. Chronic intractable pain    2. Recurrent major depressive disorder, in remission (H)      Patient Active Problem List   Diagnosis     Hepatitis C virus infection without hepatic coma     Pain in both hands     Facet arthropathy, cervical     Chronic intractable pain     PTSD (post-traumatic stress disorder)     Anxiety     Recurrent major depressive disorder, in remission (H)       HPI:    Location of the pain: neck, right arm neck to fingers, shoulder and elbow, left forearm and hand, left lateral leg bilateral knees anterior and lateral  Timing: constant  Quality: sharp, tingling, burning, aching, tingling, shooting, deep, cramping, throbbing, swollen, grinding.  Aggravating factors: standing, sitting, walking, lying down, ra=eaching, lifting, squatting, weather change, eating, getting out of bed, any movement  Alleviating factors: massage, heat, meditation  DME: traction unit, heated gloves (wears at night)    Severity: Today: 8    Any New pain, injuries, falls: His Crohn's has been a bit of an issue. He indicates he is also struggling with a hernia.   Since last visit pain has: worsened due to the flare up of the Crohn's    Functional Symptoms: Pain interferes with:  Sleep: trouble pulling the sheets up over, trouble getting comfortable. He is sleeping about 4-6 hours. While caring for his parents he was sleeping in 2 hours intervals.He has continued to sleep in 2 hours intervals even since his  parents passed. He is able to get back to sleep  Walking:               Ambulation/Transfer: Pt is ambulatory. Transfers independently.   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 He does not have connection with his kids.                Animal Care: none. Hx of dogs              Family Care: poor conection              Volunteering: he use to sponsor for meth addiction and alcoholism.   ADL's:               Bathing: unable to shave              Grooming: he is not able to shave with ease              Dressing: trouble tieing his shoes, trouble zipping his jacket, very difficult to dress              Cooking: he cooks - he is leaning some from his roommate              Shopping: he will go with his roommate              Housekeeping: he is doing the chores. He has a lot of pain from the shoveling just the other day.              Toileting: independent              Get up and going takes about an hour to get his hands moving.   Concentration: no specific concerns  Transportation: with neck rotation he struggled, hands will get numb with hanging onto the steering wheel  Relationships/Social: he is not able to participate in his enjoyable activities  Services: he will hire some things out    Pain Plan of Care Review:   Medication:    Current Outpatient Medications:      FLUAD QUAD 2020-21,65Y UP,,PF, 60 mcg (15 mcg x 4)/0.5 mL Syrg, PHARMACY ADMINISTERED, Disp: , Rfl:      hydrOXYzine pamoate (VISTARIL) 25 MG capsule, Take 25-50 mg by mouth., Disp: , Rfl:      lisinopriL (PRINIVIL,ZESTRIL) 20 MG tablet, TAKE 1 TABLET BY MOUTH EVERY DAY, Disp: , Rfl:      PARoxetine (PAXIL) 30 MG tablet, Take 30 mg by mouth every morning., Disp: , Rfl:     Medical Cannabis: He indicates he has not been able to look into b/c of some other health related concerns. He  indicates when he stopped the cannabis he experienced a flare up of the Crohn's. Last used cannabis this morning with his abdominal pain he was not able to pause on use. He is feeling better with use. It is noted the medical cannabis is assistive but it is not offering full benefit and he needs more assistance with his pain.      Behavioral Medicine: visit scheduled 2/11/21 with Alka Rabago    Consultation/Specialist  He will be connecting with a surgeon about the hernia.      Objective:     Vitals:    02/09/21 0902   PainSc:   8       8(constant (see CMA note) F=8)     Constitutional:  Pleasant and cooperative male who presents alone today.   Psychiatric: Mood and affect are appropriate for the situation, setting and topic of discussion.  Patient does not appear sedated.  Integumentary:  Observed skin WNL.   HEENT: EOM's grossly intact.    Chest: Breathing is non-labored.   Neurological:  Alert and oriented in all spheres including: time, place, person and situation.    Diagnostics:   Lab:  Reviewed COVIDUDT note 1/13/21 Medication: none  Last dose of scheduled / tracked / medical cannabis / CBD: none     Per note 1/12/21  Marijuana:+ current finds this helpful - calms him a bit prefers over the hydroxyzine. He will smoke a joint throughout the whole day. Help with the pain.     UDT:  Detected marijuana metabolite     Quantify metabolite >500 - will monitor.      Imaging:  Imaging pulled forward today - not specifically reviewed      EXAM: XR HAND RT 3+ VIEWS  LOCATION:  [facility]  DATE/TIME: 5/17/2019 2:38 PM    INDICATION: Right index finger pain.  COMPARISON: 10/16/2015.  FINDINGS:   Partial absence of the trapezium is new and may be posttraumatic or postsurgical. Old healed fracture distal shaft of the metacarpal little finger. Mild degenerative arthritis at the MCP, PIP, and DIP joints of the index finger and the DIP joint little finger is unchanged. Grossly stable mild degenerative arthritis at the IP  joint of the thumb.     EXAM: XR HAND LT 3+ VIEWS  [facility]:  [facility]  DATE/TIME: 5/17/2019 2:37 PM    INDICATION: Index finger pain  COMPARISON: 10/16/2015    FINDINGS: Some increased joint space narrowing and osteophyte formation in the interphalangeal and metacarpal phalangeal joints of the index finger. No fracture or dislocation. No erosions or chondrocalcinosis. The bones appear demineralized. There has been resection of the trapezium.     [facility] RADIOLOGY    EXAM: MR CERVICAL SPINE WO IV CONT  LOCATION:  [facility]  DATE/TIME: 10/26/2018 7:09 AM    INDICATION: Cervical radiculopathy.  COMPARISON: 4/19/2018 cervical spine MRI.  CONCLUSION:  1.  Multilevel degenerative change in the cervical spine is as detailed above, with multilevel small posterior disc osteophyte complexes, multilevel facet arthropathy, and multilevel mild ligamentum flavum hypertrophy. This contributes to a moderate spinal canal stenosis at the C3-C4 and C5-C6 levels.    2.  Multilevel neural foraminal stenosis is most pronounced on the left at C3-C4, C4-C5, and C5-C6.         Assessment:   Agustin Chaudhry is a 70 y.o. male H/O HTN, Hep C, MDD, PTSD, anxiety, chronic neck pain. Crohn's disease, heart murmur, bilateral hand pain. He is evaluated today for chronic intractable pain hands, neck and arms, anxiety, MDD, PTSD.     He has been using marijuana find this assistive but is having several severe bout of 10/10 pain during the day. Goals is to significantly reduce the 10/10 pain. It sounds like marijuana was the only outlet for management he felt he had. Is is interested in the medical cannabis program is willing to stop street marijuana we can track via UDT and the pt is open to this. He did make an effort to stop the cannabis and his Crohn's flared up and he returned to use.      He has been working with paxil appears he had this many years ago and felt this was safe to work with again. I started duloxetine as I  "think this may offer more benefit for the \"zingers\" and can also support mood.     He is scheduled with counseling services given the PTSD and I am hopeful EMDR may be made available. Further we need to look at the cannabis.     Consider address of the cervical spine at a later date while discussed this was not the patient priority. He was not feeling open to injections historically they were very painful.         *Universal Precautions:   UDT/Swab-  1/13/2021   Consent- if prescribing opioids  Agreement- if prescribing opioids  Pharmacy- as documented   Count- n/a  Psychological evaluation - information provided  Pharmacogenetic testing- n/a  MME-   MTM - consider  Naloxone safety:     Previously tried medications: H=Helpful. NH=Not Helpful. U=Unsure. (n/a)=Not applicable  Acetaminophen: (nh)  NSAIDs:              Ibuprofen: (nh)               Naproxen (nh)               Celecobix (n/a)              Diclofenac (na)  Gabapentinoids:               Gabapentin: (nh)              Pregabalin: (na)  Antidepressants:               Amitriptyline: (na)              Nortriptyline: (na)              Duloxetine: (na)              Venlafaxine: (na)  Muscle Relaxants:               Tizanidine: (na)              Methocarbamol: (na)              Cyclobenzaprine: (na)              Metaxalone: (na)              Carisoprodol: (na)              Baclofen: (na)    Topicals:               Lidocaine: (h-short periods did not help with the zingers)              Diclofenac gel: (h-short period did not help the zingers)              Compounded pain creams: (no)              OTC/Herbal topical pain applications: (no)  Opioids:               Codeine: (na)              Hydrocodone: (na)              Oxycodone: (na)              Tramadol: (na)              Morphine: (na)              Hydromorphone: (na)              Methadone: (na)              Fentanyl: (na)              Buprenorphine: (na)              Tapentadol: (na)              Oxymorphone: " (na)  Supplements:               Ginseng: for energy      Plan:   Thank you for participating in the [facility] video visit - Here is the Plan of Care / NextSteps:     Contact [phone] to reserve a time. You need to follow up by: 4 weeks     Misha assistance with Video Visits [phone].    Communicating with us:  Please call Monday-Friday for problems or questions - leave a message and one of the clinical support staff (CSS) will help to get things figured out. The number is [phone].     You may also opt to communicate through Roadster.     Primary Care: You are following with your primary care doctor about the Crohn's    Rehabilitation: Remain active     Specialists: I understand you will be seeing a surgeon for the hernia.     Behavioral Health: You will be seeing Alka Rabago    Integrative: Meditation - classroom meditation - for all ages Yoga with Sita - You Tube it is a 6 minute meditation (breathing exercises) - [email or web address]     Medication prescribed / to be continued:   Medication prescribed today:    Requested Prescriptions     Signed Prescriptions Disp Refills     DULoxetine (CYMBALTA) 20 MG capsule this is the medicine to help with the zingers 42 capsule 0     Sig: Take 1 capsule (20 mg total) by mouth daily for 14 days, THEN 2 capsules (40 mg total) daily for 14 days.       Massiel Solitario APRN FNP-BC  [address] [facility] [address]   [phone]  [phone]      Massiel Solitario, CNP

## 2021-06-15 NOTE — PROGRESS NOTES
Agustin Chaudhry is a 70 y.o. male who is being evaluated via a billable video visit.       How would you like to obtain your AVS? Mail a copy.  If dropped from the video visit, the video invitation should be resent by: Text to cell phone: [phone] [facility]XIM[facility]  Will anyone else be joining your video visit? No     Pain score: 8  Constant   What does your pain like feel during a flare? Neck (throbbing and can't turn it either way much, if so, he will have a shooting shock feeling). Right arm (starts at ear and travels to tip of fingers that is a steady sharp pain). Bilateral hands and the right side of body are the worst pains. Bilateral knees flare up sometimes ache since an accident in 1970's.     Does the pain interfere with:  Work ----- yes   Walking ------ yes  Sleep ------- yes  Daily activities ------yes   Relationships -------yes  F=8

## 2021-06-16 NOTE — PROGRESS NOTES
Agustin Chaudhry is a 70 y.o. male last evaluated 2/9/21. Is being evaluated for chronic intractable pain bilateral hands and neck.      Medical Record review and prep:  Start Time: 0747  End Time:0750  TT: 3    Visit Time:  Start Time:1026  End Time: 1038  TT: 18      Visit Details  Type of Service: clinic Visit      Major issues:  1. Chronic pain syndrome    2. Chronic intractable pain    3. Recurrent major depressive disorder, in remission (H)    4. Pain in both hands      Patient Active Problem List   Diagnosis     Hepatitis C virus infection without hepatic coma     Pain in both hands     Facet arthropathy, cervical     Chronic intractable pain     PTSD (post-traumatic stress disorder)     Anxiety     Recurrent major depressive disorder, in remission (H)     Anterior basement membrane dystrophy     Central pterygium of right eye     Crohn's disease in remission (H)     DDD (degenerative disc disease), cervical     Hypertension     Keratitis     Marginal corneal ulcer of left eye     Moderate episode of recurrent major depressive disorder (H)     Pain in joints     Peripheral ulcerative keratitis     Pseudophakia, left eye     Recurrent erosion of both corneas     Trigger index finger of left hand       HPI:       Location of the pain: neck, right arm neck to fingers, shoulder and elbow, left forearm and hand, left lateral leg bilateral knees anterior and lateral  Timing: constant  Quality: sharp, tingling, burning, aching, tingling, shooting, deep, cramping, throbbing, swollen, grinding.  Aggravating factors: standing, sitting, walking, lying down, ra=eaching, lifting, squatting, weather change, eating, getting out of bed, any movement  Alleviating factors: massage, heat, meditation  DME: traction unit, heated gloves (wears at night)       Severity: Today: 8    Relationships/Social: Engaging in physical distancing. Reports feeling socially connected. Purchased a Angelfish and a camper looking forward to connecting  "with friends to fish.    Pain Plan of Care Review:   Medication:   Medication changes: titrating duloxetine - he indicates he put in for a refill for 3/12 the clinic received on 3/17 and set out pt reports he was off medication. Very agitated \"You doctors\" . \"This happened with my other doctor\". Showed the medical record see date 3/17 refill request. Discussed doing mychart and sending a direct message. Refused. Engage began to engage in more escalated conversation. Conversation was interrupted with the focus is on problem solving and a request to meet me half way. Left the visit.  Reported he would not be returning.       Current Outpatient Medications:      DULoxetine (CYMBALTA) 60 MG capsule, Take 1 capsule (60 mg total) by mouth daily., Disp: 30 capsule, Rfl: 0     FLUAD QUAD 2020-21,65Y UP,,PF, 60 mcg (15 mcg x 4)/0.5 mL Syrg, PHARMACY ADMINISTERED, Disp: , Rfl:      hydrOXYzine pamoate (VISTARIL) 25 MG capsule, Take 25-50 mg by mouth., Disp: , Rfl:      lisinopriL (PRINIVIL,ZESTRIL) 20 MG tablet, TAKE 1 TABLET BY MOUTH EVERY DAY, Disp: , Rfl:      PARoxetine (PAXIL) 30 MG tablet, Take 30 mg by mouth every morning., Disp: , Rfl:       Medical Cannabis: use of community acquired not assessed today    Behavioral Medicine: Met with counselor. Indicates follow up cxld due to issues with .     Objective:     Vitals:    03/22/21 1008   BP: 149/67   Pulse: 67   PainSc:   8   PainLoc: Arm       8   Vitals:    03/22/21 1008   BP: 149/67   Pulse: 67   PainSc:   8   PainLoc: Arm     Constitutional:  Pleasant and cooperative male who presents alone today.      Psychiatric: Mood and affect are frustraited.  Patient does not appear sedated.     Integumentary:  Observed skin WNL     HEENT: EOM's grossly intact.      Lymph: No cervical lymphadenopathy noted     Chest: Non-labored breathing.      Cardiovascular: radial pulses equal. No swelling or edema noted.      Hands: tenderness noted    Spine:   Cervical Spine: " ROM decreased. Tenderness -  Tension +  w/ palpation of the upper trapezius musculature.      Thoracic Spine:  good ROM of the bilaterally shoulder. Discomfort reported: no. Tension + Tenderness - w/ palpation of the thoracic paraspinals, infra/supra spinatus and the teres.     Lumbar Spine:  ROM good.        Neurological:   Alert and oriented in all spheres including: time, place, person and situation.  Motor/Sensory/Tone:  5/5 bilaterally UE. Hyperalgesia + hands    Durable Medical Equipment: mask      Diagnostics:   Lab:  Reviewed COVIDUDT note 1/13/21 Medication: none  Last dose of scheduled / tracked / medical cannabis / CBD: none     Per note 1/12/21  Marijuana:+ current finds this helpful - calms him a bit prefers over the hydroxyzine. He will smoke a joint throughout the whole day. Help with the pain.     UDT:  Detected marijuana metabolite     Quantify metabolite >500 - will monitor.      Imaging:  Imaging pulled forward today - not specifically reviewed      EXAM: XR HAND RT 3+ VIEWS  LOCATION:  [facility]  DATE/TIME: 5/17/2019 2:38 PM    INDICATION: Right index finger pain.  COMPARISON: 10/16/2015.  FINDINGS:   Partial absence of the trapezium is new and may be posttraumatic or postsurgical. Old healed fracture distal shaft of the metacarpal little finger. Mild degenerative arthritis at the MCP, PIP, and DIP joints of the index finger and the DIP joint little finger is unchanged. Grossly stable mild degenerative arthritis at the IP joint of the thumb.     EXAM: XR HAND LT 3+ VIEWS  LOCATION:  [facility]  DATE/TIME: 5/17/2019 2:37 PM    INDICATION: Index finger pain  COMPARISON: 10/16/2015    FINDINGS: Some increased joint space narrowing and osteophyte formation in the interphalangeal and metacarpal phalangeal joints of the index finger. No fracture or dislocation. No erosions or chondrocalcinosis. The bones appear demineralized. There has been resection of the trapezium.     [facility]  "RADIOLOGY    EXAM: MR CERVICAL SPINE WO IV CONT  LOCATION:  [facility]  DATE/TIME: 10/26/2018 7:09 AM    INDICATION: Cervical radiculopathy.  COMPARISON: 4/19/2018 cervical spine MRI.  CONCLUSION:  1.  Multilevel degenerative change in the cervical spine is as detailed above, with multilevel small posterior disc osteophyte complexes, multilevel facet arthropathy, and multilevel mild ligamentum flavum hypertrophy. This contributes to a moderate spinal canal stenosis at the C3-C4 and C5-C6 levels.    2.  Multilevel neural foraminal stenosis is most pronounced on the left at C3-C4, C4-C5, and C5-C6.          Assessment:   Agustin Chaudhry is a 70 y.o. male . Clinic evaluation for H/O HTN, Hep C, MDD, PTSD, anxiety, chronic neck pain. Crohn's disease, heart murmur, bilateral hand pain. He is evaluated today for chronic intractable pain hands, neck and arms, anxiety, MDD, PTSD.     He has been using marijuana find this assistive but is having several severe bout of 10/10 pain during the day. Goals is to significantly reduce the 10/10 pain. It sounds like marijuana was the only outlet for management he felt he had. Is is interested in the medical cannabis program is willing to stop street marijuana we can track via UDT and the pt is open to this. Previously noted he did make an effort to stop the cannabis and his Crohn's flared up and he returned to use.        I previously started duloxetine as I think this may offer more benefit for the \"zingers\" and can also support mood. He indicates he put In for a script on 3/12 the clinic did not receive the request until 3/17. This was shown to the pt. He wanted to focus on inaccuracies when drawn back to problem solving and facts as documented in the medical record he was unable was agitated and left. I am unclear if he will return. He did not appear to do well in clinic. He was much different with video visits.      He is scheduled with counseling services given the PTSD " and I am hopeful EMDR may be made available.     Consider address of the cervical spine at a later date while discussed this was not the patient priority. He was not feeling open to injections historically they were very painful.     He indicates he will not be following up.        *Universal Precautions:   UDT/Swab-  1/13/2021   Consent- if prescribing opioids  Agreement- if prescribing opioids  Pharmacy- as documented   Count- n/a  Psychological evaluation - information provided  Pharmacogenetic testing- n/a  MME-   MTM - consider  Naloxone safety:      Previously tried medications: H=Helpful. NH=Not Helpful. U=Unsure. (n/a)=Not applicable  Acetaminophen: (nh)  NSAIDs:              Ibuprofen: (nh)               Naproxen (nh)               Celecobix (n/a)              Diclofenac (na)  Gabapentinoids:               Gabapentin: (nh)              Pregabalin: (na)  Antidepressants:               Amitriptyline: (na)              Nortriptyline: (na)              Duloxetine: (na)              Venlafaxine: (na)  Muscle Relaxants:               Tizanidine: (na)              Methocarbamol: (na)              Cyclobenzaprine: (na)              Metaxalone: (na)              Carisoprodol: (na)              Baclofen: (na)    Topicals:               Lidocaine: (h-short periods did not help with the zingers)              Diclofenac gel: (h-short period did not help the zingers)              Compounded pain creams: (no)              OTC/Herbal topical pain applications: (no)  Opioids:               Codeine: (na)              Hydrocodone: (na)              Oxycodone: (na)              Tramadol: (na)              Morphine: (na)              Hydromorphone: (na)              Methadone: (na)              Fentanyl: (na)              Buprenorphine: (na)              Tapentadol: (na)              Oxymorphone: (na)  Supplements:               Ginseng: for energy      Plan:   Thank you for participating in the clinic visit - Here is the Plan  of Care / NextSteps:     Contact [phone] to reserve a time. You need to follow up by: as needed.     Communicating with us:  Please call Monday-Friday for problems or questions - leave a message and one of the clinical support staff (CSS) will help to get things figured out. The number is [phone].     You may also opt to communicate through Fabulyzer.     [facility]: [facility]    Behavioral Health: continue with Alka - I am concerned being in a clinic was upsetting for your. It would be great for you to talk with Alka about this.     Medication prescribed / to be continued: Today there was a struggle with acquiring the medication. There was some struggles with being able to work with me to figure out a solution. You were able to review the record for yourself and see the day the request came in it was sent to your pharmacy.         Massiel Solitario APRN FNP-BC  [address] [facility] [address]   [phone]  [phone]      Massiel Solitario, CNP

## 2021-06-16 NOTE — PATIENT INSTRUCTIONS - HE
Plan:   Thank you for participating in the clinic visit - Here is the Plan of Care / NextSteps:     Contact [phone] to reserve a time. You need to follow up by: as needed.     Communicating with us:  Please call Monday-Friday for problems or questions - leave a message and one of the clinical support staff (CSS) will help to get things figured out. The number is [phone].     You may also opt to communicate through happn.     Social: Enjoy the pontoon and [facility]    Behavioral Health: continue with Alka - I am concerned being in a clinic was upsetting for your. It would be great for you to talk with Alka about this.     Medication prescribed / to be continued: Today there was a struggle with acquiring the medication. There was some struggles with being able to work with me to figure out a solution. You were able to review the record for yourself and see the day the request came in it was sent to your pharmacy.

## 2021-06-16 NOTE — PROGRESS NOTES
Pt is being seen today by Sara Solitario CNP, for refill and f/u of multisite pain.     Pain score 8  Constant  What does your pain like feel during a flare? Throbbing, achy, burning  Does the pain interfere with:  Work ----- NA  Walking ------ yes  Sleep ------- yes  Daily activities ------yes  Relationships -------yes  F=7    UDT/CSA- 1/13/21

## 2021-06-16 NOTE — TELEPHONE ENCOUNTER
Completed  Requested Prescriptions     Signed Prescriptions Disp Refills     DULoxetine (CYMBALTA) 60 MG capsule 30 capsule 0     Sig: Take 1 capsule (60 mg total) by mouth daily.     Authorizing Provider: ZANE VENTURA     Increased the dose

## 2021-06-17 NOTE — TELEPHONE ENCOUNTER
Telephone Encounter by Cassandra Garcia CMA at 3/17/2021  2:45 PM     Author: Cassandra Garcia CMA Service: -- Author Type: Certified Medical Assistant    Filed: 3/17/2021  2:50 PM Encounter Date: 3/17/2021 Status: Signed    : Cassandra Garcia CMA (Certified Medical Assistant)       Medication being requested: Duloxetine 20MG   Last visit date: 2/9  Provider: ABRAHAM  Next visit date: 3/22  Provider: ABRAHAM  UDT: N/A  CSA N/A  Note:  Snip from visit on 2/9    Script cued - dates and quantity:   Requested Prescriptions     Pending Prescriptions Disp Refills   ? DULoxetine (CYMBALTA) 20 MG capsule 56 capsule 0     Sig: Take 2 capsules (40 mg total) by mouth daily.       Pharmacy cued: [facility] [place], [place]

## 2021-07-03 NOTE — ADDENDUM NOTE
Addendum Note by Karol Hanna CMA at 1/12/2021  1:00 PM     Author: Karol Hanna CMA Service: -- Author Type: Certified Medical Assistant    Filed: 1/19/2021 11:46 AM Date of Service: 1/12/2021  1:00 PM Status: Signed    : Karol Hanna CMA (Certified Medical Assistant)    Encounter addended by: Karol Hanna CMA on: 1/19/2021 11:46 AM      Actions taken: Visit diagnoses modified, Order list changed, Diagnosis   association updated

## 2021-07-03 NOTE — ADDENDUM NOTE
Addendum Note by Yudy Castaneda at 1/12/2021  1:00 PM     Author: Yudy Castaneda Service: -- Author Type: --    Filed: 1/14/2021 12:22 PM Date of Service: 1/12/2021  1:00 PM Status: Signed    : Yudy Castaneda    Encounter addended by: Yudy Castaneda on: 1/14/2021 12:22 PM      Actions taken: Charge Capture section accepted

## 2021-07-03 NOTE — ADDENDUM NOTE
Addendum Note by Massiel Bourne LPN at 1/13/2021 11:00 AM     Author: Massiel Bourne LPN Service: -- Author Type: Licensed Nurse    Filed: 1/13/2021  2:13 PM Date of Service: 1/13/2021 11:00 AM Status: Signed    : Massiel Bourne LPN (Licensed Nurse)    Encounter addended by: Massiel Bourne LPN on: 1/13/2021  2:13 PM      Actions taken: Clinical Note Signed

## 2021-07-04 NOTE — ADDENDUM NOTE
Addendum Note by Yudy Castaneda at 2/9/2021  9:00 AM     Author: Yudy Castaneda Service: -- Author Type: --    Filed: 2/11/2021  5:58 AM Date of Service: 2/9/2021  9:00 AM Status: Signed    : Yudy Castaneda    Encounter addended by: Yudy Castaneda on: 2/11/2021  5:58 AM      Actions taken: Charge Capture section accepted

## 2021-10-06 ENCOUNTER — OFFICE VISIT (OUTPATIENT)
Dept: FAMILY MEDICINE | Facility: CLINIC | Age: 71
End: 2021-10-06
Payer: COMMERCIAL

## 2021-10-06 VITALS
TEMPERATURE: 98 F | WEIGHT: 121 LBS | DIASTOLIC BLOOD PRESSURE: 84 MMHG | RESPIRATION RATE: 18 BRPM | HEART RATE: 65 BPM | SYSTOLIC BLOOD PRESSURE: 187 MMHG | BODY MASS INDEX: 20.46 KG/M2 | OXYGEN SATURATION: 98 %

## 2021-10-06 DIAGNOSIS — L85.3 DRY SKIN: ICD-10-CM

## 2021-10-06 DIAGNOSIS — I78.1 NEVUS, NON-NEOPLASTIC: ICD-10-CM

## 2021-10-06 DIAGNOSIS — I10 PRIMARY HYPERTENSION: ICD-10-CM

## 2021-10-06 DIAGNOSIS — M79.2 NERVE PAIN: Primary | ICD-10-CM

## 2021-10-06 DIAGNOSIS — Z23 NEED FOR PROPHYLACTIC VACCINATION AND INOCULATION AGAINST INFLUENZA: ICD-10-CM

## 2021-10-06 DIAGNOSIS — R35.0 INCREASED FREQUENCY OF URINATION: ICD-10-CM

## 2021-10-06 LAB
ALBUMIN UR-MCNC: NEGATIVE MG/DL
APPEARANCE UR: CLEAR
BACTERIA #/AREA URNS HPF: ABNORMAL /HPF
BILIRUB UR QL STRIP: NEGATIVE
COLOR UR AUTO: YELLOW
GLUCOSE UR STRIP-MCNC: NEGATIVE MG/DL
HGB UR QL STRIP: ABNORMAL
KETONES UR STRIP-MCNC: NEGATIVE MG/DL
LEUKOCYTE ESTERASE UR QL STRIP: NEGATIVE
NITRATE UR QL: NEGATIVE
PH UR STRIP: 5.5 [PH] (ref 5–8)
RBC #/AREA URNS AUTO: ABNORMAL /HPF
SP GR UR STRIP: 1.02 (ref 1–1.03)
SQUAMOUS #/AREA URNS AUTO: ABNORMAL /LPF
UROBILINOGEN UR STRIP-ACNC: 0.2 E.U./DL
WBC #/AREA URNS AUTO: ABNORMAL /HPF

## 2021-10-06 PROCEDURE — 99214 OFFICE O/P EST MOD 30 MIN: CPT | Mod: 25

## 2021-10-06 PROCEDURE — 90662 IIV NO PRSV INCREASED AG IM: CPT

## 2021-10-06 PROCEDURE — 90471 IMMUNIZATION ADMIN: CPT

## 2021-10-06 PROCEDURE — 81001 URINALYSIS AUTO W/SCOPE: CPT

## 2021-10-06 RX ORDER — TAMSULOSIN HYDROCHLORIDE 0.4 MG/1
0.4 CAPSULE ORAL DAILY
Qty: 60 CAPSULE | Refills: 0 | Status: SHIPPED | OUTPATIENT
Start: 2021-10-06 | End: 2021-11-30

## 2021-10-06 RX ORDER — DULOXETIN HYDROCHLORIDE 30 MG/1
30 CAPSULE, DELAYED RELEASE ORAL DAILY
Qty: 7 CAPSULE | Refills: 0 | Status: SHIPPED | OUTPATIENT
Start: 2021-10-06 | End: 2021-11-30

## 2021-10-06 RX ORDER — DULOXETIN HYDROCHLORIDE 60 MG/1
60 CAPSULE, DELAYED RELEASE ORAL DAILY
Qty: 30 CAPSULE | Refills: 0 | Status: SHIPPED | OUTPATIENT
Start: 2021-10-06 | End: 2021-11-11

## 2021-10-06 NOTE — PROGRESS NOTES
Preceptor attestation:  Vital signs reviewed: BP (!) 187/84 (BP Location: Left arm, Patient Position: Sitting, Cuff Size: Adult Regular)   Pulse 65   Temp 98  F (36.7  C) (Oral)   Resp 18   Wt 54.9 kg (121 lb)   SpO2 98%   BMI 20.46 kg/m      Patient seen, evaluated, and discussed with the resident.  I have verified the content of the note, which accurately reflects my assessment of the patient and the plan of care.    Supervising physician: Eda Taveras MD  [facility]

## 2021-10-06 NOTE — PROGRESS NOTES
[facility] Medicine Clinic Visit      Assessment & Plan   Agustin Chaudhry is a 71 year old male with the past medical history of bilateral trigger fingers with surgery, cervical spine cortisone injections, hypertension, depression, PTSD, GERD, and arthritis. He presents to [facility] for right arm pain of 2 years, increased frequency of urination, and mole check.    Nerve pain  Right arm pain starting about 2 years ago after cervical spine injections. 10/10 pain. Using diclofenac with some relief. Refuses physical therapy. Physical exam shows that many dermatomes are affected.  - DULoxetine (CYMBALTA) 30 MG capsule  Dispense: 7 capsule; Refill: 0  - DULoxetine (CYMBALTA) 60 MG capsule  Dispense: 30 capsule; Refill: 0  - Will follow up in about 1 month. Dosing may increase to 90-120mg if needed for pain.    Increased frequency of urination  Noticed especially during the night, going about 3-5 times a night.  - Urinalysis, Micro If  - Urine Microscopic Exam  - tamsulosin (FLOMAX) 0.4 MG capsule  Dispense: 60 capsule; Refill: 0  - Follow-up in about 1 month. Dosing may increase to 0.8 mg if needed for more improvement of symptoms.    Need for prophylactic vaccination and inoculation against influenza  - INFLUENZA, QUAD, HIGH DOSE, PF, 65YR + (FLUZONE HD)  - Is qualified for the COVID-19 booster after 11/18/2021    Primary Hypertension  Has not been taking his lisinopril medication.  - Restart taking lisinopril. Refills are still at the pharmacy.    Nevus, non-neoplastic  Normal nevi and seborrheic keratosis on left lateral lower neck.  - Patient educated to monitor all moles for growth and change of color.    Dry skin  Posterior and medial right upper arm.  - Continue to use ointments in areas.      Return in about 4 weeks (around 11/3/2021) for Follow up for blood pressure check and medication change check.      Patient was staffed with supervising physician, Dr. Eda Taveras .    Helena Pickett MD,  PGY1  [facility]      Subjective   Agustin Chaudhry is a 71 year old male with the past medical history of bilateral trigger fingers with surgery, cervical spine cortisone injections, hypertension, depression, PTSD, GERD, and arthritis. He presents to [facility] for right arm pain of 2 years, increased frequency of urination, and mole check.    HPI  Patient's chief complaint is right shoulder and arm pain which started about 2 years ago after he received his third cervical spine injection. He states it is getting worse and describes it as a continuous sharp pain. He describes point tenderness at midshaft in his triceps, and it radiates to the bicep. He scores it 10/10 with 10 being the most painful thing he has ever felt. The only thing that has helped is diclofenac cream, and it only lasts the first few minutes of application. Moving it causes pain. He is hoping to receive a cortisone shot. He refuses to participate in physical therapy because he states he has all the exercises and is looking for a quick fix.    Patient states that he has increased frequency of urination. He denies pain or blood in his urine. He states he is getting up 3-5 times a night to urinate and it is disrupting his sleep.    Patient has a family history of melanoma cancer in his family. He had a few bumps on the back of his right arm that have been there for a while. He has been itching them and picking at them. He also has three moles on the left base of his neck that some told him he should have looked at.    Patient states he has not been taking his hypertensive medications.    Patient desires flu vaccination and COVID booster if he is qualified.      Review of Systems   General: denies fever, chills, and fatigue  Skin: SEE HPI  Head: denies headache and trauma  Eyes: denies pain, blurry or double vision, dryness  Respiratory: denies cough, hemoptysis, wheezing, shortness of breath  Cardiovascular: denies chest pain,  palpitations, dyspnea, edema  Gastrointestinal: denies nausea, constipation, diarrhea, and change in bowel habits  Urinary: increased frequency and urgency; denies incontinence and dysuria  Genital: denies sores, discharge, and STDs  Musculoskeletal: endorses right tricep midshaft tenderness radiating into the bicep, decreased ROM of bilateral thumbs and other digits  Neurologic: denies dizziness and weakness; endorses tingling in bilateral fingers and different sensation in left and right lower upper extremities bilaterally  Psychiatric: history of depression and anxiety         Objective   BP (!) 187/84 (BP Location: Left arm, Patient Position: Sitting, Cuff Size: Adult Regular)   Pulse 65   Temp 98  F (36.7  C) (Oral)   Resp 18   Wt 54.9 kg (121 lb)   SpO2 98%   BMI 20.46 kg/m    Body mass index is 20.46 kg/m .    General appearance: alert, in no distress, cooperative, appears stated age  Head: normocephalic, without obvious abnormalities, atraumatic  Neck: 2 nevi with symmetrical coloring at left lower and lateral base of next. One small seborrheic keratosis  Eyes: conjunctivae/corneas clear  Ears: hearing grossly intact  Lung: clear to auscultation bilaterally  Heart: regular rate and rhythm  Extremities: warm, dry, atraumatic, no cyanosis, tattoo present  Pulses: 2+ and symmetric  Musculoskeletal: active ROM full of right shoulder and elbow, active ROM of internal rotation limited on the right compared to the left, similar strength between right and left elbow and shoulder  Skin: normal color, texture, and turgor; dry skin on right posterior medial shoulder which some scabbing  Neurologic: normal strength in bilateral arms; right upper extremity sensation decreased below the elbow in all dermatomes compared to the left  Psychologic: depressed mood     Results for orders placed or performed in visit on 10/06/21 (from the past 24 hour(s))   Urinalysis, Micro If   Result Value Ref Range    Color Urine  Yellow Colorless, Straw, Light Yellow, Yellow    Appearance Urine Clear Clear    Glucose Urine Negative Negative mg/dL    Bilirubin Urine Negative Negative    Ketones Urine Negative Negative mg/dL    Specific Gravity Urine 1.025 1.005 - 1.030    Blood Urine Trace (A) Negative    pH Urine 5.5 5.0 - 8.0    Protein Albumin Urine Negative Negative mg/dL    Urobilinogen Urine 0.2 0.2, 1.0 E.U./dL    Nitrite Urine Negative Negative    Leukocyte Esterase Urine Negative Negative   Urine Microscopic Exam   Result Value Ref Range    Bacteria Urine Few (A) None Seen /HPF    RBC Urine 0-2 0-2 /HPF /HPF    WBC Urine None Seen 0-5 /HPF /HPF    Squamous Epithelials Urine Few (A) None Seen /LPF       ----- Service Performed and Documented by Resident or Fellow ------

## 2021-10-06 NOTE — PATIENT INSTRUCTIONS
Thank you for seeing me today! Today we discussed:    - Your arm pain. Start taking Cymbalta: 30mg for 7 days and then 60mg for the next 2-4 weeks.  - Start Tamsulosin medication once a day for urination frequency.  - Restart taking your lisinopril for your high blood pressure.    - Follow-up with me again in about 4 weeks.    - You may receive your COVID booster after 11/18/2021!!

## 2021-10-26 ENCOUNTER — OFFICE VISIT (OUTPATIENT)
Dept: FAMILY MEDICINE | Facility: CLINIC | Age: 71
End: 2021-10-26
Payer: COMMERCIAL

## 2021-10-26 VITALS
DIASTOLIC BLOOD PRESSURE: 84 MMHG | WEIGHT: 119 LBS | TEMPERATURE: 98.1 F | BODY MASS INDEX: 20.12 KG/M2 | SYSTOLIC BLOOD PRESSURE: 154 MMHG | OXYGEN SATURATION: 98 % | HEART RATE: 60 BPM | RESPIRATION RATE: 16 BRPM

## 2021-10-26 DIAGNOSIS — G89.4 CHRONIC PAIN SYNDROME: Primary | ICD-10-CM

## 2021-10-26 DIAGNOSIS — I10 BENIGN ESSENTIAL HYPERTENSION: ICD-10-CM

## 2021-10-26 DIAGNOSIS — R35.0 URINARY FREQUENCY: ICD-10-CM

## 2021-10-26 DIAGNOSIS — Z23 NEED FOR VACCINATION: ICD-10-CM

## 2021-10-26 LAB
ALBUMIN SERPL-MCNC: 4.2 G/DL (ref 3.5–5)
ALBUMIN UR-MCNC: NEGATIVE MG/DL
ALP SERPL-CCNC: 75 U/L (ref 45–120)
ALT SERPL W P-5'-P-CCNC: <9 U/L (ref 0–45)
ANION GAP SERPL CALCULATED.3IONS-SCNC: 12 MMOL/L (ref 5–18)
APPEARANCE UR: CLEAR
AST SERPL W P-5'-P-CCNC: 13 U/L (ref 0–40)
BILIRUB SERPL-MCNC: 0.4 MG/DL (ref 0–1)
BILIRUB UR QL STRIP: NEGATIVE
BUN SERPL-MCNC: 28 MG/DL (ref 8–28)
CALCIUM SERPL-MCNC: 9.9 MG/DL (ref 8.5–10.5)
CHLORIDE BLD-SCNC: 104 MMOL/L (ref 98–107)
CO2 SERPL-SCNC: 22 MMOL/L (ref 22–31)
COLOR UR AUTO: YELLOW
CREAT SERPL-MCNC: 1.25 MG/DL (ref 0.7–1.3)
CREAT UR-MCNC: 113 MG/DL
GFR SERPL CREATININE-BSD FRML MDRD: 58 ML/MIN/1.73M2
GLUCOSE BLD-MCNC: 97 MG/DL (ref 70–125)
GLUCOSE UR STRIP-MCNC: NEGATIVE MG/DL
HGB UR QL STRIP: NEGATIVE
KETONES UR STRIP-MCNC: ABNORMAL MG/DL
LEUKOCYTE ESTERASE UR QL STRIP: NEGATIVE
MICROALBUMIN UR-MCNC: 1.04 MG/DL (ref 0–1.99)
MICROALBUMIN/CREAT UR: 9.2 MG/G CR
NITRATE UR QL: NEGATIVE
PH UR STRIP: 5.5 [PH] (ref 5–8)
POTASSIUM BLD-SCNC: 4.4 MMOL/L (ref 3.5–5)
PROT SERPL-MCNC: 7.6 G/DL (ref 6–8)
PSA SERPL-MCNC: 0.92 UG/L (ref 0–6.5)
SODIUM SERPL-SCNC: 138 MMOL/L (ref 136–145)
SP GR UR STRIP: >=1.03 (ref 1–1.03)
UROBILINOGEN UR STRIP-ACNC: 1 E.U./DL

## 2021-10-26 PROCEDURE — G0009 ADMIN PNEUMOCOCCAL VACCINE: HCPCS

## 2021-10-26 PROCEDURE — 36415 COLL VENOUS BLD VENIPUNCTURE: CPT

## 2021-10-26 PROCEDURE — 99214 OFFICE O/P EST MOD 30 MIN: CPT | Mod: 25

## 2021-10-26 PROCEDURE — 84153 ASSAY OF PSA TOTAL: CPT

## 2021-10-26 PROCEDURE — 90732 PPSV23 VACC 2 YRS+ SUBQ/IM: CPT

## 2021-10-26 PROCEDURE — 80053 COMPREHEN METABOLIC PANEL: CPT

## 2021-10-26 PROCEDURE — 81003 URINALYSIS AUTO W/O SCOPE: CPT

## 2021-10-26 PROCEDURE — 82043 UR ALBUMIN QUANTITATIVE: CPT

## 2021-10-26 NOTE — PROGRESS NOTES
Preceptor Attestation:    I discussed the patient with the resident and evaluated the patient in person. I have verified the content of the note, which accurately reflects my assessment of the patient and the plan of care.   Supervising Physician:  Hawk Tillman MD.

## 2021-10-26 NOTE — PROGRESS NOTES
[facility] Medicine Clinic Visit      Assessment & Plan   Agustin Chaudhry is a 71 year old female with the past medical history of hypertension, PTSD, depression, anxiety, arthritis, liver disease, and GERD. He present to [facility] for follow up BP check and change in medications.    Assessment & Plan     Chronic pain syndrome  Patient states pain in his right arm and shoulder has not improved with use of Cymbalta. He states he does not want to use marijuana, physical therapy, or gabapentin. He will continue to use his Cymbalta and Voltaren gel. He will also be referred to a pain clinic for pain management.    Need for vaccination  - Pneumococcal vaccine 23 valent PPSV23  (Pneumovax) [89354]    Benign essential hypertension  Patient has been taking 20 mg of lisinopril daily. After results of lab work are resulted, he may increase this dose to 40 mg a day if his kidney function allows.  - Comprehensive metabolic panel  - UA reflex to Microscopic  - Albumin Random Urine Quantitative with Creat Ratio    Urinary frequency  Patient has started tamsulosin 0.4 mg daily.  He states it is not giving him any relief at night.  He still goes to the bathroom about 4-6 times a night.  PSA will be measured today.  Tamsulosin may go up to 0.8 mg daily.  - PSA tumor marker  - diclofenac (VOLTAREN) 1 % topical gel  Dispense: 50 g; Refill: 3  - PSA tumor marker      Return in about 4 weeks (around 11/23/2021) for follow-up for labs.      Patient was staffed with supervising physician, Dr. Hawk Tillman.    Helena Pickett MD, PGY1  [facility]      Subjective   Agustin Chaudhry is a 71 year old female with the past medical history of hypertension, PTSD, depression, anxiety, arthritis, liver disease, and GERD. He present to [facility] for follow up BP check and change in medications.    HPI  Patient started taking Cymbalta 60 mg daily for nerve pain in his arms. He states that the pain is the same. His arm  feels as though it is tighter. He does not want to use marijuana. Has tried gabapentin without relief. He continues to refuse physical therapy. He has been to the pain clinic in [place]. Is interested in continuing Cymbalta, diclofenac gel, and referral to pain clinic.    Patient restarted taking his lisinopril 20 mg daily. Blood pressure today was 158/84. He denies chest pain, palpitations, light headedness, headaches, shortness of breath, and vision changes.    Patient began taking tamsulosin 0.4 mg daily for increased urination at night. He states he is still urinating 5-6 times a night.    Review of Systems   See HPI.  Denies upset stomach, nausea, vomiting, constipation, and diarrhea.        Objective   BP (!) 154/84 (BP Location: Left arm, Patient Position: Sitting, Cuff Size: Adult Regular)   Pulse 60   Temp 98.1  F (36.7  C) (Oral)   Resp 16   Wt 54 kg (119 lb)   SpO2 98%   BMI 20.12 kg/m    Body mass index is 20.12 kg/m .    General appearance: alert, cooperative, appears stated age  Ears: hearing grossly intact  Lung: clear to auscultation bilaterally, no wheezing, coughing, or use of accessory muscles  Heart: regular rate and rhythm, S1, S2 normal, no murmur, click, rub, or gallop  Skin: normal color, texture, and turgor. No rashes or lesions  Psychologic: down mood     Results for orders placed or performed in visit on 10/26/21   Albumin Random Urine Quantitative with Creat Ratio     Status: None   Result Value Ref Range    Microalbumin Urine mg/dL 1.04 0.00 - 1.99 mg/dL    Creatinine Urine mg/dL 113 mg/dL    Microalbumin Urine mg/g Cr 9.2 <=19.9 mg/g Cr    Narrative    Microalbumin, Random Urine   <2.0 mg/dL . . . . . . . . Normal   3.0-30.0 mg/dL . . . . . . Microalbuminuria   >30.0 mg/dL . . . . . .  . Clinical Proteinuria     Microalbumin/Creatinine Ratio, Random Urine   <20 mg/g . . . . .. . . . Normal    mg/g . . . . . . . Microalbuminuria   >300 mg/g . . . . . . . . Clinical  Proteinuria   UA reflex to Microscopic     Status: Abnormal   Result Value Ref Range    Color Urine Yellow Colorless, Straw, Light Yellow, Yellow    Appearance Urine Clear Clear    Glucose Urine Negative Negative mg/dL    Bilirubin Urine Negative Negative    Ketones Urine Trace (A) Negative mg/dL    Specific Gravity Urine >=1.030 1.005 - 1.030    Blood Urine Negative Negative    pH Urine 5.5 5.0 - 8.0    Protein Albumin Urine Negative Negative mg/dL    Urobilinogen Urine 1.0 0.2, 1.0 E.U./dL    Nitrite Urine Negative Negative    Leukocyte Esterase Urine Negative Negative    Narrative    Microscopic not indicated   PSA tumor marker     Status: Normal   Result Value Ref Range    PSA Tumor Marker 0.92 0.00 - 6.50 ug/L    Narrative    Assay Method is Abbott Prostate-Specific Antigen (PSA)  Standard-WHO 1st International (90:10)   Comprehensive metabolic panel     Status: Abnormal   Result Value Ref Range    Sodium 138 136 - 145 mmol/L    Potassium 4.4 3.5 - 5.0 mmol/L    Chloride 104 98 - 107 mmol/L    Carbon Dioxide (CO2) 22 22 - 31 mmol/L    Anion Gap 12 5 - 18 mmol/L    Urea Nitrogen 28 8 - 28 mg/dL    Creatinine 1.25 0.70 - 1.30 mg/dL    Calcium 9.9 8.5 - 10.5 mg/dL    Glucose 97 70 - 125 mg/dL    Alkaline Phosphatase 75 45 - 120 U/L    AST 13 0 - 40 U/L    ALT <9 0 - 45 U/L    Protein Total 7.6 6.0 - 8.0 g/dL    Albumin 4.2 3.5 - 5.0 g/dL    Bilirubin Total 0.4 0.0 - 1.0 mg/dL    GFR Estimate 58 (L) >60 mL/min/1.73m2       ----- Service Performed and Documented by Resident or Fellow ------

## 2021-10-26 NOTE — PATIENT INSTRUCTIONS
Thank you for seeing me today.    Today we discussed:  - Keep lisinopril 20 mg PO daily (same)  - Keep tamsulosin to 0.4 mg PO daily (same)  - Chronic pain clinic referral. They will call you to schedule an appointment. Continue Cymbalta and diclofenac gel.

## 2021-10-27 ENCOUNTER — TELEPHONE (OUTPATIENT)
Dept: FAMILY MEDICINE | Facility: CLINIC | Age: 71
End: 2021-10-27

## 2021-10-27 NOTE — TELEPHONE ENCOUNTER
Prior Authorization Retail Medication Request    Medication/Dose: diclofenac (VOLTAREN) 1 % topical gel  ICD code (if different than what is on RX):  Urinary frequency [R35.0]   Previously Tried and Failed:   Rationale:     Insurance Name: [facility]  Insurance ID:  [id number]    Pharmacy Information (if different than what is on RX)  Name: [facility], [place] [address] [place] [address] [id number] [facility]  Phone:  [phone]

## 2021-10-27 NOTE — TELEPHONE ENCOUNTER
Central Prior Authorization Team  Phone: [phone]    PA Initiation    Medication: diclofenac (VOLTAREN) 1 % topical gel  Insurance Company: [employer] [facility] [place] - Phone [phone] Fax [phone]  Pharmacy Filling the Rx: [address] - [address] [place] [address]  Filling Pharmacy Phone: [phone]  Filling Pharmacy Fax:    Start Date: 10/27/2021

## 2021-10-29 NOTE — RESULT ENCOUNTER NOTE
"Please call patient with results.    \"All labs are within normal limits. I would like you to increase your lisinopril to twice a day (40mg - can be taken at the same time) and have a follow-up appointment with me in 4 weeks to recheck kidney function for the increased medication. If you need refills I will send it to your pharmacy.\"    Helena Pickett MD, PGY1"

## 2021-11-02 DIAGNOSIS — I10 BENIGN ESSENTIAL HYPERTENSION: Primary | ICD-10-CM

## 2021-11-02 RX ORDER — LISINOPRIL 40 MG/1
40 TABLET ORAL DAILY
Qty: 90 TABLET | Refills: 3 | Status: SHIPPED | OUTPATIENT
Start: 2021-11-02 | End: 2022-06-01

## 2021-11-11 DIAGNOSIS — M79.2 NERVE PAIN: ICD-10-CM

## 2021-11-11 RX ORDER — DULOXETIN HYDROCHLORIDE 60 MG/1
CAPSULE, DELAYED RELEASE ORAL
Qty: 30 CAPSULE | Refills: 0 | Status: SHIPPED | OUTPATIENT
Start: 2021-11-11 | End: 2022-01-21

## 2021-11-15 ENCOUNTER — OFFICE VISIT (OUTPATIENT)
Dept: PALLIATIVE MEDICINE | Facility: CLINIC | Age: 71
End: 2021-11-15
Attending: FAMILY MEDICINE
Payer: COMMERCIAL

## 2021-11-15 VITALS — HEART RATE: 77 BPM | SYSTOLIC BLOOD PRESSURE: 150 MMHG | DIASTOLIC BLOOD PRESSURE: 73 MMHG

## 2021-11-15 DIAGNOSIS — M65.30 TRIGGER FINGER, ACQUIRED: ICD-10-CM

## 2021-11-15 DIAGNOSIS — M18.0 ARTHRITIS OF CARPOMETACARPAL (CMC) JOINT OF BOTH THUMBS: ICD-10-CM

## 2021-11-15 DIAGNOSIS — M54.12 CERVICAL RADICULOPATHY: ICD-10-CM

## 2021-11-15 DIAGNOSIS — G89.4 CHRONIC PAIN SYNDROME: ICD-10-CM

## 2021-11-15 DIAGNOSIS — M79.2 INTRACTABLE NEUROPATHIC PAIN OF UPPER EXTREMITY: Primary | ICD-10-CM

## 2021-11-15 PROCEDURE — 99205 OFFICE O/P NEW HI 60 MIN: CPT | Performed by: STUDENT IN AN ORGANIZED HEALTH CARE EDUCATION/TRAINING PROGRAM

## 2021-11-15 RX ORDER — GABAPENTIN 100 MG/1
CAPSULE ORAL
Qty: 90 CAPSULE | Refills: 1 | Status: SHIPPED | OUTPATIENT
Start: 2021-11-15 | End: 2022-12-29

## 2021-11-15 ASSESSMENT — PAIN SCALES - GENERAL: PAINLEVEL: SEVERE PAIN (7)

## 2021-11-15 NOTE — PATIENT INSTRUCTIONS
For your neck and hand pain we will:     - Likely do an injection. We need an MRI first  - Start medical cannabis  - Learn about pain neuroscience  - Use the topical Voltaren on your hands instead of your arm  - Start low dose gabapentin    Your next steps:  1. [phone] - please call to schedule appointment  2. Check your email for an email from the [place] Department of Health going over next steps for medical cannabis  3. Check out the following website for education on pain neuroscience and how you can use that to help your pain: https://www.[employer]/resources  I specifically recommend: https://www.[employer]/us/blog/pain-explained/2019/tale-two-nails  4.  gabapentin from your pharmacy.     Follow up:   - I will call you with the results of your MRI and to talk about an injection  - in clinic 1 month post-procedure  - Call with any problems with the gabapentin    Kaylyn Flowers MD  [facility]     ----------------------------------------------------------------  Clinic Number:  [phone]     Call with any questions about your care and for scheduling assistance.     Calls are returned Monday through Friday between 8 AM and 4:30 PM. We usually get back to you within 2 business days depending on the issue/request.    If we are prescribing your medications:    For opioid medication refills, call the clinic or send a [employer] message 7 days in advance.  Please include:    Name of requested medication    Name of the pharmacy.    For non-opioid medications, call your pharmacy directly to request a refill. Please allow 3-4 days to be processed.     Per [place] State Law:    All controlled substance prescriptions must be filled within 30 days of being written.      For those controlled substances allowing refills, pickup must occur within 30 days of last fill.      We believe regular attendance is key to your success in our  program!      Any time you are unable to keep your appointment we ask that you call us at least 24 hours in advance to cancel.This will allow us to offer the appointment time to another patient.     Multiple missed appointments may lead to dismissal from the clinic.

## 2021-11-15 NOTE — PROGRESS NOTES
[facility] Consultation    Date of visit: 11/15/2021    Reason for consultation:    Primary Care Provider is Helena Pickett.  Pain medications are being prescribed by NA.    Please see the [facility] health questionnaire which the patient completed and reviewed with me in detail.    Agustin Chaudhry is a 71 year old male with a history of chronic pain syndrome, PTSD, depression, Hep C, EtOH abuse (4 yrs recovering), depression who I was asked to see in consultation by Hawk Tillman  for evaluation of Patient is having right arm pain. Refuses PT and marijuana.     Chief Complaint:    Chief Complaint   Patient presents with     Pain         HISTORY OF PRESENT ILLNESS:  Agustin Chaudhry is a 71 year old male with history of arm pain and hand pain     Pain Information:  Has bilateral hand pain. All throughout fingers. Has triggering of all fingers. Corticosteroid injections into triggering fingers and bilateral CMC joints. Left ring finger is the worst. The remainder of the fingers get stuck but he can manually unstick. Zinggers down bilateral pointer fingers. Thumbs are good. Had surgery on bilateral pointer A1 pulleys. Numbness on the R thumb after surgery. Constant pain in hands. Use is exacerbating. Rest is alleviating.     Has R arm pain. Has always had neck pain with restricted range of motion left and R. Improved with PT. Has had cervical ESIs - 1st and 2nd didn't work. 3rd procedure resulted in reduced abilities with R arm and pain in R arm. Painful constantly. Pain is between deltoid and elbow. Difficulty with elbow flexion due to pain. Flexing his bicep causes 12/10 pain along inferior aspect of deltoid. Dull throbbing pain. Constant. Severe. Any arm range of motion is painful. Numbness of the R arm ulnar aspect from wrist to shoulder. Ring and little finger are numb and feel swollen.     Neck doesn't hurt but range of motion to left and right is limited.      Smokes marijuana. Used to put him to sleep at night. Now it takes the edge off his pain. He refuses the [place] medical cannabis program because he felt like a criminal at the appointment of the last pain clinic he went to.     Relapse with EtOH after father passed 2017. Sober again for 4 yrs     Current Pain Relevant Medications:    Topical voltaren - uses for R arm, hasn't tried for hands  Duloxetine -   No ibuprofen - doesn't like pills     Other Relevant Medications:    hydroxyzine    Anticoagulants:    no    Previous Pain Relevant Medications:   NSAIDS: celebrex - NH for finger pain;   Neuropathics: gabapentin for hands but not arm    Topicals: current topical diclofenac   Other medications not covered above: PO steroids [facility]    Other treatments have included:   PT: 2016 - really doesn't want to go back - waste of time and money    Psychologist:    Injections: chronic R neuropathic pain after cervical Transforaminal epidural steroid injection    Self-care:   Surgeries related to pain: trigger finger release x2, CMC joint surgery L and R    Past Medical History:  Past Medical History:   Diagnosis Date     Anxiety      Arthritis      Depression      Depression      GERD (gastroesophageal reflux disease)      Heart murmur      Hypertension      Hypertension 7/27/2018     Infantile spasms with broad thumbs (H)      Liver disease      PTSD (post-traumatic stress disorder)      Past Surgical History:  Past Surgical History:   Procedure Laterality Date     EYE SURGERY      x4 in both eyes     EYE SURGERY       HERNIA REPAIR  2014     HERNIA REPAIR       RELEASE TRIGGER FINGER Bilateral      thumb surgery      bilaterally     Medications:  Current Outpatient Medications   Medication Sig Dispense Refill     diclofenac (VOLTAREN) 1 % topical gel Apply 2 g topically 4 times daily as needed for moderate pain 50 g 3     DULoxetine (CYMBALTA) 30 MG capsule Take 1 capsule (30 mg) by mouth daily 7 capsule 0     DULoxetine  (CYMBALTA) 60 MG capsule TAKE ONE CAPSULE BY MOUTH EVERY DAY 30 capsule 0     hydrOXYzine (VISTARIL) 25 MG capsule Take 1-2 capsules (25-50 mg) by mouth 3 times daily as needed for anxiety (Patient not taking: Reported on 10/6/2021) 60 capsule 1     lisinopril (ZESTRIL) 20 MG tablet Take 1 tablet (20 mg) by mouth daily 90 tablet 3     lisinopril (ZESTRIL) 40 MG tablet Take 1 tablet (40 mg) by mouth daily 90 tablet 3     prazosin (MINIPRESS) 1 MG capsule Take 3 capsules (3 mg) by mouth At Bedtime (Patient not taking: Reported on 10/6/2021) 90 capsule 7     tamsulosin (FLOMAX) 0.4 MG capsule Take 1 capsule (0.4 mg) by mouth daily 60 capsule 0     Allergies:     Allergies   Allergen Reactions     Codeine Nausea and Vomiting       Social History:  Home situation: . Wife passed age 45. 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  HEENT: Head atraumatic, normocephalic. Eyes without conjunctival injection or jaundice.  Respiratory: No respiratory distress on RA   Skin: No rash, lesions, or petechiae of exposed skin.   Extremities: No clubbing, cyanosis, or edema.  Psychiatric/mental status: Alert, without lethargy or stupor. Speech fluent. Appropriate affect. Mood normal. Able to follow commands without difficulty.     Musculoskeletal exam:  Gait/Station/Posture: slow  Normal stance, arm swing, and stride; no antalgia or Trendelenburg  Normal bulk and tone.  Unremarkable spinal curvature. ASIS heights even.     Cervical spine:  Range of motion limited in R and L rotation, extension and flexion   Myofascial tenderness: no cervical paraspinal Tenderness to palpation   No focal spinous process tenderness.   Spurling's negative bilaterally.     Shoulder exam:   No shoulder girdle wasting or scapular dyskinesis. No palmar muscle wasting. No tenderness of bicipital groove, AC, GH, or SC joints. Shoulder range of motion on L within normal limits. on R, in abduction, external rotation are limited by severe pain. Floyd', Neers, and empty-can are negative bilaterally. R biceps, deltoid with hyperalgesia.     Carpal Tunnel tests:   Tinel's negative  Phalen's negative     Tenderness to palpation of bilateral CMC joints    Neurologic exam:  CN:  Cranial nerves 2-12 are grossly intact  Motor Strength:  5/5 symmetric UE strength,    Reflexes:      Other reflexes:    Mcdonald's negative bilaterally    Sensory:  (upper and lower extremities):   Light touch: normal in LUE   Allodynia: present diffusely in RUE   Hyperalgesia: present diffusely in RUE    DIRE Score for ongoing opioid management is calculated as follows:   Diagnosis = 2 pts    Intractability = 2 pts   Risk    Psych = 2 pts     Chem Hlth = 2 pts    Reliability = 1 pt    Social = 2 pts    (Psych + Chem hlth + Reliability + Social) = 7     Efficacy = 2 pts          DIRE Score = 13        7-13: likely NOT suitable candidate for long-term opioid analgesia       14-21: may be a suitable candidate for long-term opioid analgesia     Opioid Risk Tool (ORT) score is calculated as follows:   Family History of Substance Abuse:    Alcohol = 0 pt (no)   Illegal Drugs = 0 pt (no)   Prescription Drugs = 0 pt (no)     Personal History of Substance Abuse:   Alcohol = 3 pts (yes, male)   Illegal Drugs = 0 pt (no)   Prescription Drugs = 0 pt (no)   Age: 0 pt (age < 16; age > 45)     History of Pre-adolescent Sexual Abuse: 0 pt  "(no)     Psychological Disease: 2 pts (ADD, OCD, bipolar, schizophrenia)         ORT Score = 5        0-3: Low risk for opioid abuse       4-7: Moderate risk for opioid abuse       >/= 8: High risk for opioid abuse    Diagnostic tests:  MRI of cervical spine was completed on 10/26/218 at outside facility showing:  \"CONCLUSION:   1.  Multilevel degenerative change in the cervical spine is as detailed above, with multilevel small posterior disc osteophyte complexes, multilevel facet arthropathy, and multilevel mild ligamentum flavum hypertrophy. This contributes to a moderate spinal canal stenosis at the C3-C4 and C5-C6 levels.     2.  Multilevel neural foraminal stenosis is most pronounced on the left at C3-C4, C4-C5, and C5-C6  \"      ASSESSMENT:   Agustin Chaudhry is a 71 year old male who presents today with the complaints of:    1. Intractable neuropathic pain of upper extremity    2. Cervical radiculopathy    3. Chronic pain syndrome    4. Arthritis of carpometacarpal (CMC) joint of both thumbs    5. Trigger finger, acquired        Chronic bilateral hand pain 2/2 trigger fingers (s/p trigger finger release for bilateral pointer fingers), CMC arthritis s/p surgical management. Primary pain concern is chronic neuropathic pain of R UE after R cervical Transforaminal epidural steroid injection. Pain severely limiting life and would like to approach pain management from multidisciplinary perspective.     1. Mental Health - the patient's mental health concerns, specifically depression, affect his experience of pain and contribute to his clinically significant distress.    Plan:  The following recommendations were given to the patient. Diagnosis, treatment options, risks, benefits, and alternatives were discussed, and all questions were answered. The patient expressed understanding of the plan for management.     I am recommending a multidisciplinary treatment plan to help this patient better manage his pain.  This " includes:     Additional Workup:   1. - Diagnostic Studies: MRI cervical spine  Therapies:   2. - Clinical Health Psychologist to address issues of relaxation, behavioral change, coping style, and other factors important to improvement: not available at this time, given resources for [employer]  Medication Management:   3. - start medical cannabis  4. - start topical voltaren on hands  5. - start gabapentin 100 mg three times a day. Instructions given to contact me if not sufficiently beneficial  Further procedures recommended:   6. - Would like to consider cervical epidural steroid injection as appropriate pending cervical MRI    Follow up: in clinic 1 mo post-procedure    BILLING TIME DOCUMENTATION:   The total TIME spent on this patient on the date of the encounter/appointment was 60 minutes.      TOTAL TIME includes:   Time spent preparing to see the patient (reviewing records and tests) - 2 min  Time spent face to face (or over the phone) with the patient - 48 min  Time spent ordering tests, medications, procedures and referrals - 0 min  Time spent Referring and communicating with other healthcare professionals - 2 min  Time spent documenting clinical information in Epic - 8 min     Kaylyn Flowers MD  [facility]

## 2021-11-17 ENCOUNTER — TELEPHONE (OUTPATIENT)
Dept: FAMILY MEDICINE | Facility: CLINIC | Age: 71
End: 2021-11-17
Payer: COMMERCIAL

## 2021-11-17 NOTE — TELEPHONE ENCOUNTER
He was supposed to get his lisopril  doubled on the dose but when he picked up his medication it was not and now he is almost out

## 2021-11-23 ENCOUNTER — HOSPITAL ENCOUNTER (OUTPATIENT)
Dept: MRI IMAGING | Facility: HOSPITAL | Age: 71
Discharge: HOME | End: 2021-11-23
Attending: STUDENT IN AN ORGANIZED HEALTH CARE EDUCATION/TRAINING PROGRAM | Admitting: STUDENT IN AN ORGANIZED HEALTH CARE EDUCATION/TRAINING PROGRAM
Payer: COMMERCIAL

## 2021-11-23 DIAGNOSIS — M54.12 CERVICAL RADICULOPATHY: ICD-10-CM

## 2021-11-23 PROCEDURE — 72141 MRI NECK SPINE W/O DYE: CPT

## 2021-11-30 ENCOUNTER — OFFICE VISIT (OUTPATIENT)
Dept: FAMILY MEDICINE | Facility: CLINIC | Age: 71
End: 2021-11-30
Payer: COMMERCIAL

## 2021-11-30 VITALS
RESPIRATION RATE: 18 BRPM | SYSTOLIC BLOOD PRESSURE: 134 MMHG | DIASTOLIC BLOOD PRESSURE: 73 MMHG | HEART RATE: 67 BPM | TEMPERATURE: 98.1 F | BODY MASS INDEX: 21.57 KG/M2 | WEIGHT: 127.6 LBS | OXYGEN SATURATION: 97 %

## 2021-11-30 DIAGNOSIS — I10 PRIMARY HYPERTENSION: ICD-10-CM

## 2021-11-30 DIAGNOSIS — G89.4 CHRONIC PAIN SYNDROME: ICD-10-CM

## 2021-11-30 DIAGNOSIS — R35.0 INCREASED FREQUENCY OF URINATION: ICD-10-CM

## 2021-11-30 DIAGNOSIS — N18.1 CHRONIC KIDNEY DISEASE, STAGE 1: ICD-10-CM

## 2021-11-30 DIAGNOSIS — I10 PRIMARY HYPERTENSION: Primary | ICD-10-CM

## 2021-11-30 DIAGNOSIS — Z23 HIGH PRIORITY FOR 2019-NCOV VACCINE: Primary | ICD-10-CM

## 2021-11-30 LAB
ANION GAP SERPL CALCULATED.3IONS-SCNC: 12 MMOL/L (ref 3–14)
BUN SERPL-MCNC: 26 MG/DL (ref 7–30)
CALCIUM SERPL-MCNC: 9.4 MG/DL (ref 8.5–10.1)
CHLORIDE BLD-SCNC: 101 MMOL/L
CO2 SERPL-SCNC: 31 MMOL/L (ref 20–32)
CREAT SERPL-MCNC: 1.6 MG/DL
GFR SERPL CREATININE-BSD FRML MDRD: 43 ML/MIN/1.73M2
GLUCOSE BLD-MCNC: 102 MG/DL (ref 70–99)
POTASSIUM BLD-SCNC: 4.6 MMOL/L (ref 3.4–5.3)
SODIUM SERPL-SCNC: 144 MMOL/L (ref 133–144)

## 2021-11-30 PROCEDURE — 0004A COVID-19,PF,PFIZER (12+ YRS): CPT

## 2021-11-30 PROCEDURE — 91300 COVID-19,PF,PFIZER (12+ YRS): CPT

## 2021-11-30 PROCEDURE — 80048 BASIC METABOLIC PNL TOTAL CA: CPT

## 2021-11-30 PROCEDURE — 36415 COLL VENOUS BLD VENIPUNCTURE: CPT

## 2021-11-30 PROCEDURE — 99214 OFFICE O/P EST MOD 30 MIN: CPT | Mod: 25

## 2021-11-30 RX ORDER — TAMSULOSIN HYDROCHLORIDE 0.4 MG/1
0.4 CAPSULE ORAL DAILY
Qty: 90 CAPSULE | Refills: 3 | Status: SHIPPED | OUTPATIENT
Start: 2021-11-30 | End: 2022-12-05

## 2021-11-30 NOTE — PROGRESS NOTES
Preceptor Attestation:    I discussed the patient with the resident and evaluated the patient in person. I have verified the content of the note, which accurately reflects my assessment of the patient and the plan of care.   Supervising Physician:  Cleveland Lino MD.

## 2021-11-30 NOTE — PROGRESS NOTES
[facility] Visit      Assessment & Plan   Agustin Chaudhry is a 71 year old male with the past medical history of hypertension, anxiety, liver disease, depression, chronic pain, and alcohol dependence with sobriety for the last 4 years. He presents to [facility] for recheck of blood pressure and kidney check after increasing lisinopril to 40mg.    High priority for 2019-nCoV vaccine  Booster given today  - COVID-19,PF,PFIZER (12+ Yrs PURPLE LABEL)    Primary hypertension  Chronic kidney disease, stage 1  Blood pressure at 134/73 today. GFR at 43 but has fluctuated in the past between 35.3 and back to 58 this year. Creatinine fluctuating between 2 to 1.25.  - Basic metabolic panel  - Recheck in 6 months.    Chronic pain syndrome  Following with pain clinic.  - Clinical health psychologist  - Medical cannabis  - Topical voltaren gel on hands  - Gabapentin 100mg 3x daily  - Consider cervical epidural steroid injection pending MRI    Increased frequency of urination  Patient states his stream is strong. He feels as though he is urinating less times throughout the day. However, he states he has urgency which he now realizes was there before starting tamsulosin. He will further monitor symptoms with the use of tamsulosin. No changes to be made at this time. Prior PSA and urinalysis was unremarkable.  - tamsulosin (FLOMAX) 0.4 MG capsule  Dispense: 90 capsule; Refill: 3    Return in about 6 months (around 5/30/2022) for Follow-up for BP and annual phsycial exam.      Patient was staffed with supervising physician, Dr. Cleveland Lino.    Helena Pickett MD, PGY1  [facility]      Subjective   Agustin Chaudhry is a 71 year old male with the past medical history of hypertension, anxiety, liver disease, depression, chronic pain, and alcohol dependence with sobriety for the last 4 years. He presents to [facility] for recheck of blood pressure and kidney check after increasing  lisinopril to 40mg.      HPI  Patient largest complaint is pain in the right arm, hand, and upper back. Patient was seen by the pain clinic. Had MRI done and corticosteroid injections in bilateral finger joints. He states the pain still persists. They have started gabapentin 300 mg and medical marijuana. He states the gabapentin is not helping and has not helped in the past. He states he has a hard time sleeping throughout the night. Currently, he is taking the gabapentin in the morning and was told he could take them at night due to side effects of drowsiness. He will start using the voltaren gel on his hands. They are suggesting corticosteroid injections in the future.    Feels as though he is not urinating as much as he used to since the start of tamsulosin but has had urgency. He states that this was even before starting tamsulosin. He states he will pay more attention to it. Previous PSA was 0.92.      Review of Systems   General: denies fever, chills, and fatigue  Head: denies headache and trauma  Ears: denies ringing in ears, drainage, decreased hearing  Eyes: denies pain, blurry or double vision, dryness  Neck: denies lumps  Respiratory: denies cough, hemoptysis, wheezing, shortness of breath  Cardiovascular: denies chest pain, palpitations, dyspnea, edema  Gastrointestinal: denies nausea, constipation, diarrhea, and change in bowel habits  Urinary: denies dysuria, endorses urgency and decreased frequency from before  Vascular: endorses leg cramping  Musculoskeletal: Endorses bilateral hand pain in all fingers, pain in right arm, and pain in bilateral shoulder area        Objective   /73 (BP Location: Left arm, Patient Position: Sitting, Cuff Size: Adult Regular)   Pulse 67   Temp 98.1  F (36.7  C) (Oral)   Resp 18   Wt 57.9 kg (127 lb 9.6 oz)   SpO2 97%   BMI 21.57 kg/m    Body mass index is 21.57 kg/m .    General appearance: alert, in no distress, cooperative, appears stated age  Head:  normocephalic, without obvious abnormalities, atraumatic  Eyes: conjunctivae/corneas clear  Ears: hearing grossly intact  Lung: clear to auscultation bilaterally, no wheezing, coughing, or use of accessory muscles  Chest wall: no tenderness  Heart: regular rate and rhythm, S1, S2 normal, no murmur, click, rub, or gallop  Extremities: warm, dry, atraumatic, no cyanosis, no edema  Skin: normal color, texture, and turgor  Psychologic: normal mood     Results for orders placed or performed in visit on 11/30/21   Basic metabolic panel     Status: Abnormal   Result Value Ref Range    Sodium 144 133 - 144 mmol/L    Potassium 4.6 3.4 - 5.3 mmol/L    Chloride 101 mmol/L    Carbon Dioxide (CO2) 31 20 - 32 mmol/L    Anion Gap 12 3 - 14 mmol/L    Urea Nitrogen 26 7 - 30 mg/dL    Creatinine 1.60 mg/dL    Calcium 9.4 8.5 - 10.1 mg/dL    Glucose 102 (H) 70 - 99 mg/dL    GFR Estimate 43 (L) >60 mL/min/1.73m2       ----- Service Performed and Documented by Resident or Fellow ------

## 2021-11-30 NOTE — PATIENT INSTRUCTIONS
Thank you for seeing me today!    Your blood pressure is within a good range. Please continue the 40mg lisinopril daily.    Please call the clinic with any questions.

## 2021-12-01 ENCOUNTER — TELEPHONE (OUTPATIENT)
Dept: PALLIATIVE MEDICINE | Facility: CLINIC | Age: 71
End: 2021-12-01
Payer: COMMERCIAL

## 2021-12-01 DIAGNOSIS — Z20.822 ENCOUNTER FOR LABORATORY TESTING FOR COVID-19 VIRUS: ICD-10-CM

## 2021-12-01 DIAGNOSIS — M54.12 CERVICAL RADICULOPATHY: Primary | ICD-10-CM

## 2021-12-01 ASSESSMENT — PATIENT HEALTH QUESTIONNAIRE - PHQ9: SUM OF ALL RESPONSES TO PHQ QUESTIONS 1-9: 6

## 2021-12-01 NOTE — TELEPHONE ENCOUNTER
Pt calling back.  Dr. Flowers's results were relayed in full. He would like to proceed to the cervical epidural steroid injection.     Cervical epidural steroid injection order placed.    ALFREDO Harmon-BSN  [facility]

## 2021-12-01 NOTE — TELEPHONE ENCOUNTER
Writer attempted to call pt, No answer.  LVM for Pt to call writer back at [phone].    Giovanny Cortes, ALFREDO  Patient Care Supervisor   [facility]

## 2021-12-01 NOTE — TELEPHONE ENCOUNTER
----- Message from Kaylyn Flowers MD sent at 11/29/2021 11:36 AM CST -----  Please let Pt know his MRI demonstrates right-sided disc bulges at C5/6 and C6/7 that could be contributing to his pain.    At the time of our visit he wished to pursue cervical epidural steroid injection if appropriate. If he continues to be interested in a cervical epidural steroid injection it would be appropriate and I can place an order. Please remind to Pt that it would be a posterior approach for epidural steroid injection, not anterior approach as he had in the past.     Kaylyn Flowers MD  [facility]

## 2021-12-02 NOTE — TELEPHONE ENCOUNTER
Screening Questions for Radiology Injections:    Injection to be done at which interventional clinic site? [facility]    If [facility] location, tell patient that this procedure requires a COVID-19 lab test be done within 4 days of the procedure. Would you still like to move forward with scheduling the procedure?  YES:    If YES, let patient know that someone will call them to schedule the COVID-19 test and that they will only receive a call back if the result is positive. Route to nursing to enter order.     Instruct patient to arrive as directed prior to the scheduled appointment time:    [place] minutes before      Vee: 30 minutes before; if IV needed 1 hour before     Procedure ordered by Lionel    Procedure ordered? cervical epidural steroid injection      Transforaminal Cervical CECILY -  [place] does NOT have providers that do these- [facility] and [facility] do have providers that do    As a reminder, receiving steroids can decrease your body's ability to fight infection.   Would you still like to move forward with scheduling the injection?  Yes    What insurance would patient like us to bill for this procedure? BCBS Medicare      Worker's comp or MVA (motor vehicle accident) -Any injection DO NOT SCHEDULE and route to Kellie Quesada.      [employer] insurance - For SI joint injections, DO NOT SCHEDULE and route Kellie Quesada.       ALL BCBS, [employer] and [employer]-Route to Kellie for review DO NOT SCHEDULE      IF SCHEDULING IN [place] AND NEEDS A PA, IT IS OKAY TO SCHEDULE. [place] HANDLES THEIR OWN PA'S AFTER THE PATIENT IS SCHEDULED. PLEASE SCHEDULE AT LEAST 1 WEEK OUT SO A PA CAN BE OBTAINED.    Any chance of pregnancy? Not Applicable   If YES, do NOT schedule and route to RN pool    Is an  needed? No     Patient has a drive home? (mandatory) YES: INFORMED    Is patient taking any blood thinners (That is: Coumadin, Warfarin, Jantoven, Pradaxa Xarelto, Eliquis, Edoxaban,  Enoxaparin, Lovenox, Heparin, Arixtra, Fondaparinux, or Fragmin? OR Antiplatelet medication such as Plavix, Brilinta, or Effient? )? No   If hold needed, do NOT schedule, route to RN pool     Is patient taking any aspirin products (includes Excedrin and Fiorinal)? No     If more than 325mg/day, OK to schedule; Instruct pt to decrease to less than 325 mg for 7 days AND route to RN pool    For CERVICAL procedures, hold all aspirin products for 6 days.     Tell pt that if aspirin product is not held for 6 days, the procedure WILL BE cancelled.      Does the patient have a bleeding or clotting disorder? No     If YES, okay to schedule AND route to RN nurse pool    For any patients with platelet count <100, must be forwarded to provider    Any allergies to contrast dye, iodine, shellfish, or numbing and steroid medications? No    If YES, add allergy information to appointment notes AND route to the RN pool     If CECILY and Contrast Dye / Iodine Allergy? DO NOT SCHEDULE, route to RN pool    Allergies: Codeine     Is patient diabetic?  No  If YES, instruct them to bring their glucometer.    Does patient have an active infection or treated for one within the past week? No     Is patient currently taking any antibiotics?  No     For patients on chronic, preventative, or prophylactic antibiotics, procedures may be scheduled.     For patients on antibiotics for active or recent infection:antibiotic course must have been completed for 4 days    Is patient currently taking any steroid medications? (i.e. Prednisone, Medrol)  No     For patients on steroid medications, course must have been completed for 4 days    Is patient actively being treated for cancer or immunocompromised? No  If YES, do NOT schedule and route to RN pool     Are you able to get on and off an exam table with minimal or no assistance? Yes  If NO, do NOT schedule and route to RN pool    Are you able to roll over and lay on your stomach with minimal or no  assistance? Yes  If NO, do NOT schedule and route to RN pool     Has the patient had a flu shot or any other vaccinations within 7 days before or after the procedure.  No     Have you recently had a COVID vaccine or have plans to get it in the near future? YES  - booster 11/30    If yes, explain that for the vaccine to work best they need to:       wait 1 week before and 1 week after getting Vaccine #1    wait 1 week before and 2 weeks after getting Vaccine #2    wait 1 week before and 2 weeks after getting Vaccine BOOSTER    If patient has concerns about the timing, send to RN pool     Does patient have an MRI/CT?  YES: 2021  Check Procedure Scheduling Grid to see if required.      Was the MRI done within the last 3 years?  Yes    If yes, where was the MRI done i.e.[facility], [facility], [facility] [facility], [facility] etc? [facility]      If no, do not schedule and route to RN pool    If MRI was not done at [facility] [facility], [facility] or [facility] do NOT schedule and route to RN pool.      If pt has an imaging disc, the injection MAY be scheduled but pt has to bring disc to appt.     If they show up without the disc the injection cannot be done    Procedure Specific Instructions:      If celiac plexus block, informed patient NPO for 6 hours and that it is okay to take medications with sips of water, especially blood pressure medications  Not Applicable         If this is for a cervical procedure, informed patient that aspirin needs to be held for 6 days.   Not Applicable      If IV needed:    Do not schedule procedures requiring IV placement in the first appointment of the day or first appointment after lunch. Do NOT schedule at 0745, 0815 or 1245.     Instructed pt to arrive 30 minutes early for IV start if required. (Check Procedure Scheduling Grid)  YES:     Reminders:      If you are started on any steroids or antibiotics between now and your appointment, you must contact us because the procedure may need to be cancelled.   Yes      For all procedures except radiofrequency ablations (RFAs) and spinal cord stimulator (SCS) trials, informed patient:    IV sedation is not provided for this procedure.  If you feel that an oral anti-anxiety medication is needed, you can discuss this further with your referring provider or primary care provider.  The Pain Clinic provider will discuss specifics of what the procedure includes at your appointment.  Most procedures last 10-20 minutes.  We use numbing medications to help with any discomfort during the procedure.  Not Applicable      For patients 85 or older we recommend having an adult stay w/ them for the remainder of the day.       Does the patient have any questions?  NO  Dayanna Mccormick  [facility]

## 2021-12-02 NOTE — TELEPHONE ENCOUNTER
Case Number: [id number]  Review Date: 12/2/2021 4:23:21 PM  Expiration Date: 5/31/2022  Authorization: [id number]       Juanjo to schedule with Dr Lionel KAUFMAN    [facility]

## 2021-12-03 NOTE — TELEPHONE ENCOUNTER
LVM to schedule cervical epidural steroid injection      Trinh ABEL    [facility]

## 2021-12-10 ENCOUNTER — LAB (OUTPATIENT)
Dept: FAMILY MEDICINE | Facility: CLINIC | Age: 71
End: 2021-12-10
Attending: STUDENT IN AN ORGANIZED HEALTH CARE EDUCATION/TRAINING PROGRAM
Payer: COMMERCIAL

## 2021-12-10 DIAGNOSIS — Z20.822 ENCOUNTER FOR LABORATORY TESTING FOR COVID-19 VIRUS: ICD-10-CM

## 2021-12-10 PROCEDURE — U0005 INFEC AGEN DETEC AMPLI PROBE: HCPCS

## 2021-12-10 PROCEDURE — U0003 INFECTIOUS AGENT DETECTION BY NUCLEIC ACID (DNA OR RNA); SEVERE ACUTE RESPIRATORY SYNDROME CORONAVIRUS 2 (SARS-COV-2) (CORONAVIRUS DISEASE [COVID-19]), AMPLIFIED PROBE TECHNIQUE, MAKING USE OF HIGH THROUGHPUT TECHNOLOGIES AS DESCRIBED BY CMS-2020-01-R: HCPCS

## 2021-12-11 LAB — SARS-COV-2 RNA RESP QL NAA+PROBE: NEGATIVE

## 2021-12-13 PROCEDURE — 62321 NJX INTERLAMINAR CRV/THRC: CPT | Performed by: STUDENT IN AN ORGANIZED HEALTH CARE EDUCATION/TRAINING PROGRAM

## 2021-12-14 ENCOUNTER — HOSPITAL ENCOUNTER (OUTPATIENT)
Dept: PALLIATIVE MEDICINE | Facility: CLINIC | Age: 71
Discharge: HOME | End: 2021-12-14
Payer: COMMERCIAL

## 2021-12-14 VITALS
SYSTOLIC BLOOD PRESSURE: 159 MMHG | TEMPERATURE: 98.1 F | DIASTOLIC BLOOD PRESSURE: 74 MMHG | RESPIRATION RATE: 18 BRPM | HEART RATE: 65 BPM

## 2021-12-14 DIAGNOSIS — M54.12 CERVICAL RADICULOPATHY: ICD-10-CM

## 2021-12-14 PROCEDURE — 255N000002 HC RX 255 OP 636: Performed by: STUDENT IN AN ORGANIZED HEALTH CARE EDUCATION/TRAINING PROGRAM

## 2021-12-14 PROCEDURE — 62321 NJX INTERLAMINAR CRV/THRC: CPT | Performed by: STUDENT IN AN ORGANIZED HEALTH CARE EDUCATION/TRAINING PROGRAM

## 2021-12-14 PROCEDURE — 250N000009 HC RX 250: Performed by: STUDENT IN AN ORGANIZED HEALTH CARE EDUCATION/TRAINING PROGRAM

## 2021-12-14 PROCEDURE — 250N000011 HC RX IP 250 OP 636: Performed by: STUDENT IN AN ORGANIZED HEALTH CARE EDUCATION/TRAINING PROGRAM

## 2021-12-14 RX ORDER — IOPAMIDOL 408 MG/ML
10 INJECTION, SOLUTION INTRATHECAL ONCE
Status: COMPLETED | OUTPATIENT
Start: 2021-12-14 | End: 2021-12-14

## 2021-12-14 RX ORDER — TRIAMCINOLONE ACETONIDE 40 MG/ML
40 INJECTION, SUSPENSION INTRA-ARTICULAR; INTRAMUSCULAR ONCE
Status: DISCONTINUED | OUTPATIENT
Start: 2021-12-14 | End: 2021-12-14 | Stop reason: CLARIF

## 2021-12-14 RX ORDER — DEXAMETHASONE SODIUM PHOSPHATE 10 MG/ML
10 INJECTION, SOLUTION INTRAMUSCULAR; INTRAVENOUS ONCE
Status: COMPLETED | OUTPATIENT
Start: 2021-12-14 | End: 2021-12-14

## 2021-12-14 RX ORDER — BUPIVACAINE HYDROCHLORIDE 2.5 MG/ML
10 INJECTION, SOLUTION INFILTRATION; PERINEURAL ONCE
Status: DISCONTINUED | OUTPATIENT
Start: 2021-12-14 | End: 2021-12-14 | Stop reason: CLARIF

## 2021-12-14 RX ADMIN — IOPAMIDOL 1 ML: 408 INJECTION, SOLUTION INTRATHECAL at 14:33

## 2021-12-14 RX ADMIN — DEXAMETHASONE SODIUM PHOSPHATE 10 MG: 10 INJECTION, SOLUTION INTRAMUSCULAR; INTRAVENOUS at 14:34

## 2021-12-14 RX ADMIN — LIDOCAINE HYDROCHLORIDE 3 ML: 10 INJECTION, SOLUTION EPIDURAL; INFILTRATION; INTRACAUDAL; PERINEURAL at 14:33

## 2021-12-14 NOTE — PROGRESS NOTES
[facility] - Procedure Note    Procedure performed: C7-T1 interlaminar epidural steroid injection with fluoroscopic guidance  Diagnosis: Cervical radiculitis/radiculopathy; Cervical spondylosis  : Kaylyn Flowers MD  Anesthesia: none    Indications: Agustin Chaudhry is a 71 year old male who is well known to me from previous visits, here for cervical epidural steroid injection. The patient describes severe R arm pain between deltoid and elbow, worse with elbow flexion with numbness of 4th and 5th fingers on R. The patient has been exhibiting symptoms consistent with cervical intraspinal inflammation and radiculopathy. Symptoms have been persistent, disabling, and intermittently severe. The patient reports minimal improvement with conservative treatment, including medications, PT.    Cervical MRI was done on 11/23/2021 that showed   C2-C3: Normal disc height. No herniation. Mild left-sided facet joint arthropathy. No spinal canal or neural foraminal stenosis.      C3-C4: Moderate loss of disc height. Low-grade retrolisthesis. Broad-based left paracentral disc protrusion. Mild cord flattening to the left of midline ventrally and mild spinal stenosis. No right-sided foraminal compromise with mild to moderate   left-sided foraminal narrowing. Mild left-sided facet joint arthropathy. Slight progression of degenerative changes at this level since prior study.      C4-C5: Mild loss of disc height with generalized disc bulge. No disc herniation. Mild spinal stenosis. Mild left-sided foraminal compromise without right-sided foraminal stenosis. Stable appearance from prior study. Mild bilateral facet joint   arthropathy.      C5-C6: Mild loss of disc height with generalized disc bulge. Broad-based right paracentral disc protrusion with mild spinal stenosis. No foraminal compromise is present. No significant change from prior study. Mild facet joint arthropathy.      C6-C7: Mild loss of disc  height with generalized disc bulge asymmetric to the right. No disc herniation. No spinal stenosis or left-sided foraminal compromise with mild right-sided foraminal narrowing due to facet osteophytes slightly progressed from   prior study.      C7-T1: Mild loss of disc height with annular bulge. No disc herniation. No spinal stenosis or foraminal compromise. Satisfactory facet joint appearance.    Allergies:      Allergies   Allergen Reactions     Codeine Nausea and Vomiting        Vitals:  There were no vitals taken for this visit.    Review of Systems: The patient denies recent fever, chills, illness, use of antibiotics or anticoagulants. All other 10-point review of systems negative.     Procedure: The procedure and risks were explained, and informed written consent was obtained from the patient. Risks include but are not limited to: infection, bleeding, increased pain, and damage to soft tissue, nerve, muscle, and vasculature structures. After getting informed consent, patient was brought into the procedure suite and was placed in a prone position on the procedure table. A Pause for the Cause was performed. Patient was prepped and draped in sterile fashion.     The C7-T1 interspace was identified with use of fluoroscopy in AP view. A 25-gauge, 1.5 inch needle was used to anesthetize the skin and subcutaneous tissue entry site with a total of 2 ml of 1% lidocaine. Under fluoroscopic visualization, a 22-gauge, 3.5 inch Tuohy epidural needle was slowly advanced towards the epidural space a few millimeters right of midline. The latter part of the needle advancement was guided with fluoroscopy in the contralateral oblique view. The epidural space was identified using loss of resistance technique. After negative aspiration for heme and cerebrospinal fluid, a total of 1 mL of Isovue 200 was injected to confirm needle placement. 9 mL of contrast was wasted. Epidurogram confirmed spread within the posterior epidural  space. 2 ml of 10mg/ml of dexamethasone and 1 ml of preservative free normal saline was injected. The needle was removed.  Images were saved to PACS.    The patient tolerated the procedure well, and there was no evidence of procedural complications. No new sensory or motor deficits were noted following the procedure. The patient was stable and able to ambulate on discharge home. Post-procedure instructions were provided.     Pre-procedure pain score: 9/10 in the neck, 9/10 in the arm  Post-procedure pain score: 6/10 in the neck, 6/10 in the arm    Assessment/Plan: Agustin Chaudhry is a 71 year old male s/p cervical interlaminar epidural steroid injection today for cervical spondylosis and radiculitis/radiculopathy.     1. Following today's procedure, the patient was advised to contact the [facility] for any of the following:   Fever, chills, or night sweats   New onset of pain, numbness, or weakness   Any questions/concerns regarding the procedure   2. Follow-up with me as scheduled 3-4 weeks post-procedure     Kaylyn Flowers MD  [facility]

## 2021-12-14 NOTE — PROGRESS NOTES
Pre-procedure Intake  If YES to any questions or NO to having a   Please complete laminated checklist and leave on the computer keyboard for Provider, verbally inform provider if able.    For SCS Trial, RFA's or any sedation procedure:  Have you been fasting? NA    If yes, for how long?     Are you taking any any blood thinners such as Coumadin, Warfarin, Jantoven, Pradaxa Xarelto, Eliquis, Edoxaban, Enoxaparin, Lovenox, Heparin, Arixtra, Fondaparinux, or Fragmin? OR Antiplatelet medication such as Plavix, Brilinta, or Effient?   No     If yes, when did you take your last dose?     Do you take aspirin?  No    If cervical procedure, have you held aspirin for 6 days?   NA    Do you have any allergies to contrast dye, iodine, steroid and/or numbing medications?  NO    Are you currently taking antibiotics or have an active infection?  NO    Have you had a fever/elevated temperature within the past week? NO    Are you currently taking oral steroids? NO    Do you have a ? Yes    Are you pregnant or breastfeeding?  Not Applicable    Have you received the COVID-19 vaccine? Yes    If yes, was it your 1st, 2nd or only dose needed? 3rd    Date of most recent vaccine: 11/30/21    Notify provider and RNs if systolic BP >170, diastolic BP >100, P >100 or O2 sats < 90%

## 2021-12-14 NOTE — DISCHARGE INSTRUCTIONS
[facility]   Procedure Discharge Instructions    Today you saw:          Dr. Kaylyn Flowers    You had an: C7-T1 interlaminar epidural steroid injection     Medications used:  Lidocaine   Bupivacaine   Kenalog  IsoVue             Do not drive for 6 hours. The effect of the local anesthetic could slow your reflexes.     You may resume your regular activities after 24 hours    Avoid strenuous activity for the first 24 hours    You may shower, however avoid swimming, tub baths or hot tubs for 24 hours following your procedure    You may have a mild to moderate increase in pain for several days following the injection.    It may take up to 14 days for the steroid medication to start working although you may feel the effect as early as a few days after the procedure.       You may use ice packs for 10-15 minutes, 3 to 4 times a day at the injection site for comfort    Do not use heat to painful areas for 6 to 8 hours. This will give the local anesthetic time to wear off and prevent you from accidentally burning your skin.     Unless you have been directed to avoid the use of anti-inflammatory medications (NSAIDS), you may use medications such as ibuprofen, Aleve or Tylenol for pain control if needed.     If you were fasting, you may resume your normal diet and medications after the procedure    Possible side effects of steroids that you may experience include flushing, elevated blood pressure, increased appetite, mild headaches and restlessness.  All of these symptoms will get better with time.    If you experience any of the following, call the Pain Clinic during work hours (Mon-Friday 8-4:30 pm) at [phone] or the Provider Line after hours at [phone]:  -Fever over 100 degree F  -Swelling, bleeding, redness, drainage, warmth at the injection site  -Progressive weakness or numbness in your  arms  -Loss of bowel or bladder function  -Unusual headache that is not relieved by  Tylenol or other pain reliever  -Unusual new onset of pain that is not improving

## 2021-12-20 ENCOUNTER — OFFICE VISIT (OUTPATIENT)
Dept: PALLIATIVE MEDICINE | Facility: CLINIC | Age: 71
End: 2021-12-20
Payer: COMMERCIAL

## 2021-12-20 ENCOUNTER — HOSPITAL ENCOUNTER (OUTPATIENT)
Dept: RADIOLOGY | Facility: HOSPITAL | Age: 71
Discharge: HOME | End: 2021-12-20
Attending: STUDENT IN AN ORGANIZED HEALTH CARE EDUCATION/TRAINING PROGRAM | Admitting: STUDENT IN AN ORGANIZED HEALTH CARE EDUCATION/TRAINING PROGRAM
Payer: COMMERCIAL

## 2021-12-20 VITALS — SYSTOLIC BLOOD PRESSURE: 175 MMHG | HEART RATE: 79 BPM | DIASTOLIC BLOOD PRESSURE: 94 MMHG

## 2021-12-20 DIAGNOSIS — M65.30 TRIGGER FINGER, ACQUIRED: ICD-10-CM

## 2021-12-20 DIAGNOSIS — M54.12 CERVICAL RADICULOPATHY: ICD-10-CM

## 2021-12-20 DIAGNOSIS — M79.2 INTRACTABLE NEUROPATHIC PAIN OF UPPER EXTREMITY: ICD-10-CM

## 2021-12-20 DIAGNOSIS — M75.101 ROTATOR CUFF SYNDROME, RIGHT: Primary | ICD-10-CM

## 2021-12-20 DIAGNOSIS — M75.101 ROTATOR CUFF SYNDROME, RIGHT: ICD-10-CM

## 2021-12-20 DIAGNOSIS — M18.0 ARTHRITIS OF CARPOMETACARPAL (CMC) JOINT OF BOTH THUMBS: ICD-10-CM

## 2021-12-20 DIAGNOSIS — G89.29 CHRONIC INTRACTABLE PAIN: ICD-10-CM

## 2021-12-20 PROCEDURE — 73030 X-RAY EXAM OF SHOULDER: CPT | Mod: RT

## 2021-12-20 PROCEDURE — 99215 OFFICE O/P EST HI 40 MIN: CPT | Performed by: STUDENT IN AN ORGANIZED HEALTH CARE EDUCATION/TRAINING PROGRAM

## 2021-12-20 ASSESSMENT — PAIN SCALES - GENERAL: PAINLEVEL: SEVERE PAIN (7)

## 2021-12-20 NOTE — PROGRESS NOTES
12/20/21 1056   PEG: A Thee-Item Scale Assessing Pain Intensity and Interference        0 = No pain / No interference    10 = Pain as bad as you can imagine / Completely interferes   What number best describes your pain on average in the past week? 7   What number best describes how, during the past week, pain has interfered with your enjoyment of life? 9   What number best describes how, during the past week, pain has interfered with your general activity? 9   PEG Total Score 8.33

## 2021-12-20 NOTE — PROGRESS NOTES
"                          [facility]  Follow up clinic visit    Date of visit: 12/20/2021    Chief complaint:   Chief Complaint   Patient presents with     Pain       History:  Agustin Chaudhry is a 71 year old male with a history of chronic pain syndrome, PTSD, depression, Hep C, EtOH abuse (4 yrs recovering), depression  who follows with the pain clinic for:   RUE neuropathic pain after CUATE at outside facility  Cervical radiculopathy  Chronic pain syndrome  bilateral CMC arthritis  Trigger finger  Last seen by me in clinic on 11/15/2021.      HPI from initial H&Pv11/15/2021:  \"Has bilateral hand pain. All throughout fingers. Has triggering of all fingers. Corticosteroid injections into triggering fingers and bilateral CMC joints. Left ring finger is the worst. The remainder of the fingers get stuck but he can manually unstick. Zinggers down bilateral pointer fingers. Thumbs are good. Had surgery on bilateral pointer A1 pulleys. Numbness on the R thumb after surgery. Constant pain in hands. Use is exacerbating. Rest is alleviating.      Has R arm pain. Has always had neck pain with restricted range of motion left and R. Improved with PT. Has had cervical ESIs - 1st and 2nd didn't work. 3rd procedure resulted in reduced abilities with R arm and pain in R arm. Painful constantly. Pain is between deltoid and elbow. Difficulty with elbow flexion due to pain. Flexing his bicep causes 12/10 pain along inferior aspect of deltoid. Dull throbbing pain. Constant. Severe. Any arm range of motion is painful. Numbness of the R arm ulnar aspect from wrist to shoulder. Ring and little finger are numb and feel swollen.      Neck doesn't hurt but range of motion to left and right is limited.      Smokes marijuana. Used to put him to sleep at night. Now it takes the edge off his pain. He refuses the [place] medical cannabis program because he felt like a criminal at the appointment of the last pain clinic he " "went to.      Relapse with EtOH after father passed 2017. Sober again for 4 yrs \"    Recommendations/plan at the last visit included:  \"Additional Workup:   1. - Diagnostic Studies: MRI cervical spine  Therapies:   2. - Clinical Health Psychologist to address issues of relaxation, behavioral change, coping style, and other factors important to improvement: not available at this time, given resources for [employer]  Medication Management:   3. - start medical cannabis  4. - start topical voltaren on hands  5. - start gabapentin 100 mg three times a day. Instructions given to contact me if not sufficiently beneficial  Further procedures recommended:   6. - Would like to consider cervical epidural steroid injection as appropriate pending cervical MRI  \"    Since his last visit, Agustin Chaudhry reports:  - Pain has subsided a lot but he still has R biceps pain  - The pain that was in the whole arm - upper trap area and nivia-scapular area has subsided post-injection  - Prior to the epidural steroid injection could not move his shoulder well - now can do 50% more than what he could do - shoulder abduction and forward flexion have improved  - After the injection the forearm from just distal to the elbow to the wrist is tight. No pain. He is satisfied with this since it doesn't hurt.   - Now has pain around the latera lcondyle. Also has pain around whole R biceps. Always feels as if the biceps has been ripped off. When he flexes the bicep he has tightness in the forearm and paresthesias with LT of the forearm near the wrist extension. Hand is also tighter and more numbness.   - Slept better last night than he has in years, attributes this to the cervical epidural steroid injection     Pain scores:  Pain intensity on average is 7 on a scale of 0-10.     Current pain treatments:   Topical voltaren -   Duloxetine -   Medical cannabis - certified, unclear if using  Prescribed gabapentin -     Past pain " treatments:     Previous Pain Relevant Medications:              NSAIDS: celebrex - NH for finger pain; No ibuprofen - doesn't like pills               Neuropathics: gabapentin for hands but not arm                     Topicals: current topical diclofenac              Other medications not covered above: PO steroids NH     Other treatments have included:              PT: 2016 - really doesn't want to go back - waste of time and money               Psychologist:               Injections: chronic R neuropathic pain after cervical Transforaminal epidural steroid injection   - C7/T1 interlaminar epidural steroid injection on 12/14/2021              Self-care:              Surgeries related to pain: trigger finger release x2, CMC joint surgery L and R    Medications:  Current Outpatient Medications   Medication Sig Dispense Refill     diclofenac (VOLTAREN) 1 % topical gel Apply 2 g topically 4 times daily as needed for moderate pain 50 g 3     DULoxetine (CYMBALTA) 60 MG capsule TAKE ONE CAPSULE BY MOUTH EVERY DAY 30 capsule 0     gabapentin (NEURONTIN) 100 MG capsule Take 1 capsule (100 mg) by mouth At Bedtime for 2 days, THEN 1 capsule (100 mg) 2 times daily for 2 days, THEN 1 capsule (100 mg) 3 times daily for 26 days. 90 capsule 1     lisinopril (ZESTRIL) 40 MG tablet Take 1 tablet (40 mg) by mouth daily 90 tablet 3     prazosin (MINIPRESS) 1 MG capsule Take 3 capsules (3 mg) by mouth At Bedtime (Patient not taking: Reported on 10/6/2021) 90 capsule 7     tamsulosin (FLOMAX) 0.4 MG capsule Take 1 capsule (0.4 mg) by mouth daily 90 capsule 3       Medical History: any changes in medical history since they were last seen? No    Social History:  Home situation: . Wife passed age 45. Lives with best friend  Occupation/Schooling: former . Took care of parents while in hospice 2017  Tobacco use: years ago  Alcohol use: in remission  Drug use: no  History of chemical dependency treatment: for EtOH  "x2    Physical Exam:  Blood pressure (!) 175/94, pulse 79.  General: NAD  Gait: Normal  Psych: Mood is ok. Affect is congruent. Thought content is logical.   Neuro: CNs II - XII grossly intact  MSK exam:   Shoulder exam:  Right    Range of Motion: Forward flexion: full      Abduction:  160    Inspection:  No visible deformity noted.    Palpation:   Sternoclavicular joint nontender     Acromioclavicular joint nontender     Subacromial space nontender     Posterior shoulder and periscapular region nontender     Strength: Abduction (arm at side) 4/5    Impingement: Neer (forward flexion with internal rotation) painful    Floyd (IR with arm flexed, abducted) painful    Empty can (thumb down in scapular plane) pain free    Crossover (AC joint compression) painful    Biceps tendinopathy:   Speed's (resisted forward flexion with biceps palpation): painful  Yergason's (resisted supination with biceps palpation): painful    AC joint:   Tenderness to palpation/deformity of AC joint:   Scarf (crossbody adduction): painful  Painful arc (150-180 degrees of abduction): pain free    SLAP lesion:   Speed's: painful     Skin: No visible abnormalities    Lymphatics: No edema    Sensation:  Normal sensation over shoulder and upper extremity      Diagnostic tests:  MRI of cervical spine was completed on 10/26/218 at outside facility showing:  \"CONCLUSION:   1.  Multilevel degenerative change in the cervical spine is as detailed above, with multilevel small posterior disc osteophyte complexes, multilevel facet arthropathy, and multilevel mild ligamentum flavum hypertrophy. This contributes to a moderate spinal canal stenosis at the C3-C4 and C5-C6 levels.     2.  Multilevel neural foraminal stenosis is most pronounced on the left at C3-C4, C4-C5, and C5-C6  \"    I am unable to view results of EMG, but per chart review EMG 7/252018 demonstrated left mild CTS    Assessment:   Agustin Chaudhry is a 71 year old male who is seen at the pain " clinic for:    1. Rotator cuff syndrome, right    2. Intractable neuropathic pain of upper extremity    3. Cervical radiculopathy    4. Arthritis of carpometacarpal (CMC) joint of both thumbs    5. Trigger finger, acquired    6. Chronic intractable pain           Mental Health - the patient's mental health concerns, specifically depression, affect his experience of pain and contribute to his clinically significant distress.    Right arm pain: Primary pain concern is chronic neuropathic pain of R UE after R cervical Transforaminal epidural steroid injection. Pain severely limiting life and would like to approach pain management from multidisciplinary perspective. Now s/p C7/T1 interlaminar epidural steroid injection with improvement of upper trapezius pain/periscapular pain but with residual pain focused around R biceps. Shoulder testing diffusely positive suggestive of possible impingement vs. Biceps tendinopathy. NO shoulder imaging so will get XRay.     Chronic bilateral hand pain: 2/2 trigger fingers (s/p trigger finger release for bilateral pointer fingers), CMC arthritis s/p surgical management.     Plan:  Additional Workup:     - Diagnostic Studies:  Right shoulder XRay  Therapies:     - Physical Therapy: completed 2016    - Clinical Health Psychologist to address issues of relaxation, behavioral change, coping style, and other factors important to improvement: not at this time, consider in the future  Medication Management:     - continue medical cannabis    - continue topical voltaren on hands    - unclear if using gabapentin previously prescribed  Procedures recommended:     - possible R GH intra-articular corticosteroid injection in the future  Recommendations for PCP     - recommended Pt follow up for elelvated BP    Follow up: I will call with results of XRay    BILLING TIME DOCUMENTATION:   The total TIME spent on this patient on the date of the encounter/appointment was 37 minutes.      TOTAL TIME  includes:   Time spent preparing to see the patient (reviewing records and tests) - 5 min  Time spent face to face (or over the phone) with the patient - 18 min  Time spent ordering tests, medications, procedures and referrals - 0 min  Time spent Referring and communicating with other healthcare professionals - 1 min  Time spent documenting clinical information in Epic - 13 min    Kaylyn Flowers MD  [facility] [place]

## 2021-12-20 NOTE — PATIENT INSTRUCTIONS
We are getting an XRay of your right shoulder to check to see if your rotator cuff/biceps tendon are also giving you pain.    Your next steps:  1. Schedule your XRay.  [facility] [place] [phone] - please call to schedule appointment     Follow up:   - I will call you with the results of your XRay and to talk about next steps    Kaylyn Flowers MD  [facility]     ----------------------------------------------------------------  Clinic Number:  [phone]     Call with any questions about your care and for scheduling assistance.     Calls are returned Monday through Friday between 8 AM and 4:30 PM. We usually get back to you within 2 business days depending on the issue/request.    If we are prescribing your medications:    For opioid medication refills, call the clinic or send a [employer] message 7 days in advance.  Please include:    Name of requested medication    Name of the pharmacy.    For non-opioid medications, call your pharmacy directly to request a refill. Please allow 3-4 days to be processed.     Per [place] State Law:    All controlled substance prescriptions must be filled within 30 days of being written.      For those controlled substances allowing refills, pickup must occur within 30 days of last fill.      We believe regular attendance is key to your success in our program!      Any time you are unable to keep your appointment we ask that you call us at least 24 hours in advance to cancel.This will allow us to offer the appointment time to another patient.     Multiple missed appointments may lead to dismissal from the clinic.

## 2021-12-22 ENCOUNTER — TELEPHONE (OUTPATIENT)
Dept: PALLIATIVE MEDICINE | Facility: CLINIC | Age: 71
End: 2021-12-22
Payer: COMMERCIAL

## 2021-12-22 NOTE — TELEPHONE ENCOUNTER
----- Message from Kaylyn Flowers MD sent at 2021 10:37 AM CST -----  Please advise Pt that XRay of R shoulder was normal. I will therefore order MRI of shoulder to further evaluate his shoulder pain.     He can call: [phone] to schedule appointment    Thank you!  Kaylyn Flowers MD  [facility]

## 2021-12-22 NOTE — TELEPHONE ENCOUNTER
"Able to give message below, as written by provider, he was able to schedule the MRI this morning    Alina García RN, BSN, CMSRN  RN Care Coordinator  [facility]    PS-  Agustin wanted to pass on to Dr. Kaylyn Flowers -  \"I'm really happy with what she's doing, she's a new doctor for me, I'm very confident in what's she doing , first time I've had relief (at least in 1/2 areas). We had a good time even during the procedure, I really like her & her team.\"      "

## 2021-12-29 ENCOUNTER — MYC MEDICAL ADVICE (OUTPATIENT)
Dept: PALLIATIVE MEDICINE | Facility: CLINIC | Age: 71
End: 2021-12-29
Payer: COMMERCIAL

## 2021-12-29 ENCOUNTER — HOSPITAL ENCOUNTER (OUTPATIENT)
Dept: MRI IMAGING | Facility: CLINIC | Age: 71
Discharge: HOME | End: 2021-12-29
Attending: STUDENT IN AN ORGANIZED HEALTH CARE EDUCATION/TRAINING PROGRAM | Admitting: STUDENT IN AN ORGANIZED HEALTH CARE EDUCATION/TRAINING PROGRAM
Payer: COMMERCIAL

## 2021-12-29 DIAGNOSIS — M75.101 ROTATOR CUFF SYNDROME, RIGHT: ICD-10-CM

## 2021-12-29 DIAGNOSIS — M75.101 ROTATOR CUFF SYNDROME, RIGHT: Primary | ICD-10-CM

## 2021-12-29 DIAGNOSIS — M79.2 INTRACTABLE NEUROPATHIC PAIN OF UPPER EXTREMITY: ICD-10-CM

## 2021-12-29 PROCEDURE — 73221 MRI JOINT UPR EXTREM W/O DYE: CPT | Mod: RT

## 2021-12-29 NOTE — TELEPHONE ENCOUNTER
Reviewed MRI of shoulder. He was thankful to see that something was there.  I had discussed with sports medicine provider, who thought this was appropriate to send to them to manage. As Dr. Flowers is out this week, I am putting in an order.  Discussed with patient, who is willing to do this plan.    To contact Ortho  to schedule, call [phone].     Araceli Beach MD on 12/29/2021 at 2:51 PM

## 2022-01-02 DIAGNOSIS — M75.101 ROTATOR CUFF SYNDROME, RIGHT: Primary | ICD-10-CM

## 2022-01-03 ENCOUNTER — TELEPHONE (OUTPATIENT)
Dept: PALLIATIVE MEDICINE | Facility: CLINIC | Age: 72
End: 2022-01-03
Payer: COMMERCIAL

## 2022-01-03 NOTE — TELEPHONE ENCOUNTER
Per Dr. Flowers no further intervention is needed.        Eden RN-BSN  [facility]

## 2022-01-03 NOTE — TELEPHONE ENCOUNTER
----- Message from Kaylyn Flowers MD sent at 1/2/2022  2:22 PM CST -----  Please let Pt know his R shoulder MRI shows tendonopathy and tearing of 3 of the rotator cuff tendons and also shows a partial tear of one of the biceps tendons.  I think it would be [place] for him to see an orthopedic doctor to discuss treatment options.   I have placed an order for a referral.    Kaylyn Flowers MD  [facility]

## 2022-01-03 NOTE — TELEPHONE ENCOUNTER
Eden Herrera RN   Creation Time:  1/3/2022  9:22 AM           ----- Message from Kaylyn Flowers MD sent at 1/2/2022  2:22 PM CST -----  Please let Pt know his R shoulder MRI shows tendonopathy and tearing of 3 of the rotator cuff tendons and also shows a partial tear of one of the biceps tendons.  I think it would be [place] for him to see an orthopedic doctor to discuss treatment options.   I have placed an order for a referral.     Kaylyn Flowers MD  [facility]      ____________________________      12/29/21:  Dr. Beach has placed a referral for ortho    1/7/22: US-guided shoulder injection scheduled w/ Dr. Davis per Dr. Beach.    Dr. Flowers does anything else need to be done?      Eden RN-BSN  [facility]

## 2022-01-07 ENCOUNTER — OFFICE VISIT (OUTPATIENT)
Dept: ORTHOPEDICS | Facility: CLINIC | Age: 72
End: 2022-01-07
Payer: COMMERCIAL

## 2022-01-07 VITALS — SYSTOLIC BLOOD PRESSURE: 130 MMHG | DIASTOLIC BLOOD PRESSURE: 84 MMHG

## 2022-01-07 DIAGNOSIS — M75.101 ROTATOR CUFF SYNDROME, RIGHT: ICD-10-CM

## 2022-01-07 DIAGNOSIS — M75.31 CALCIFIC TENDINITIS OF RIGHT SHOULDER: Primary | ICD-10-CM

## 2022-01-07 PROCEDURE — 20611 DRAIN/INJ JOINT/BURSA W/US: CPT | Mod: RT | Performed by: FAMILY MEDICINE

## 2022-01-07 PROCEDURE — 99203 OFFICE O/P NEW LOW 30 MIN: CPT | Mod: 25 | Performed by: FAMILY MEDICINE

## 2022-01-07 RX ORDER — ROPIVACAINE HYDROCHLORIDE 5 MG/ML
3 INJECTION, SOLUTION EPIDURAL; INFILTRATION; PERINEURAL
Status: SHIPPED | OUTPATIENT
Start: 2022-01-07

## 2022-01-07 RX ORDER — BETAMETHASONE SODIUM PHOSPHATE AND BETAMETHASONE ACETATE 3; 3 MG/ML; MG/ML
6 INJECTION, SUSPENSION INTRA-ARTICULAR; INTRALESIONAL; INTRAMUSCULAR; SOFT TISSUE
Status: SHIPPED | OUTPATIENT
Start: 2022-01-07

## 2022-01-07 RX ADMIN — BETAMETHASONE SODIUM PHOSPHATE AND BETAMETHASONE ACETATE 6 MG: 3; 3 INJECTION, SUSPENSION INTRA-ARTICULAR; INTRALESIONAL; INTRAMUSCULAR; SOFT TISSUE at 10:44

## 2022-01-07 RX ADMIN — ROPIVACAINE HYDROCHLORIDE 3 ML: 5 INJECTION, SOLUTION EPIDURAL; INFILTRATION; PERINEURAL at 10:44

## 2022-01-07 NOTE — PROGRESS NOTES
ASSESSMENT & PLAN    Agustin was seen today for pain.    Diagnoses and all orders for this visit:    Rotator cuff syndrome, right  -     Orthopedic  Referral  -     Orthopedic  Referral      This issue is acute on chronic and Worsening.    # Acute on Chronic Right Shoulder Pain: Longstanding condition for patient over 10+ years worsening recently with pain in the shoulder limiting his ability to lift it or use it any meaningful function.  There is pain over the anterior lateral portion of the shoulder but also diffusely on palpation.  He does have intact range of motion but does have pain with rotator cuff testing.  Reviewed previous shoulder x-ray showing calcific tendinitis in the shoulder, MRI showing rotator cuff tears also likely contributing to his pain.  Given symptoms limit his ability to function on a day-to-day basis plan to treat as below and follow-up with pain if the arm numbness/tingling is persisting as this is not likely due to his shoulder.  Image Findings: reviewed right shoulder x-rays and MRI showing irritated rotator cuff tendons  Treatment: Activities as tolerated, home exercises given today   Medications/Injections: Limited tylenol/ibuprofen for pain for 1-2 weeks, right subacromial steroid injection   Follow-up: In one month if symptoms do not improve, sooner if worsening  Can consider shoulder joint steroid injection     Gasper Davis MD  [facility]    -----  Chief Complaint   Patient presents with     Right Shoulder - Pain       SUBJECTIVE  Agustin Chaudhry is a/an 71 year old male who is seen in consultation at the request of Kaylyn Flowers M.D. for evaluation of right shoulder pain.     The patient is seen by themselves.  The patient is Right handed    Onset: 45-50 years(s) ago. Reports insidious onset without acute precipitating event. Patient saw Dr. Flowers 12/20/21 and MRI was performed 12/29/21.  Top of shoulder going to  elbow.  Had cervical steroid injection helped some.  Has persisting intermittent numbness over right arm/hand.  Pain limiting ability to bring arm overhead, has trouble sleeping due to this as well.    Location of Pain: right superior shoulder radiating down bicep   Worsened by: constant pain   Better with: nothing   Treatments tried: several cervical injections, prescribed medications   Associated symptoms: numbness and tingling radiating to hand     Orthopedic/Surgical history: YES - Chronic pain  Social History/Occupation: Retired     No family history pertinent to patient's problem today.      REVIEW OF SYSTEMS:  Review of Systems  Constitutional, HEENT, cardiovascular, pulmonary, GI, , musculoskeletal, neuro, skin, endocrine and psych systems are negative, except as otherwise noted.    OBJECTIVE:  There were no vitals taken for this visit.   General: healthy, alert and in no distress  HEENT: no scleral icterus or conjunctival erythema  Skin: no suspicious lesions or rash. No jaundice.  CV: distal perfusion intact   Resp: normal respiratory effort without conversational dyspnea   Psych: normal mood and affect  Gait: normal steady gait with appropriate coordination and balance   Neuro: Normal light sensory exam of right upper extremity     RIGHT SHOULDER  Inspection:    no swelling, bruising, discoloration, or obvious deformity or asymmetry  Palpation:    Tender about the supraspinatus insertion. Remainder of bony and tendinous landmarks are nontender.  Active Range of Motion:     Abduction 1800, FF 1800, , IR L1.    Strength:    Scapular plane abduction 5-/5, painful,  ER 5/5, IR 5/5, biceps 5/5, triceps 5/5  Special Tests:    Positive: Neer's, Floyd', supraspinatus (empty can), Phelps's and Speed's    CERVICAL SPINE  Inspection:    normal cervical lordosis present, rounded shoulders, forward head posture  Palpation:    Nontender.  Range of Motion:     Flexion full    Extension full    Right side bend  full    Left side bend full    Right rotation full    Left rotation full  Strength:    Full strength throughout all neck muscles  Special Tests:    Positive: None    Negative: Spurling's (bilateral)      RADIOLOGY:  I independently , visualized and reviewed these images with the patient  Reviewed shoulder xr and right shoulder MRI.    EXAM: MR SHOULDER RIGHT W/O CONTRAST  LOCATION: [facility]  DATE/TIME: 12/29/2021 8:05 AM     INDICATION: Shoulder pain, rotator cuff disorder suspected, x-ray done.  COMPARISON: 12/20/2021 radiograph.  TECHNIQUE: Unenhanced.     FINDINGS:     ROTATOR CUFF:  -Supraspinatus: There is a high-grade tear involving the distal supraspinatus tendon with both articular and bursal-sided components measuring approximately 9 mm in AP dimension. Moderate underlying tendinopathy. No retraction. Muscle bulk is normal.     -Infraspinatus: Advanced infraspinatus tendinopathy with chronic appearing cystic change along the undersurface of the myotendinous junction, likely due to a low-grade articular-sided delaminating tear. No high-grade or retracted tear. Muscle bulk is   normal.     -Subscapularis: Moderate subscapularis tendinopathy with a superimposed mild to moderate grade distal tear of the cranial fibers. No retraction. Muscle bulk is normal.  -Teres minor: Partial fatty atrophy of teres minor. No mass in the quadrilateral space. Tendon is intact.     CORACOACROMIAL ARCH:  -Morphology: Type II acromion. Shallow subacromial spur.   -Bursa: Small amount of fluid in the subacromial subdeltoid bursa.     ACROMIOCLAVICULAR JOINT:   -Moderate AC joint arthrosis.      LONG HEAD OF BICEPS TENDON:   -Attritional thinning and chronic partial tear of the biceps tendon. There is fluid in the tendon sheath.     GLENOHUMERAL JOINT:   -Labrum: No displaced labral tear or paralabral cyst.   -Cartilage: Generalized articular cartilage thinning most pronounced along superior medial  humeral head. Glenoid articulation shows no high-grade defects.   -Joint space: No effusion or synovitis.  -Glenohumeral ligaments and capsule: There is increased soft tissue and thickening within the rotator interval. Mild thickening and pericapsular edema about the axillary pouch.     BONES:   -No fracture or concerning marrow replacing lesion.     SOFT TISSUES:   -Normal deltoid muscle bulk. Normal visualized chest wall and axilla.                                                                      IMPRESSION:  1.  High-grade distal supraspinatus tendon tear measuring 9 mm in AP dimension. No retraction.     2.  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 Small amount of cystic fluid at the myotendinous junction.     3.  Partial-thickness tear distal, cranial fibers of the subscapularis tendon.     4.  Chronic-appearing attritional thinning and partial tear of the biceps tendon.     5.  Glenohumeral synovitis and capsulitis.     6.  Mild subacromial/subdeltoid bursitis.     7.  No fracture.      EXAM: XR SHOULDER RIGHT G/E 3 VIEWS  LOCATION: [facility]  DATE/TIME: 12/20/2021 1:48 PM     INDICATION:  Rotator cuff syndrome, right  COMPARISON: None.                                                                      IMPRESSION: Normal joint spaces and alignment. No fracture. No degenerative changes.    Review of external notes as documented elsewhere in note  Review of the result(s) of each unique test - right shoulder x-rays and MRI     Large Joint Injection/Arthocentesis: R subacromial bursa    Date/Time: 1/7/2022 10:44 AM  Performed by: Gasper Davis MD  Authorized by: Gasper Davis MD     Indications:  Pain  Needle Size:  25 G  Guidance: ultrasound    Approach:  Lateral  Location:  Shoulder      Site:  R subacromial bursa  Medications:  6 mg betamethasone acet & sod phos 6 (3-3) MG/ML; 3 mL ropivacaine 5  MG/ML  Medications comment:  Actual amount of ropivacaine used 4 mL  Outcome:  Tolerated well, no immediate complications  Procedure discussed: discussed risks, benefits, and alternatives    Consent Given by:  Patient  Timeout: timeout called immediately prior to procedure    Prep: patient was prepped and draped in usual sterile fashion     Patient reported significant improvement of pain after the numbing portion right subacromial steroid injection.  Aftercare instructions given to patient.  Plan to follow-up as discussed above.     Gasper Davis MD [facility]

## 2022-01-07 NOTE — LETTER
1/7/2022         RE: Agustin Chaudhry  [address]  [place] [address]        Dear Colleague,    Thank you for referring your patient, Agustin Chaudhry, to the [facility]. Please see a copy of my visit note below.    ASSESSMENT & PLAN    Agustin was seen today for pain.    Diagnoses and all orders for this visit:    Rotator cuff syndrome, right  -     Orthopedic  Referral  -     Orthopedic  Referral      This issue is acute on chronic and Worsening.    # Acute on Chronic Right Shoulder Pain: Longstanding condition for patient over 10+ years worsening recently with pain in the shoulder limiting his ability to lift it or use it any meaningful function.  There is pain over the anterior lateral portion of the shoulder but also diffusely on palpation.  He does have intact range of motion but does have pain with rotator cuff testing.  Reviewed previous shoulder x-ray showing calcific tendinitis in the shoulder, MRI showing rotator cuff tears also likely contributing to his pain.  Given symptoms limit his ability to function on a day-to-day basis plan to treat as below and follow-up with pain if the arm numbness/tingling is persisting as this is not likely due to his shoulder.  Image Findings: reviewed right shoulder x-rays and MRI showing irritated rotator cuff tendons  Treatment: Activities as tolerated, home exercises given today   Medications/Injections: Limited tylenol/ibuprofen for pain for 1-2 weeks, right subacromial steroid injection   Follow-up: In one month if symptoms do not improve, sooner if worsening  Can consider shoulder joint steroid injection     Gasper Davis MD  [facility]    -----  Chief Complaint   Patient presents with     Right Shoulder - Pain       SUBJECTIVE  Agustin Chaudhry is a/an 71 year old male who is seen in consultation at the request of Kaylyn Flowers M.D. for evaluation of  right shoulder pain.     The patient is seen by themselves.  The patient is Right handed    Onset: 45-50 years(s) ago. Reports insidious onset without acute precipitating event. Patient saw Dr. Flowers 12/20/21 and MRI was performed 12/29/21.  Top of shoulder going to elbow.  Had cervical steroid injection helped some.  Has persisting intermittent numbness over right arm/hand.  Pain limiting ability to bring arm overhead, has trouble sleeping due to this as well.    Location of Pain: right superior shoulder radiating down bicep   Worsened by: constant pain   Better with: nothing   Treatments tried: several cervical injections, prescribed medications   Associated symptoms: numbness and tingling radiating to hand     Orthopedic/Surgical history: YES - Chronic pain  Social History/Occupation: Retired     No family history pertinent to patient's problem today.      REVIEW OF SYSTEMS:  Review of Systems  Constitutional, HEENT, cardiovascular, pulmonary, GI, , musculoskeletal, neuro, skin, endocrine and psych systems are negative, except as otherwise noted.    OBJECTIVE:  There were no vitals taken for this visit.   General: healthy, alert and in no distress  HEENT: no scleral icterus or conjunctival erythema  Skin: no suspicious lesions or rash. No jaundice.  CV: distal perfusion intact   Resp: normal respiratory effort without conversational dyspnea   Psych: normal mood and affect  Gait: normal steady gait with appropriate coordination and balance   Neuro: Normal light sensory exam of right upper extremity     RIGHT SHOULDER  Inspection:    no swelling, bruising, discoloration, or obvious deformity or asymmetry  Palpation:    Tender about the supraspinatus insertion. Remainder of bony and tendinous landmarks are nontender.  Active Range of Motion:     Abduction 1800, FF 1800, , IR L1.    Strength:    Scapular plane abduction 5-/5, painful,  ER 5/5, IR 5/5, biceps 5/5, triceps 5/5  Special Tests:    Positive:  Neer's, Floyd', supraspinatus (empty can), [place]fax's and Speed's    CERVICAL SPINE  Inspection:    normal cervical lordosis present, rounded shoulders, forward head posture  Palpation:    Nontender.  Range of Motion:     Flexion full    Extension full    Right side bend full    Left side bend full    Right rotation full    Left rotation full  Strength:    Full strength throughout all neck muscles  Special Tests:    Positive: None    Negative: Spurling's (bilateral)      RADIOLOGY:  I independently , visualized and reviewed these images with the patient  Reviewed shoulder xr and right shoulder MRI.    EXAM: MR SHOULDER RIGHT W/O CONTRAST  LOCATION: [facility]  DATE/TIME: 12/29/2021 8:05 AM     INDICATION: Shoulder pain, rotator cuff disorder suspected, x-ray done.  COMPARISON: 12/20/2021 radiograph.  TECHNIQUE: Unenhanced.     FINDINGS:     ROTATOR CUFF:  -Supraspinatus: There is a high-grade tear involving the distal supraspinatus tendon with both articular and bursal-sided components measuring approximately 9 mm in AP dimension. Moderate underlying tendinopathy. No retraction. Muscle bulk is normal.     -Infraspinatus: Advanced infraspinatus tendinopathy with chronic appearing cystic change along the undersurface of the myotendinous junction, likely due to a low-grade articular-sided delaminating tear. No high-grade or retracted tear. Muscle bulk is   normal.     -Subscapularis: Moderate subscapularis tendinopathy with a superimposed mild to moderate grade distal tear of the cranial fibers. No retraction. Muscle bulk is normal.  -Teres minor: Partial fatty atrophy of teres minor. No mass in the quadrilateral space. Tendon is intact.     CORACOACROMIAL ARCH:  -Morphology: Type II acromion. Shallow subacromial spur.   -Bursa: Small amount of fluid in the subacromial subdeltoid bursa.     ACROMIOCLAVICULAR JOINT:   -Moderate AC joint arthrosis.      LONG HEAD OF BICEPS TENDON:    -Attritional thinning and chronic partial tear of the biceps tendon. There is fluid in the tendon sheath.     GLENOHUMERAL JOINT:   -Labrum: No displaced labral tear or paralabral cyst.   -Cartilage: Generalized articular cartilage thinning most pronounced along superior medial humeral head. Glenoid articulation shows no high-grade defects.   -Joint space: No effusion or synovitis.  -Glenohumeral ligaments and capsule: There is increased soft tissue and thickening within the rotator interval. Mild thickening and pericapsular edema about the axillary pouch.     BONES:   -No fracture or concerning marrow replacing lesion.     SOFT TISSUES:   -Normal deltoid muscle bulk. Normal visualized chest wall and axilla.                                                                      IMPRESSION:  1.  High-grade distal supraspinatus tendon tear measuring 9 mm in AP dimension. No retraction.     2.  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 Small amount of cystic fluid at the myotendinous junction.     3.  Partial-thickness tear distal, cranial fibers of the subscapularis tendon.     4.  Chronic-appearing attritional thinning and partial tear of the biceps tendon.     5.  Glenohumeral synovitis and capsulitis.     6.  Mild subacromial/subdeltoid bursitis.     7.  No fracture.      EXAM: XR SHOULDER RIGHT G/E 3 VIEWS  LOCATION: [facility]  DATE/TIME: 12/20/2021 1:48 PM     INDICATION:  Rotator cuff syndrome, right  COMPARISON: None.                                                                      IMPRESSION: Normal joint spaces and alignment. No fracture. No degenerative changes.    Review of external notes as documented elsewhere in note  Review of the result(s) of each unique test - right shoulder x-rays and MRI     Large Joint Injection/Arthocentesis: R subacromial bursa    Date/Time: 1/7/2022 10:44 AM  Performed by: Gasper Davis  MD JAIME  Authorized by: Gasper Davis MD     Indications:  Pain  Needle Size:  25 G  Guidance: ultrasound    Approach:  Lateral  Location:  Shoulder      Site:  R subacromial bursa  Medications:  6 mg betamethasone acet & sod phos 6 (3-3) MG/ML; 3 mL ropivacaine 5 MG/ML  Medications comment:  Actual amount of ropivacaine used 4 mL  Outcome:  Tolerated well, no immediate complications  Procedure discussed: discussed risks, benefits, and alternatives    Consent Given by:  Patient  Timeout: timeout called immediately prior to procedure    Prep: patient was prepped and draped in usual sterile fashion     Patient reported significant improvement of pain after the numbing portion right subacromial steroid injection.  Aftercare instructions given to patient.  Plan to follow-up as discussed above.     Gasper Davis MD [facility]              Again, thank you for allowing me to participate in the care of your patient.        Sincerely,        Gasper Davis MD

## 2022-01-07 NOTE — PATIENT INSTRUCTIONS
# Acute on Chronic Right Shoulder Pain: Longstanding condition for patient over 10+ years worsening recently with pain in the shoulder limiting his ability to lift it or use it any meaningful function.  There is pain over the anterior lateral portion of the shoulder but also diffusely on palpation.  He does have intact range of motion but does have pain with rotator cuff testing.  Reviewed previous shoulder x-ray showing calcific tendinitis in the shoulder, MRI showing rotator cuff tears also likely contributing to his pain.  Given symptoms limit his ability to function on a day-to-day basis plan to treat as below and follow-up with pain if the arm numbness/tingling is persisting as this is not likely due to his shoulder.  Image Findings: reviewed right shoulder x-rays and MRI showing irritated rotator cuff tendons  Treatment: Activities as tolerated, home exercises given today   Medications/Injections: Limited tylenol/ibuprofen for pain for 1-2 weeks, right subacromial steroid injection   Follow-up: In one month if symptoms do not improve, sooner if worsening  Can consider shoulder joint steroid injection     Please call [phone]   Ask for my team if you have any questions or concerns    If you have not yet received the influenza vaccine but would like to get one, please call  [phone] or you can schedule via [email or web address]    It was great seeing you today!    Gasper Davis MD, CAQSM     [facility] [place] [facility]    Procedure: right shoulder subacromial steroid injection       You may shower, however avoid swimming, tub baths or hot tubs for 24 hours following your procedure    You may have a mild to moderate increase in pain for several days following the injection.    It may take up to 14 days for the steroid medication to start working although you may feel the effect as early as a few days after the procedure.    You may use ice packs for 10-15 minutes, 3 to 4 times a day at the injection  site for comfort    You may use anti-inflammatory medications (such as Ibuprofen or Aleve or Advil) or Tylenol for pain control if necessary    If you were fasting, you may resume your normal diet and medications after the procedure    If you have diabetes, check your blood sugar more frequently than usual as your blood sugar may be higher than normal for 10-14 days following a steroid injection. Contact your doctor who manages your diabetes if your blood sugar is higher than usual      If you experience any of the following, call [facility] @ [phone] or [phone]  -Fever over 100 degree F  -Swelling, bleeding, redness, drainage, warmth at the injection site  - New or worsening pain

## 2022-01-21 ENCOUNTER — TELEPHONE (OUTPATIENT)
Dept: FAMILY MEDICINE | Facility: CLINIC | Age: 72
End: 2022-01-21
Payer: COMMERCIAL

## 2022-01-21 DIAGNOSIS — M79.2 NERVE PAIN: ICD-10-CM

## 2022-01-21 RX ORDER — DULOXETIN HYDROCHLORIDE 60 MG/1
60 CAPSULE, DELAYED RELEASE ORAL DAILY
Qty: 90 CAPSULE | Refills: 3 | Status: SHIPPED | OUTPATIENT
Start: 2022-01-21 | End: 2022-12-05

## 2022-01-21 NOTE — TELEPHONE ENCOUNTER
Patient has requested refills of     duloxetine  60mg daily  and    Gabapentin 100mg  3 caps at bedtime    Diclofenac gel     Patient has been without meds for ~week    Patient reports injections for right arm pain has taken his pain level from 15 - 4    Note routed to   /AIDEE

## 2022-01-21 NOTE — TELEPHONE ENCOUNTER
Mr. Chaudhry was transferred to the Clinic Manager because he is waiting for the refills he requested 2 weeks ago.   Mr. Chaudhry would like a refills of DULoxetine at a higher dose, and his gabapentin. In reviewing the chart it appears a e-request for DULoxetine was received, and possibly marked as not taking. I did not see a request for gabapentin.   Phone number was verified, pharmacy was not.  [place]/Clinic Manager

## 2022-02-17 ENCOUNTER — TELEPHONE (OUTPATIENT)
Dept: ORTHOPEDICS | Facility: CLINIC | Age: 72
End: 2022-02-17
Payer: COMMERCIAL

## 2022-02-17 NOTE — TELEPHONE ENCOUNTER
[facility] Call Center    Phone Message    May a detailed message be left on voicemail: yes     Reason for Call: Other: Patient is requesting a call back to discuss the injection isn't working.     Action Taken: Message routed to:  [facility]    Travel Screening: Not Applicable

## 2022-02-18 NOTE — TELEPHONE ENCOUNTER
Called and spoke with patient.  States that steroid injection provided only 3 weeks of relief.  Follow up scheduled for this upcoming Friday 2/25/22.    Salina Tariq ATC

## 2022-02-25 ENCOUNTER — OFFICE VISIT (OUTPATIENT)
Dept: ORTHOPEDICS | Facility: CLINIC | Age: 72
End: 2022-02-25
Payer: COMMERCIAL

## 2022-02-25 VITALS — SYSTOLIC BLOOD PRESSURE: 138 MMHG | DIASTOLIC BLOOD PRESSURE: 76 MMHG

## 2022-02-25 DIAGNOSIS — M25.511 CHRONIC RIGHT SHOULDER PAIN: Primary | ICD-10-CM

## 2022-02-25 DIAGNOSIS — G89.29 CHRONIC RIGHT SHOULDER PAIN: Primary | ICD-10-CM

## 2022-02-25 PROCEDURE — 99213 OFFICE O/P EST LOW 20 MIN: CPT | Mod: 25 | Performed by: FAMILY MEDICINE

## 2022-02-25 PROCEDURE — 20606 DRAIN/INJ JOINT/BURSA W/US: CPT | Mod: RT | Performed by: FAMILY MEDICINE

## 2022-02-25 RX ADMIN — ROPIVACAINE HYDROCHLORIDE 2 ML: 5 INJECTION, SOLUTION EPIDURAL; INFILTRATION; PERINEURAL at 11:05

## 2022-02-25 RX ADMIN — BETAMETHASONE SODIUM PHOSPHATE AND BETAMETHASONE ACETATE 6 MG: 3; 3 INJECTION, SUSPENSION INTRA-ARTICULAR; INTRALESIONAL; INTRAMUSCULAR; SOFT TISSUE at 11:05

## 2022-02-25 NOTE — PATIENT INSTRUCTIONS
# Right Shoulder Pain: Longstanding condition for patient with pain over the anterior shoulder now going down to his elbow with also some numbness and tingling noted in his forearm to his hand.  He has had several cervical epidural steroid injections without significant provement.  Last visit on 1/7/2022 a subacromial injection was completed that helped his pain for about 4 weeks.  On examination today there is pain over the anterior shoulder joint with symptoms worse with biceps testing.  It does seem that his pain is in a different location now more in the anterior.  Reviewed previous MRI showing partial tear of the rotator cuff as well as irritation of the biceps tendon.  I do think there could be a couple things causing his pain.  Given this it looks like his biceps tendon is causing most of the pain today.  Plan to treat as below follow-up if not improving.  Image Findings: Reviewed previous shoulder x-ray and MRI  Treatment: Activities as tolerated, home exercises given today  Medications/Injections: Limited tylenol/ibuprofen for pain for 1-2 weeks, right biceps tendon steroid injection  Follow-up: In one month if symptoms do not improve, sooner if worsening  Can consider shoulder joint injection versus further testing for numbness tingling in the arm, can consider procedure for shoulder calcification if not improving as well.    Please call [phone]   Ask for my team if you have any questions or concerns    If you have not yet received the influenza vaccine but would like to get one, please call  [phone] or you can schedule via [employer]    It was great seeing you again today!    Gasper Davis MD, CAQSM     [facility] Injection Discharge Instructions    Procedure: Right biceps tendon steroid injection       You may shower, however avoid swimming, tub baths or hot tubs for 24 hours following your procedure    You may have a mild to moderate increase in pain for several days following the  injection.    It may take up to 14 days for the steroid medication to start working although you may feel the effect as early as a few days after the procedure.    You may use ice packs for 10-15 minutes, 3 to 4 times a day at the injection site for comfort    You may use anti-inflammatory medications (such as Ibuprofen or Aleve or Advil) or Tylenol for pain control if necessary    If you were fasting, you may resume your normal diet and medications after the procedure    If you have diabetes, check your blood sugar more frequently than usual as your blood sugar may be higher than normal for 10-14 days following a steroid injection. Contact your doctor who manages your diabetes if your blood sugar is higher than usual      If you experience any of the following, call [facility] @ [phone] or [phone]  -Fever over 100 degree F  -Swelling, bleeding, redness, drainage, warmth at the injection site  - New or worsening pain

## 2022-02-25 NOTE — PROGRESS NOTES
ASSESSMENT & PLAN    # Right Shoulder Pain: Longstanding condition for patient with pain over the anterior shoulder now going down to his elbow with also some numbness and tingling noted in his forearm to his hand.  He has had several cervical epidural steroid injections without significant improvement.  Last visit on 1/7/2022 a subacromial injection was completed that helped his pain for about 4 weeks.  On examination today there is pain over the anterior shoulder joint with symptoms worse with biceps testing.  It does seem that his pain is in a different location now more in the anterior.  Reviewed previous MRI showing partial tear of the rotator cuff as well as irritation of the biceps tendon.  I do think there could be a couple things causing his pain.  Given this it looks like his biceps tendon is causing most of the pain today.  Plan to treat as below follow-up if not improving.  Image Findings: Reviewed previous shoulder x-ray and MRI  Treatment: Activities as tolerated, home exercises given today  Medications/Injections: Limited tylenol/ibuprofen for pain for 1-2 weeks, right biceps tendon steroid injection  Follow-up: In one month if symptoms do not improve, sooner if worsening  Can consider shoulder joint injection versus further testing for numbness tingling in the arm, can consider procedure for shoulder calcification if not improving as well.    -----    SUBJECTIVE:  Agustin Chaudhry is a 71 year old male who is seen in follow-up for right shoulder pain.They were last seen 1/7/2022 and right subacromial steroid injection was performed.  Steroid injection provided 3 weeks of good relief.  The patient is seen by themselves.    Since their last visit reports returned right shoulder pain.  Pain in the anterior elbow, can go to the hand.  Has numbness/tingling going to hand.  Has had cervical MRI 11/23/21 showing degenerative changes C3/4, C6/7.    They indicate that their current pain level is 8/10. They  have tried steroid injectgion.        Patient's past medical, surgical, social, and family histories were reviewed today and no changes are noted.    REVIEW OF SYSTEMS:  Constitutional: NEGATIVE for fever, chills, change in weight  Skin: NEGATIVE for worrisome rashes, moles or lesions  GI/: NEGATIVE for bowel or bladder changes  Neuro: NEGATIVE for weakness, dizziness or paresthesias    OBJECTIVE:  /76    General: healthy, alert and in no distress  HEENT: no scleral icterus or conjunctival erythema  Skin: no suspicious lesions or rash. No jaundice.  CV: regular rhythm by palpation, no pedal edema  Resp: normal respiratory effort without conversational dyspnea   Psych: normal mood and affect  Gait: normal steady gait with appropriate coordination and balance  Neuro: normal light touch sensory exam of the extremities.    MSK:    RIGHT SHOULDER  Inspection:    no swelling, bruising, discoloration, or obvious deformity or asymmetry  Palpation:    Tender about the proximal biceps tendon. Remainder of bony and tendinous landmarks are nontender.  Active Range of Motion:     Abduction 1650, FF 1650, , IR L1.    Strength:    Scapular plane abduction 5/5, painful,  ER 5/5, IR 5/5, biceps 5/5, triceps 5/5  Special Tests:    Positive: Neer's, Floyd', Carson's and Speed's    Negative: supraspinatus (empty can)     Independent visualization of the below image:    IMPRESSION:  1.  High-grade distal supraspinatus tendon tear measuring 9 mm in AP dimension. No retraction.     2.  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 Small amount of cystic fluid at the myotendinous junction.     3.  Partial-thickness tear distal, cranial fibers of the subscapularis tendon.     4.  Chronic-appearing attritional thinning and partial tear of the biceps tendon.     5.  Glenohumeral synovitis and capsulitis.     6.  Mild subacromial/subdeltoid bursitis.     7.  No  fracture.    Gasper Davis MD, [facility]    Medium Joint Injection/Arthrocentesis    Date/Time: 2/25/2022 11:05 AM  Performed by: Gasper Davis MD  Authorized by: Gasper Davis MD     Indications:  Pain  Needle Size:  25 G  Guidance: ultrasound    Approach:  Anterior  Location:  Shoulder  Location comment:  Right biceps tendon sheath   Medications:  6 mg betamethasone acet & sod phos 6 (3-3) MG/ML; 2 mL ropivacaine 5 MG/ML  Outcome:  Tolerated well, no immediate complications  Procedure discussed: discussed risks, benefits, and alternatives    Consent Given by:  Patient  Timeout: timeout called immediately prior to procedure    Prep: patient was prepped and draped in usual sterile fashion     Patient reported significant improvement of pain after the numbing portion of right biceps tendon sheath steroid injection.  Ultrasound guided images were permanently stored on ultrasound machine.  Aftercare instructions given to patient.  Plan to follow-up as discussed above.     Gasper Davis MD [facility]

## 2022-02-25 NOTE — LETTER
2/25/2022         RE: Agustin Chaudhry  [address]  [place] [address]        Dear Colleague,    Thank you for referring your patient, Agustin Chaudhry, to the [facility] [place]. Please see a copy of my visit note below.    ASSESSMENT & PLAN    # Right Shoulder Pain: Longstanding condition for patient with pain over the anterior shoulder now going down to his elbow with also some numbness and tingling noted in his forearm to his hand.  He has had several cervical epidural steroid injections without significant improvement.  Last visit on 1/7/2022 a subacromial injection was completed that helped his pain for about 4 weeks.  On examination today there is pain over the anterior shoulder joint with symptoms worse with biceps testing.  It does seem that his pain is in a different location now more in the anterior.  Reviewed previous MRI showing partial tear of the rotator cuff as well as irritation of the biceps tendon.  I do think there could be a couple things causing his pain.  Given this it looks like his biceps tendon is causing most of the pain today.  Plan to treat as below follow-up if not improving.  Image Findings: Reviewed previous shoulder x-ray and MRI  Treatment: Activities as tolerated, home exercises given today  Medications/Injections: Limited tylenol/ibuprofen for pain for 1-2 weeks, right biceps tendon steroid injection  Follow-up: In one month if symptoms do not improve, sooner if worsening  Can consider shoulder joint injection versus further testing for numbness tingling in the arm, can consider procedure for shoulder calcification if not improving as well.    -----    SUBJECTIVE:  Agustin Chaudhry is a 71 year old male who is seen in follow-up for right shoulder pain.They were last seen 1/7/2022 and right subacromial steroid injection was performed.  Steroid injection provided 3 weeks of good relief.  The patient is seen by themselves.    Since their  last visit reports returned right shoulder pain.  Pain in the anterior elbow, can go to the hand.  Has numbness/tingling going to hand.  Has had cervical MRI 11/23/21 showing degenerative changes C3/4, C6/7.    They indicate that their current pain level is 8/10. They have tried steroid injectgion.        Patient's past medical, surgical, social, and family histories were reviewed today and no changes are noted.    REVIEW OF SYSTEMS:  Constitutional: NEGATIVE for fever, chills, change in weight  Skin: NEGATIVE for worrisome rashes, moles or lesions  GI/: NEGATIVE for bowel or bladder changes  Neuro: NEGATIVE for weakness, dizziness or paresthesias    OBJECTIVE:  /76    General: healthy, alert and in no distress  HEENT: no scleral icterus or conjunctival erythema  Skin: no suspicious lesions or rash. No jaundice.  CV: regular rhythm by palpation, no pedal edema  Resp: normal respiratory effort without conversational dyspnea   Psych: normal mood and affect  Gait: normal steady gait with appropriate coordination and balance  Neuro: normal light touch sensory exam of the extremities.    MSK:    RIGHT SHOULDER  Inspection:    no swelling, bruising, discoloration, or obvious deformity or asymmetry  Palpation:    Tender about the proximal biceps tendon. Remainder of bony and tendinous landmarks are nontender.  Active Range of Motion:     Abduction 1650, FF 1650, , IR L1.    Strength:    Scapular plane abduction 5/5, painful,  ER 5/5, IR 5/5, biceps 5/5, triceps 5/5  Special Tests:    Positive: Neer's, Floyd', [place]'s and Speed's    Negative: supraspinatus (empty can)     Independent visualization of the below image:    IMPRESSION:  1.  High-grade distal supraspinatus tendon tear measuring 9 mm in AP dimension. No retraction.     2.  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 Small amount of cystic fluid at the myotendinous junction.     3.   Partial-thickness tear distal, cranial fibers of the subscapularis tendon.     4.  Chronic-appearing attritional thinning and partial tear of the biceps tendon.     5.  Glenohumeral synovitis and capsulitis.     6.  Mild subacromial/subdeltoid bursitis.     7.  No fracture.    Gasper Davis MD, [facility]    Medium Joint Injection/Arthrocentesis    Date/Time: 2/25/2022 11:05 AM  Performed by: Gasper Davis MD  Authorized by: Gasper Davis MD     Indications:  Pain  Needle Size:  25 G  Guidance: ultrasound    Approach:  Anterior  Location:  Shoulder  Location comment:  Right biceps tendon sheath   Medications:  6 mg betamethasone acet & sod phos 6 (3-3) MG/ML; 2 mL ropivacaine 5 MG/ML  Outcome:  Tolerated well, no immediate complications  Procedure discussed: discussed risks, benefits, and alternatives    Consent Given by:  Patient  Timeout: timeout called immediately prior to procedure    Prep: patient was prepped and draped in usual sterile fashion     Patient reported significant improvement of pain after the numbing portion of right biceps tendon sheath steroid injection.  Ultrasound guided images were permanently stored on ultrasound machine.  Aftercare instructions given to patient.  Plan to follow-up as discussed above.     Gasper Davis MD [facility]                Again, thank you for allowing me to participate in the care of your patient.        Sincerely,        Gasper Davis MD

## 2022-02-28 RX ORDER — ROPIVACAINE HYDROCHLORIDE 5 MG/ML
2 INJECTION, SOLUTION EPIDURAL; INFILTRATION; PERINEURAL
Status: SHIPPED | OUTPATIENT
Start: 2022-02-25

## 2022-02-28 RX ORDER — BETAMETHASONE SODIUM PHOSPHATE AND BETAMETHASONE ACETATE 3; 3 MG/ML; MG/ML
6 INJECTION, SUSPENSION INTRA-ARTICULAR; INTRALESIONAL; INTRAMUSCULAR; SOFT TISSUE
Status: SHIPPED | OUTPATIENT
Start: 2022-02-25

## 2022-04-07 ENCOUNTER — OFFICE VISIT (OUTPATIENT)
Dept: FAMILY MEDICINE | Facility: CLINIC | Age: 72
End: 2022-04-07
Payer: COMMERCIAL

## 2022-04-07 ENCOUNTER — ANCILLARY PROCEDURE (OUTPATIENT)
Dept: GENERAL RADIOLOGY | Facility: CLINIC | Age: 72
End: 2022-04-07
Attending: STUDENT IN AN ORGANIZED HEALTH CARE EDUCATION/TRAINING PROGRAM
Payer: COMMERCIAL

## 2022-04-07 VITALS
RESPIRATION RATE: 16 BRPM | WEIGHT: 122 LBS | HEART RATE: 70 BPM | DIASTOLIC BLOOD PRESSURE: 77 MMHG | SYSTOLIC BLOOD PRESSURE: 116 MMHG | OXYGEN SATURATION: 97 % | BODY MASS INDEX: 20.63 KG/M2 | TEMPERATURE: 98 F
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
## 2022-04-07 DIAGNOSIS — S22.32XA CLOSED FRACTURE OF ONE RIB OF LEFT SIDE, INITIAL ENCOUNTER: ICD-10-CM
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
## 2022-04-07 DIAGNOSIS — R07.81 RIB PAIN ON LEFT SIDE: ICD-10-CM

## 2022-04-07 PROCEDURE — 99213 OFFICE O/P EST LOW 20 MIN: CPT | Mod: GC | Performed by: STUDENT IN AN ORGANIZED HEALTH CARE EDUCATION/TRAINING PROGRAM

## 2022-04-07 PROCEDURE — 71100 X-RAY EXAM RIBS UNI 2 VIEWS: CPT | Mod: LT | Performed by: RADIOLOGY

## 2022-04-07 NOTE — PROGRESS NOTES
Preceptor Attestation:    I discussed the patient with the resident and evaluated the patient in person. I personally reviewed the imaging and agree with the interpretation documented by the resident. I have verified the content of the note, which accurately reflects my assessment of the patient and the plan of care.   Supervising Physician:  Aldo Sarmiento MD.

## 2022-04-07 NOTE — PATIENT INSTRUCTIONS
Use Voltaren gel up to 4 times a day as needed for the pain.       Patient Education     Rib Bruise   A rib bruise (contusion) can affect one or more rib bones. It may cause pain, tenderness, swelling, and a purplish discoloration. There may be a sharp pain while breathing.   You will be assessed for other injuries. You will likely be given pain medicine. Bruised ribs heal on their own, without further treatment. But the pain may take weeks to months to go away.    Note that a small crack (fracture) in the rib may cause the same symptoms as a bruised rib. The small crack may not be seen on a chest X-ray. But the conditions are managed in the same way.   Home care    Rest. Don't do heavy lifting, strenuous exertion, or any activity that causes pain.    Ice the area to reduce pain and swelling. Put ice cubes in a plastic bag or use a cold pack. Wrap the cold source in a thin towel. Don't place it directly on your skin. Ice the injured area for 20 minutes every 1 to 2 hours the first day. Continue with ice packs 3 to 4 times a day for the next 2 days, then as needed for the relief of pain and swelling.    Take any prescribed pain medicine as directed by your healthcare provider. If none was prescribed, take acetaminophen, ibuprofen, or naproxen to control pain.    If you have a significant injury, you may be given a device called an incentive spirometer to keep your lungs healthy. Use as directed.    Follow-up care  Follow up with your healthcare provider, or as advised.   When to seek medical advice  Call your healthcare provider for any of the following:     Increasing chest pain with breathing    Coughing    New or worsening pain    Fever of 100.4 F (38 C) or higher, or as directed by your healthcare provider  Call 911  Call 911, or get medical care right away if any of the following occur:     Shortness of breath or trouble breathing    Dizziness, weakness, or fainting  Terence last reviewed this educational  content on 8/1/2019 2000-2021 [employer]. All rights reserved. This information is not intended as a substitute for professional medical care. Always follow your healthcare professional's instructions.

## 2022-04-07 NOTE — PROGRESS NOTES
Assessment & Plan     Fall, initial encounter  Rib pain on left side  Closed fracture of one rib of left side, initial encounter  Mechanical fall. Found to have fracture of the left 7th rib. Discussed pain management and the importance of preventing pneumonia by working on deep breathing. Patient open to using voltaren gel instead of oral NSAIDs given his history of CKD.   - XR Ribs Left 2 Views; Future  - Voltaren gel up to 4x per day    Follow up if worsening pain or development of fever or cough.    Patient was seen by and discussed with attending physician, Dr. Sarmiento.     Geetha Browne MD  [facility]    Lola Velez is a 71 year old who presents for the following health issues     HPI     About one week ago he fell into a fire pit made of cider blocks. He fell and hit his left hip and ribs. He couldn't move for 2-3 days. Had been using heating pad for the pain. That was somewhat helpful. His hip bruised initially, but now it is feeling much better. He continues to have significant pain in the left chest. No fever, chills, or cough.       Review of Systems   Constitutional, HEENT, cardiovascular, pulmonary, gi and gu systems are negative, except as otherwise noted.      Objective    /77 (BP Location: Left arm, Patient Position: Sitting, Cuff Size: Adult Regular)   Pulse 70   Temp 98  F (36.7  C) (Oral)   Resp 16   Wt 55.3 kg (122 lb)   SpO2 97%   BMI 20.63 kg/m    Body mass index is 20.63 kg/m .  Physical Exam   GENERAL: alert and no distress  EYES: Eyes grossly normal to inspection, PERRL and conjunctivae and sclerae normal  RESP: lungs clear to auscultation - no rales, rhonchi or wheezes  CV: regular rate and rhythm, normal S1 S2, no S3 or S4, no murmur, click or rub, no peripheral edema and peripheral pulses strong  CHEST WALL: Barrel chested. Significant tenderness to palpation of lateral aspect of left ribs ?6-8. No bruising noted.  MS: no gross  musculoskeletal defects noted, no edema  SKIN: no suspicious lesions or rashes  NEURO: Normal strength and tone, mentation intact and speech normal  PSYCH: mentation appears normal, affect normal/bright      ----- Service Performed and Documented by Resident or Fellow ------

## 2022-04-08 NOTE — RESULT ENCOUNTER NOTE
Acute left 7th rib fracture. Called patient with results. Reiterated appropriate pain control and the importance of deep breathing to decrease risk of developing pneumonia. Pt in agreement with the plan.     Geetha Browne MD PGY3  [facility]  4/8/2022, 9:43 AM

## 2022-04-29 ENCOUNTER — TELEPHONE (OUTPATIENT)
Dept: ORTHOPEDICS | Facility: CLINIC | Age: 72
End: 2022-04-29
Payer: COMMERCIAL

## 2022-05-03 NOTE — TELEPHONE ENCOUNTER
Chart review shows patient has an appointment with Dr. Davis on 5/16/22  Paige Kaufman MS ATC      
[facility] Call Center    Phone Message    May a detailed message be left on voicemail: yes     Reason for Call: Other: Has questions in re: to injection in shoulder. Please contact patient  He wanted to know if he could get a shot in his elbow as well     Action Taken: Message routed to:  [facility] ortho    Travel Screening: Not Applicable                                                                        
Patient able to be scheduled with Dr. Davis for follow up evaluation for shoulder and elbow. Yudy Zavala, ATC    
emergency venous access

## 2022-05-16 ENCOUNTER — OFFICE VISIT (OUTPATIENT)
Dept: ORTHOPEDICS | Facility: CLINIC | Age: 72
End: 2022-05-16
Payer: COMMERCIAL

## 2022-05-16 VITALS
WEIGHT: 122 LBS | DIASTOLIC BLOOD PRESSURE: 104 MMHG | SYSTOLIC BLOOD PRESSURE: 207 MMHG | HEART RATE: 88 BPM | BODY MASS INDEX: 20.63 KG/M2

## 2022-05-16 DIAGNOSIS — G89.29 CHRONIC RIGHT SHOULDER PAIN: Primary | ICD-10-CM

## 2022-05-16 DIAGNOSIS — M25.521 RIGHT ELBOW PAIN: ICD-10-CM

## 2022-05-16 DIAGNOSIS — M25.511 CHRONIC RIGHT SHOULDER PAIN: Primary | ICD-10-CM

## 2022-05-16 PROCEDURE — 20606 DRAIN/INJ JOINT/BURSA W/US: CPT | Mod: RT | Performed by: FAMILY MEDICINE

## 2022-05-16 PROCEDURE — 20611 DRAIN/INJ JOINT/BURSA W/US: CPT | Mod: RT | Performed by: FAMILY MEDICINE

## 2022-05-16 PROCEDURE — 99214 OFFICE O/P EST MOD 30 MIN: CPT | Mod: 25 | Performed by: FAMILY MEDICINE

## 2022-05-16 RX ADMIN — ROPIVACAINE HYDROCHLORIDE 2 ML: 5 INJECTION, SOLUTION EPIDURAL; INFILTRATION; PERINEURAL at 14:19

## 2022-05-16 RX ADMIN — BETAMETHASONE SODIUM PHOSPHATE AND BETAMETHASONE ACETATE 6 MG: 3; 3 INJECTION, SUSPENSION INTRA-ARTICULAR; INTRALESIONAL; INTRAMUSCULAR; SOFT TISSUE at 14:19

## 2022-05-16 RX ADMIN — ROPIVACAINE HYDROCHLORIDE 4 ML: 5 INJECTION, SOLUTION EPIDURAL; INFILTRATION; PERINEURAL at 14:19

## 2022-05-16 NOTE — LETTER
5/16/2022         RE: Agustin Chaudhry  [address]  [place] [address]        Dear Colleague,    Thank you for referring your patient, Agustin Chaudhry, to the [facility] [place]. Please see a copy of my visit note below.    ASSESSMENT & PLAN    Agustin was seen today for pain and follow up.    Diagnoses and all orders for this visit:    Chronic right shoulder pain  -     Large Joint Injection/Arthocentesis: R subacromial bursa    Right elbow pain  -     XR Elbow Right G/E 3 Views; Future  -     Medium Joint Injection/Arthrocentesis: R elbow      # Right Shoulder Pain: Longstanding condition 10+ years for patient with pain over the anterior shoulder now going down to his elbow with also some numbness and tingling noted in his forearm to his hand.  He has had several cervical epidural steroid injections without significant improvement.  Last visit on 1/7/2022 subacromial steroid and biceps injection 2/25/22 was completed that helped his pain for about 4 weeks with pain returning.  On examination today there is pain over the anterior shoulder joint with symptoms worse rotator cuff testing.  He has pain with end elbow flexion and extension and worse with twisting the wrist (pronation/supination).  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  Reviewed previous MRI showing partial tear of the rotator cuff as well as irritation of the biceps tendon.  I do think there could be a couple things causing his pain.  Plan to treat as below follow-up if not improving.  Image Findings: Reviewed previous shoulder x-ray and MRI and elbow x-rays  Treatment: Activities as tolerated, Continue home exercises   Medications/Injections: Limited tylenol/ibuprofen for pain for 1-2 weeks, right subacromial and right elbow joint steroid injection   Follow-up: In one month if symptoms do not improve, sooner if worsening  Can consider shoulder joint  injection versus further testing for numbness tingling in the arm, can consider procedure for shoulder calcification if not improving as well.  -----    SUBJECTIVE:  Agustin Chaudhry is a 71 year old male who is seen in follow-up for right shoulder pain.They were last seen 1/7/2022 and right subacromial steroid injection was performed.  Steroid injection provided 3 weeks of good relief.  The patient is seen by themselves.    Since their last visit reports returned right shoulder pain.  Pain in the anterior elbow, can go to the hand.  Has numbness/tingling going to hand.  Has had cervical MRI 11/23/21 showing degenerative changes C3/4, C6/7.    They indicate that their current pain level is 8/10. They have tried steroid injectgion.      Interim History - May 16, 2022  Since last visit on 2/25/22 patient has not been doing good. He states he is back where he started.  He last got an injection in his right shoulder and felt like that helped.  He states that his elbow is pretty bad todday.   In his last appointment he received an injection.  He has given me an extensive history regarding the number of injections he has gotten.  The last injection has lasted him about 2 months.  He is interested in discussing his elbow and maybe we should explore his right elbow.  He is having a lot of pain with turning a door knob, turning a screw , opening a jar.  He has not tried anything to alleviate his pain.  No interim injury.        Patient's past medical, surgical, social, and family histories were reviewed today and no changes are noted.    REVIEW OF SYSTEMS:  Constitutional: NEGATIVE for fever, chills, change in weight  Skin: NEGATIVE for worrisome rashes, moles or lesions  GI/: NEGATIVE for bowel or bladder changes  Neuro: NEGATIVE for weakness, dizziness or paresthesias    OBJECTIVE:  BP (!) 207/104   Pulse 88   Wt 55.3 kg (122 lb)   BMI 20.63 kg/m     General: healthy, alert and in no distress  HEENT: no scleral  icterus or conjunctival erythema  Skin: no suspicious lesions or rash. No jaundice.  CV: regular rhythm by palpation, no pedal edema  Resp: normal respiratory effort without conversational dyspnea   Psych: normal mood and affect  Gait: normal steady gait with appropriate coordination and balance  Neuro: normal light touch sensory exam of the extremities.    MSK:    RIGHT SHOULDER  Inspection:    no swelling, bruising, discoloration, or obvious deformity or asymmetry  Palpation:    Tender about the proximal biceps tendon. Remainder of bony and tendinous landmarks are nontender.  Active Range of Motion:     Abduction 1650, FF 1650, , IR L1.    Strength:    Scapular plane abduction 5/5, painful,  ER 5/5, IR 5/5, biceps 5/5, triceps 5/5  Special Tests:    Positive: Neer's, Floyd', [place]'s and Speed's    Negative: supraspinatus (empty can)     Independent visualization of the below image:    IMPRESSION:  1.  High-grade distal supraspinatus tendon tear measuring 9 mm in AP dimension. No retraction.     2.  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 Small amount of cystic fluid at the myotendinous junction.     3.  Partial-thickness tear distal, cranial fibers of the subscapularis tendon.     4.  Chronic-appearing attritional thinning and partial tear of the biceps tendon.     5.  Glenohumeral synovitis and capsulitis.     6.  Mild subacromial/subdeltoid bursitis.     7.  No fracture.    Gasper Davis MD, [facility]    Large Joint Injection/Arthocentesis: R subacromial bursa    Date/Time: 5/16/2022 2:19 PM  Performed by: Gasper Davis MD  Authorized by: Gasper Davis MD     Indications:  Pain  Needle Size:  25 G  Guidance: ultrasound    Approach:  Lateral  Location:  Shoulder      Site:  R subacromial bursa  Medications:  6 mg betamethasone acet & sod phos 6 (3-3) MG/ML; 4 mL ropivacaine 5 MG/ML  Medications  comment:  Actual amount of ropivacaine used 4 mL  Outcome:  Tolerated well, no immediate complications  Procedure discussed: discussed risks, benefits, and alternatives    Consent Given by:  Patient  Timeout: timeout called immediately prior to procedure    Prep: patient was prepped and draped in usual sterile fashion     Ultrasound images of procedure were permanently stored.     Medium Joint Injection/Arthrocentesis: R elbow    Date/Time: 5/16/2022 2:19 PM  Performed by: Gasper Davis MD  Authorized by: Gasper Davis MD     Needle Size:  25 G  Guidance: ultrasound    Approach:  Lateral  Location:  Elbow  Site:  R elbow  Medications:  2 mL ropivacaine 5 MG/ML; 6 mg betamethasone acet & sod phos 6 (3-3) MG/ML  Outcome:  Tolerated well, no immediate complications  Procedure discussed: discussed risks, benefits, and alternatives    Timeout: timeout called immediately prior to procedure    Prep: patient was prepped and draped in usual sterile fashion     Patient reported significant improvement of pain after the numbing portion right subacromial bursa and elbow joint injection.  Ultrasound guided images were permanently stored.   Aftercare instructions given to patient.  Plan to follow-up as discussed above.     Gasper Davis MD [facility]                Again, thank you for allowing me to participate in the care of your patient.        Sincerely,        Gasper Davis MD

## 2022-05-16 NOTE — PATIENT INSTRUCTIONS
# Right Shoulder Pain: Longstanding condition 10+ years for patient with pain over the anterior shoulder now going down to his elbow with also some numbness and tingling noted in his forearm to his hand.  He has had several cervical epidural steroid injections without significant improvement.  Last visit on 1/7/2022 subacromial steroid and biceps injection 2/25/22 was completed that helped his pain for about 4 weeks with pain returning.  On examination today there is pain over the anterior shoulder joint with symptoms worse rotator cuff testing.  He has pain with end elbow flexion and extension and worse with twisting the wrist (pronation/supination).  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  Reviewed previous MRI showing partial tear of the rotator cuff as well as irritation of the biceps tendon.  I do think there could be a couple things causing his pain.  Plan to treat as below follow-up if not improving.  Image Findings: Reviewed previous shoulder x-ray and MRI and elbow x-rays  Treatment: Activities as tolerated, Continue home exercises   Medications/Injections: Limited tylenol/ibuprofen for pain for 1-2 weeks, right subacromial and right elbow joint steroid injection   Follow-up: In one month if symptoms do not improve, sooner if worsening  Can consider shoulder joint injection versus further testing for numbness tingling in the arm, can consider procedure for shoulder calcification if not improving as well.    Please call [phone]   Ask for my team if you have any questions or concerns    If you have not yet received the influenza vaccine but would like to get one, please call  [phone] or you can schedule via [employer]    It was great seeing you again today!    Gasper Davis MD, CAQSM     FSOC Injection Discharge Instructions    Procedure: right subacromial and right elbow joint steroid injections    You may shower, however avoid swimming, tub  baths or hot tubs for 24 hours following your procedure  You may have a mild to moderate increase in pain for several days following the injection.  It may take up to 14 days for the steroid medication to start working although you may feel the effect as early as a few days after the procedure.  You may use ice packs for 10-15 minutes, 3 to 4 times a day at the injection site for comfort  You may use anti-inflammatory medications (such as Ibuprofen or Aleve or Advil) or Tylenol for pain control if necessary  If you were fasting, you may resume your normal diet and medications after the procedure  If you have diabetes, check your blood sugar more frequently than usual as your blood sugar may be higher than normal for 10-14 days following a steroid injection. Contact your doctor who manages your diabetes if your blood sugar is higher than usual    If you experience any of the following, call [facility] @ [phone] or [phone]  -Fever over 100 degree F  -Swelling, bleeding, redness, drainage, warmth at the injection site  - New or worsening pain

## 2022-05-19 RX ORDER — BETAMETHASONE SODIUM PHOSPHATE AND BETAMETHASONE ACETATE 3; 3 MG/ML; MG/ML
6 INJECTION, SUSPENSION INTRA-ARTICULAR; INTRALESIONAL; INTRAMUSCULAR; SOFT TISSUE
Status: SHIPPED | OUTPATIENT
Start: 2022-05-16

## 2022-05-19 RX ORDER — ROPIVACAINE HYDROCHLORIDE 5 MG/ML
4 INJECTION, SOLUTION EPIDURAL; INFILTRATION; PERINEURAL
Status: SHIPPED | OUTPATIENT
Start: 2022-05-16

## 2022-05-19 RX ORDER — ROPIVACAINE HYDROCHLORIDE 5 MG/ML
2 INJECTION, SOLUTION EPIDURAL; INFILTRATION; PERINEURAL
Status: SHIPPED | OUTPATIENT
Start: 2022-05-16

## 2022-06-01 DIAGNOSIS — I10 BENIGN ESSENTIAL HYPERTENSION: ICD-10-CM

## 2022-06-01 RX ORDER — LISINOPRIL 40 MG/1
40 TABLET ORAL DAILY
Qty: 90 TABLET | Refills: 3 | Status: SHIPPED | OUTPATIENT
Start: 2022-06-01

## 2022-07-01 ENCOUNTER — OFFICE VISIT (OUTPATIENT)
Dept: ORTHOPEDICS | Facility: CLINIC | Age: 72
End: 2022-07-01
Payer: COMMERCIAL

## 2022-07-01 VITALS — DIASTOLIC BLOOD PRESSURE: 82 MMHG | SYSTOLIC BLOOD PRESSURE: 124 MMHG

## 2022-07-01 DIAGNOSIS — M54.12 CERVICAL RADICULOPATHY: ICD-10-CM

## 2022-07-01 DIAGNOSIS — G89.29 CHRONIC RIGHT SHOULDER PAIN: Primary | ICD-10-CM

## 2022-07-01 DIAGNOSIS — M25.511 CHRONIC RIGHT SHOULDER PAIN: Primary | ICD-10-CM

## 2022-07-01 PROCEDURE — 99213 OFFICE O/P EST LOW 20 MIN: CPT | Mod: 25 | Performed by: FAMILY MEDICINE

## 2022-07-01 PROCEDURE — 20611 DRAIN/INJ JOINT/BURSA W/US: CPT | Mod: RT | Performed by: FAMILY MEDICINE

## 2022-07-01 RX ADMIN — BETAMETHASONE SODIUM PHOSPHATE AND BETAMETHASONE ACETATE 6 MG: 3; 3 INJECTION, SUSPENSION INTRA-ARTICULAR; INTRALESIONAL; INTRAMUSCULAR; SOFT TISSUE at 13:47

## 2022-07-01 RX ADMIN — ROPIVACAINE HYDROCHLORIDE 4 ML: 5 INJECTION, SOLUTION EPIDURAL; INFILTRATION; PERINEURAL at 13:47

## 2022-07-01 NOTE — PROGRESS NOTES
ASSESSMENT & PLAN    Agustin was seen today for pain.    Diagnoses and all orders for this visit:    Chronic right shoulder pain  -     Large Joint Injection/Arthocentesis: R glenohumeral joint    Cervical radiculopathy        # Right Shoulder Pain/Cervical Radiculopathy: Longstanding condition 10+ years for patient with pain over shoulder going to the elbow.  He has had subacromial injections as well as biceps tendon injections with pain persisting. Previous elbow steroid injection help significantly. He still having pain in his neck going down his arm radiating all way down to his hand. He reports the numbness and tingling is bothersome but the pain is debilitating. Reviewed previous cervical and shoulder MRIs showing disc herniation's and cervical region as well as rotator cuff and general degeneration of the right shoulder. I do think he has pain coming from his shoulder and his neck. Given this will treat with the shoulder joint injection today and consider follow-up with pain for possible epidural steroid injection if symptoms persist. Plan otherwise as below. Image Findings: Reviewed previous shoulder x-ray and shoulder and cervical MRIs and elbow x-rays  Treatment: Activities as tolerated, Continue home exercises   Medications/Injections: Limited tylenol/ibuprofen for pain for 1-2 weeks, right shoulder joint steroid injection  Follow-up: In one month if symptoms do not improve, sooner if worsening  Can consider cervical epidural steroid injection    -----    SUBJECTIVE:  Agustin Chaudhry is a 72 year old male who is seen in follow-up for right shoulder pain. They were last seen 5/16/2022 and right subacromial and right elbow.  The patient is seen by themselves.    Since their last visit reports improved right elbow pain and persisting right shoulder pain. Primary area of pain is into the R biceps and anterior shoulder. Aggravated by any movement and occasionally uses his L arm to support/move it. Also has hx of  pain radiating to his neck radiating to lateral upper arm.  Longstanding numbness extending from neck to R thumb. They indicate that their current pain level is 9/10. They have tried HEP.        Patient's past medical, surgical, social, and family histories were reviewed today and no changes are noted.    REVIEW OF SYSTEMS:  Constitutional: NEGATIVE for fever, chills, change in weight  Skin: NEGATIVE for worrisome rashes, moles or lesions  GI/: NEGATIVE for bowel or bladder changes  Neuro: NEGATIVE for weakness, dizziness or paresthesias    OBJECTIVE:  /82    General: healthy, alert and in no distress  HEENT: no scleral icterus or conjunctival erythema  Skin: no suspicious lesions or rash. No jaundice.  CV: regular rhythm by palpation, no pedal edema  Resp: normal respiratory effort without conversational dyspnea   Psych: normal mood and affect  Gait: normal steady gait with appropriate coordination and balance  Neuro: normal light touch sensory exam of the extremities.    MSK:    RIGHT SHOULDER  Inspection:    no swelling, bruising, discoloration, or obvious deformity or asymmetry and moderate muscle atrophy of posterior shoulder musculature  Palpation:    Tender about the posteriorly along GH joint. Remainder of bony and tendinous landmarks are nontender.  Active Range of Motion:     Abduction 900, FF 1350, , IR T12 overall limited by pain  Strength:    Scapular plane abduction 5/5, painful,  ER 5/5, IR 5/5, biceps 5/5, triceps 5/5.   Special Tests:    Positive: Floyd', supraspinatus (empty can), crossed arm adduction, Macomb's and Speed's    Negative: crossed arm adduction and lift-off    CERVICAL SPINE  Inspection:    normal cervical lordosis present, rounded shoulders, forward head posture  Palpation:    Nontender.  Range of Motion:     Flexion full    Extension limited    Right side bend limited    Left side bend full    Right rotation very limited    Left rotation limited  Strength:    Full  strength throughout all neck muscles  Special Tests:    Positive: Right Spurlings produces pain that does not pass elbow joint    Negative: Left Spurling's        Independent visualization of the below image:  EXAM: MR SHOULDER RIGHT W/O CONTRAST  LOCATION: [facility]  DATE/TIME: 12/29/2021 8:05 AM     INDICATION: Shoulder pain, rotator cuff disorder suspected, x-ray done.  COMPARISON: 12/20/2021 radiograph.  TECHNIQUE: Unenhanced.     FINDINGS:     ROTATOR CUFF:  -Supraspinatus: There is a high-grade tear involving the distal supraspinatus tendon with both articular and bursal-sided components measuring approximately 9 mm in AP dimension. Moderate underlying tendinopathy. No retraction. Muscle bulk is normal.     -Infraspinatus: Advanced infraspinatus tendinopathy with chronic appearing cystic change along the undersurface of the myotendinous junction, likely due to a low-grade articular-sided delaminating tear. No high-grade or retracted tear. Muscle bulk is   normal.     -Subscapularis: Moderate subscapularis tendinopathy with a superimposed mild to moderate grade distal tear of the cranial fibers. No retraction. Muscle bulk is normal.  -Teres minor: Partial fatty atrophy of teres minor. No mass in the quadrilateral space. Tendon is intact.     CORACOACROMIAL ARCH:  -Morphology: Type II acromion. Shallow subacromial spur.   -Bursa: Small amount of fluid in the subacromial subdeltoid bursa.     ACROMIOCLAVICULAR JOINT:   -Moderate AC joint arthrosis.      LONG HEAD OF BICEPS TENDON:   -Attritional thinning and chronic partial tear of the biceps tendon. There is fluid in the tendon sheath.     GLENOHUMERAL JOINT:   -Labrum: No displaced labral tear or paralabral cyst.   -Cartilage: Generalized articular cartilage thinning most pronounced along superior medial humeral head. Glenoid articulation shows no high-grade defects.   -Joint space: No effusion or synovitis.  -Glenohumeral ligaments  and capsule: There is increased soft tissue and thickening within the rotator interval. Mild thickening and pericapsular edema about the axillary pouch.     BONES:   -No fracture or concerning marrow replacing lesion.     SOFT TISSUES:   -Normal deltoid muscle bulk. Normal visualized chest wall and axilla.                                                                      IMPRESSION:  1.  High-grade distal supraspinatus tendon tear measuring 9 mm in AP dimension. No retraction.     2.  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 Small amount of cystic fluid at the myotendinous junction.     3.  Partial-thickness tear distal, cranial fibers of the subscapularis tendon.     4.  Chronic-appearing attritional thinning and partial tear of the biceps tendon.     5.  Glenohumeral synovitis and capsulitis.     6.  Mild subacromial/subdeltoid bursitis.     7.  No fracture.      ELBOW THREE VIEWS RIGHT  5/16/2022 1:30 PM      HISTORY: Right elbow pain  COMPARISON: None.                                                                      IMPRESSION: No acute fracture or malalignment. Severe elbow joint  degenerative changes. No significant elbow joint effusion.     MD Gasper LANTIGUA MD, [facility]    Disclaimer: This note consists of symbols derived from keyboarding, dictation and/or voice recognition software. As a result, there may be errors in the script that have gone undetected. Please consider this when interpreting information found in this chart.    Large Joint Injection/Arthocentesis: R glenohumeral joint    Date/Time: 7/1/2022 1:47 PM  Performed by: Gasper Davis MD  Authorized by: Gasper Davis MD     Indications:  Pain  Needle Size:  25 G  Guidance: ultrasound    Approach:  Posterolateral  Location:  Shoulder      Site:  R glenohumeral joint  Medications:  6 mg betamethasone acet & sod  phos 6 (3-3) MG/ML; 4 mL ropivacaine 5 MG/ML  Outcome:  Tolerated well, no immediate complications  Procedure discussed: discussed risks, benefits, and alternatives    Consent Given by:  Patient  Timeout: timeout called immediately prior to procedure    Prep: patient was prepped and draped in usual sterile fashion     Patient reported significant improvement of pain after the numbing portion right glenohumeral joint steroid injection.  Ultrasound guided images were permanently stored.   Aftercare instructions given to patient.  Plan to follow-up as discussed above.     Gasper Davis MD [facility]

## 2022-07-01 NOTE — PATIENT INSTRUCTIONS
# Right Shoulder Pain/Cervical Radiculopathy: Longstanding condition 10+ years for patient with pain over shoulder going to the elbow.  He has had subacromial injections as well as biceps tendon injections with pain persisting. Previous elbow steroid injection help significantly. He still having pain in his neck going down his arm radiating all way down to his hand. He reports the numbness and tingling is bothersome but the pain is debilitating. Reviewed previous cervical and shoulder MRIs showing disc herniation's and cervical region as well as rotator cuff and general degeneration of the right shoulder. I do think he has pain coming from his shoulder and his neck. Given this will treat with the shoulder joint injection today and consider follow-up with pain for possible epidural steroid injection if symptoms persist. Plan otherwise as below. Image Findings: Reviewed previous shoulder x-ray and shoulder and cervical MRIs and elbow x-rays  Treatment: Activities as tolerated, Continue home exercises   Medications/Injections: Limited tylenol/ibuprofen for pain for 1-2 weeks, right shoulder joint steroid injection  Follow-up: In one month if symptoms do not improve, sooner if worsening  Can consider cervical epidural steroid injection    It was great seeing you again today!    Gasper Davis    [facility] Injection Discharge Instructions    Procedure: right shoulder joint steroid injection     You may shower, however avoid swimming, tub baths or hot tubs for 24 hours following your procedure  You may have a mild to moderate increase in pain for several days following the injection.  It may take up to 14 days for the steroid medication to start working although you may feel the effect as early as a few days after the procedure.  You may use ice packs for 10-15 minutes, 3 to 4 times a day at the injection site for comfort  You may use anti-inflammatory medications (such as Ibuprofen or Aleve or Advil) or Tylenol for pain  control if necessary  If you were fasting, you may resume your normal diet and medications after the procedure  If you have diabetes, check your blood sugar more frequently than usual as your blood sugar may be higher than normal for 10-14 days following a steroid injection. Contact your doctor who manages your diabetes if your blood sugar is higher than usual    If you experience any of the following, call [facility] @ [phone] or [phone]  -Fever over 100 degree F  -Swelling, bleeding, redness, drainage, warmth at the injection site  - New or worsening pain

## 2022-07-07 RX ORDER — ROPIVACAINE HYDROCHLORIDE 5 MG/ML
4 INJECTION, SOLUTION EPIDURAL; INFILTRATION; PERINEURAL
Status: SHIPPED | OUTPATIENT
Start: 2022-07-01

## 2022-07-07 RX ORDER — BETAMETHASONE SODIUM PHOSPHATE AND BETAMETHASONE ACETATE 3; 3 MG/ML; MG/ML
6 INJECTION, SUSPENSION INTRA-ARTICULAR; INTRALESIONAL; INTRAMUSCULAR; SOFT TISSUE
Status: SHIPPED | OUTPATIENT
Start: 2022-07-01

## 2022-10-21 ENCOUNTER — IMMUNIZATION (OUTPATIENT)
Dept: FAMILY MEDICINE | Facility: CLINIC | Age: 72
End: 2022-10-21
Payer: COMMERCIAL

## 2022-10-21 VITALS — TEMPERATURE: 97.5 F

## 2022-10-21 DIAGNOSIS — Z23 NEED FOR PROPHYLACTIC VACCINATION AND INOCULATION AGAINST INFLUENZA: ICD-10-CM

## 2022-10-21 DIAGNOSIS — Z23 HIGH PRIORITY FOR 2019-NCOV VACCINE: ICD-10-CM

## 2022-10-21 PROCEDURE — 91312 COVID-19,PF,PFIZER BOOSTER BIVALENT: CPT

## 2022-10-21 PROCEDURE — 90662 IIV NO PRSV INCREASED AG IM: CPT

## 2022-10-21 PROCEDURE — 0124A COVID-19,PF,PFIZER BOOSTER BIVALENT: CPT

## 2022-10-21 PROCEDURE — G0008 ADMIN INFLUENZA VIRUS VAC: HCPCS

## 2022-11-28 DIAGNOSIS — I10 BENIGN ESSENTIAL HYPERTENSION: ICD-10-CM

## 2022-11-29 RX ORDER — LISINOPRIL 40 MG/1
40 TABLET ORAL DAILY
Qty: 90 TABLET | Refills: 3 | OUTPATIENT
Start: 2022-11-29

## 2022-11-29 NOTE — TELEPHONE ENCOUNTER
Patient should still have 6 months worth of lisinopril. Patient should also return to clinic for repeat labs.    Helena Pickett MD, PGY2  [facility]

## 2022-11-30 NOTE — TELEPHONE ENCOUNTER
Patient was called and notified.  He is scheduled to come see you on next Monday 12/05/2022 at 8:00am.  Thank you.  ANGELICA Macias

## 2022-12-05 ENCOUNTER — OFFICE VISIT (OUTPATIENT)
Dept: FAMILY MEDICINE | Facility: CLINIC | Age: 72
End: 2022-12-05
Payer: COMMERCIAL

## 2022-12-05 VITALS
HEART RATE: 65 BPM | BODY MASS INDEX: 21.44 KG/M2 | DIASTOLIC BLOOD PRESSURE: 73 MMHG | SYSTOLIC BLOOD PRESSURE: 133 MMHG | WEIGHT: 126.8 LBS | TEMPERATURE: 98.2 F | OXYGEN SATURATION: 97 % | RESPIRATION RATE: 16 BRPM

## 2022-12-05 DIAGNOSIS — M47.22 OSTEOARTHRITIS OF SPINE WITH RADICULOPATHY, CERVICAL REGION: Primary | ICD-10-CM

## 2022-12-05 DIAGNOSIS — M47.22 OSTEOARTHRITIS OF SPINE WITH RADICULOPATHY, CERVICAL REGION: ICD-10-CM

## 2022-12-05 DIAGNOSIS — M75.111 INCOMPLETE ROTATOR CUFF TEAR OR RUPTURE OF RIGHT SHOULDER, NOT SPECIFIED AS TRAUMATIC: Primary | ICD-10-CM

## 2022-12-05 PROCEDURE — 99214 OFFICE O/P EST MOD 30 MIN: CPT | Mod: GC

## 2022-12-05 RX ORDER — DULOXETIN HYDROCHLORIDE 60 MG/1
CAPSULE, DELAYED RELEASE ORAL
Qty: 90 CAPSULE | Refills: 0 | Status: SHIPPED | OUTPATIENT
Start: 2022-12-05 | End: 2022-12-05

## 2022-12-05 RX ORDER — DULOXETIN HYDROCHLORIDE 30 MG/1
30 CAPSULE, DELAYED RELEASE ORAL DAILY
Qty: 7 CAPSULE | Refills: 0 | Status: SHIPPED | OUTPATIENT
Start: 2022-12-05 | End: 2022-12-29

## 2022-12-05 RX ORDER — DULOXETIN HYDROCHLORIDE 60 MG/1
60 CAPSULE, DELAYED RELEASE ORAL DAILY
Qty: 90 CAPSULE | Refills: 3 | Status: SHIPPED | OUTPATIENT
Start: 2022-12-12 | End: 2023-12-12

## 2022-12-05 RX ORDER — CELECOXIB 200 MG/1
200 CAPSULE ORAL DAILY
Qty: 30 CAPSULE | Refills: 0 | Status: SHIPPED | OUTPATIENT
Start: 2022-12-05 | End: 2022-12-29

## 2022-12-05 ASSESSMENT — PATIENT HEALTH QUESTIONNAIRE - PHQ9: SUM OF ALL RESPONSES TO PHQ QUESTIONS 1-9: 8

## 2022-12-05 NOTE — PROGRESS NOTES
[facility] Visit    Assessment & Plan   Agustin Chaudhry is a 72 year old male with the past medical history of anxiety, depression, PTSD, hypertension, and GERD. He presents to [facility] for discussion of ongoing pain from neck down to fingertips.    Incomplete rotator cuff tear or rupture of right shoulder, not specified as traumatic  Osteoarthritis of spine with radiculopathy, cervical region  Patient has been experiencing 10 or more years of right shoulder pain.  Currently, he has been seeing sports medicine who has been doing multiple injections in the subacromial, bicep tendon, and elbow areas.  Patient states that he has had little relief with these.  Last seen on 7/1/2022.  He also states he has had little relief with his medical marijuana program, gabapentin, and other medications.  He states that he has never tried Cymbalta.  However, he was prescribed it in 1/2021.  Completed physical therapy in 2016 and would not like to return.  He also received a cervical spine steroid injection when first experiencing pain, which she states was very painful and had no relief. He believes this part of his ongoing pain.  He would not like to repeat cervical spine steroid injection.  Patient is very frustrated with the amount of pain that he has been in over these years.  He is also frustrated that he has had to pay large amount of money for his injections and marijuana.  He states his pain is debilitating.  He states he is unable to hold a cup or do work around the house.  He has to take a shower in the morning in order to help with his full body pains.  With MRIs of his shoulder and cervical spine (was completed about 1 year ago) showing abnormal pathology for degeneration of the cervical spine and right shoulder rotator cuff tear, I recommend following with orthopedic surgery specialists.  Patient is informed that he may need repeat MRIs of both the shoulder and cervical spine for further  evaluation.  He is also informed that he may need an EMG to figure out severity of nerve damage.  - Orthopedic  Referral for Spine; Future  - Orthopedic  Referral for Shoulder; Future  - celecoxib (CELEBREX) 200 MG capsule; Take 1 capsule (200 mg) by mouth daily  - DULoxetine (CYMBALTA) 60 MG capsule; Take 30 mg (half pill) for 1 week and then 60 mg (full pill) from then on for nerve pain and mood.    Return for when needed.        Patient was staffed with supervising physician, Dr. Suzanna Matta.    Helena Pickett MD, PGY2  [facility]   Agustin Chaudhry is a 72 year old male with the past medical history of anxiety, depression, PTSD, hypertension, and GERD. He presents to [facility] for discussion of ongoing pain from neck down to fingertips.    HPI  Patient has been experiencing right shoulder pain for the last 10 or more years. He is currently seeing sports medicine in [place] where he has received subacromial and bicep tendon injections. Dr. Davis, sports medicine, believes patients pain is coming from the neck and shoulder according to the note on 7/1/2022. They are considering a cervical epidural steroid injection.  However, patient states he has had 1 of these in the past which only caused more pain.  He is not interested in getting one now.  He states he would like to have all of his pain go away and if that is surgery then fine.  He has completed 6 months on medical marijuana which has not helped with pain. He took gabapentin for about one year, and he states it did not work. Voltaren gen also did not help. Patient was prescribed cymbalta in 1/2022, but he does not recall taking cymbalta in the past. He is very frustrated with the amount of pain he is in. He believes nothing he has done has helped with the pain.     Patient also endorses full body aches daily. It is most prominent in his hand and shoulders. He must take a hot  "shower in the morning to help with the pain. He will have some numbness and tingling from his shoulder to fingertips.  However, he states the pain is the worst.    Cervical MRI last completed on 11/23/2021.  Last shoulder MRI was completed on 12/29/2021.  Cervical MRI showing mild progression of degenerative changes in the cervical spine with decreased disc height at some levels.  Right shoulder MRI shows rotator cuff and general degeneration of the shoulder.      Review of Systems   - Remaining 10 point system is negative other than what is noted in the HPI.    Objective   /73 (BP Location: Left arm, Patient Position: Sitting, Cuff Size: Adult Regular)   Pulse 65   Temp 98.2  F (36.8  C) (Oral)   Resp 16   Wt 57.5 kg (126 lb 12.8 oz)   SpO2 97%   BMI 21.44 kg/m    Body mass index is 21.44 kg/m .    General appearance: alert, frustrated, appears stated age  Eyes: conjunctivae/corneas clear, EOM intact  Ears: hearing grossly intact  Lung: able to speak in full sentences, no wheezing, coughing, or use of accessory muscles  Musculoskeletal: ambulatory, spontaneously moving upper and lower extremities with endorsed pain  Neurologic: 4/5  strength bilaterally, 4/5 okay sign breaking apart bilaterally, 4/5 paper test between thumb and index finger bilaterally, 4/5 finger abduction bilaterally  Psychologic: frustrated with the medical system and amount of pain he is in, apologetic for \"unloading\" on physician with frustrations     MRI of cervical spine reviewed.  MRI of right shoulder was reviewed.    ----- Service Performed and Documented by Resident or Fellow ------        "

## 2022-12-05 NOTE — PROGRESS NOTES
Preceptor Attestation:  Vitals:    12/05/22 0806   BP: 133/73   BP Location: Left arm   Patient Position: Sitting   Cuff Size: Adult Regular   Pulse: 65   Resp: 16   Temp: 98.2  F (36.8  C)   TempSrc: Oral   SpO2: 97%   Weight: 57.5 kg (126 lb 12.8 oz)          I discussed the patient with the resident and evaluated the patient in person. I have verified the content of the note, which accurately reflects my assessment of the patient and the plan of care.   Supervising Physician:  Suzanna Matta MD

## 2022-12-05 NOTE — PATIENT INSTRUCTIONS
Thank you for coming to see me today in clinic!    Today we discussed:  - Pain:  Start taking Cymbalta daily.  He will start at 30 mg for 1 week and then increase to 60 mg daily.  This may be taken at anytime of the day.  Please make sure to continuously take it every single day for relief of pain.  While waiting for Cymbalta to fully kick in, please take Celebrex daily for the first month.  This is a stronger NSAID that will help with pain.  -There are 2 referrals out for orthopedic surgery.  One is to spine.  The other is to shoulder.  They will call you to schedule the appointment.    If you have any further questions, please call the clinic!    Have a wonderful rest of your day!

## 2022-12-07 ENCOUNTER — TELEPHONE (OUTPATIENT)
Dept: FAMILY MEDICINE | Facility: CLINIC | Age: 72
End: 2022-12-07

## 2022-12-07 NOTE — TELEPHONE ENCOUNTER
Received pt complaint.    Pt states he was supposed to receive 2 referrals. One orthopedic referral for his shoulder cuff and another for his spine. Pt also expressed his frustration having to come back in for an appointment to refill his medications.     When pt received call to schedule his orthopedic referrals, he was told the order for his spine is incorrect and will need to a new referral to be placed. He then was transferred multiple times and was on hold for quite a long time.     Apologized to patient and told patient that writer will send an urgent message to Dr. Pickett to place the spine referral. Pt is scheduled in [place] for his shoulder cuff referral on 12/13, but rather go somewhere closer to his home and if possible, be seen at the same clinic for both referrals.     Told pt an urgent message will be sent to Dr. Pickett to request for the spine referral and will ask Referral Coordinator to assist with scheduling. Pt states he is available any time.       Ann-Marie Astudillo on 12/7/2022 at 2:43 PM

## 2022-12-07 NOTE — TELEPHONE ENCOUNTER
Hello,     Please see original message below.     Dr. Pickett, -  Can a spine referral be placed for pt?    Gretchen - Once referral is placed, can you please assist pt with scheduling? Pt states if the appt for his shoulder is sooner than Tuesday, can cancel his appt in [place], but if not, he would like to keep his appt in [place] and ok to see anywhere closer to home.       Thank you both in advance.     Ann-Marie Astudillo on 12/7/2022 at 2:47 PM

## 2022-12-15 ENCOUNTER — TRANSFERRED RECORDS (OUTPATIENT)
Dept: HEALTH INFORMATION MANAGEMENT | Facility: CLINIC | Age: 72
End: 2022-12-15

## 2022-12-29 ENCOUNTER — OFFICE VISIT (OUTPATIENT)
Dept: FAMILY MEDICINE | Facility: CLINIC | Age: 72
End: 2022-12-29
Payer: COMMERCIAL

## 2022-12-29 VITALS
SYSTOLIC BLOOD PRESSURE: 112 MMHG | HEART RATE: 66 BPM | OXYGEN SATURATION: 97 % | WEIGHT: 125.6 LBS | DIASTOLIC BLOOD PRESSURE: 65 MMHG | TEMPERATURE: 98.2 F | BODY MASS INDEX: 21.23 KG/M2 | RESPIRATION RATE: 16 BRPM

## 2022-12-29 DIAGNOSIS — N18.1 CHRONIC KIDNEY DISEASE, STAGE 1: ICD-10-CM

## 2022-12-29 DIAGNOSIS — Z13.220 SCREENING FOR HYPERLIPIDEMIA: ICD-10-CM

## 2022-12-29 DIAGNOSIS — I10 PRIMARY HYPERTENSION: ICD-10-CM

## 2022-12-29 DIAGNOSIS — Z01.818 PREOP GENERAL PHYSICAL EXAM: Primary | ICD-10-CM

## 2022-12-29 LAB
ALBUMIN SERPL BCG-MCNC: 4.3 G/DL (ref 3.5–5.2)
ALP SERPL-CCNC: 67 U/L (ref 40–129)
ALT SERPL W P-5'-P-CCNC: 7 U/L (ref 10–50)
ANION GAP SERPL CALCULATED.3IONS-SCNC: 11 MMOL/L (ref 7–15)
AST SERPL W P-5'-P-CCNC: 19 U/L (ref 10–50)
BILIRUB SERPL-MCNC: 0.2 MG/DL
BUN SERPL-MCNC: 38.8 MG/DL (ref 8–23)
CALCIUM SERPL-MCNC: 9.8 MG/DL (ref 8.8–10.2)
CHLORIDE SERPL-SCNC: 104 MMOL/L (ref 98–107)
CHOLEST SERPL-MCNC: 210 MG/DL
CREAT SERPL-MCNC: 1.59 MG/DL (ref 0.67–1.17)
DEPRECATED HCO3 PLAS-SCNC: 23 MMOL/L (ref 22–29)
ERYTHROCYTE [DISTWIDTH] IN BLOOD BY AUTOMATED COUNT: 13.7 % (ref 10–15)
GFR SERPL CREATININE-BSD FRML MDRD: 46 ML/MIN/1.73M2
GLUCOSE SERPL-MCNC: 89 MG/DL (ref 70–99)
HCT VFR BLD AUTO: 37.1 % (ref 40–53)
HDLC SERPL-MCNC: 42 MG/DL
HGB BLD-MCNC: 12.5 G/DL (ref 13.3–17.7)
LDLC SERPL CALC-MCNC: 132 MG/DL
MCH RBC QN AUTO: 29.1 PG (ref 26.5–33)
MCHC RBC AUTO-ENTMCNC: 33.7 G/DL (ref 31.5–36.5)
MCV RBC AUTO: 86 FL (ref 78–100)
NONHDLC SERPL-MCNC: 168 MG/DL
PLATELET # BLD AUTO: 173 10E3/UL (ref 150–450)
POTASSIUM SERPL-SCNC: 4.7 MMOL/L (ref 3.4–5.3)
PROT SERPL-MCNC: 6.8 G/DL (ref 6.4–8.3)
RBC # BLD AUTO: 4.3 10E6/UL (ref 4.4–5.9)
SODIUM SERPL-SCNC: 138 MMOL/L (ref 136–145)
TRIGL SERPL-MCNC: 178 MG/DL
WBC # BLD AUTO: 6.5 10E3/UL (ref 4–11)

## 2022-12-29 PROCEDURE — 80061 LIPID PANEL: CPT

## 2022-12-29 PROCEDURE — 85027 COMPLETE CBC AUTOMATED: CPT

## 2022-12-29 PROCEDURE — 80053 COMPREHEN METABOLIC PANEL: CPT

## 2022-12-29 PROCEDURE — 99214 OFFICE O/P EST MOD 30 MIN: CPT | Mod: GC

## 2022-12-29 PROCEDURE — 36415 COLL VENOUS BLD VENIPUNCTURE: CPT

## 2022-12-29 ASSESSMENT — PATIENT HEALTH QUESTIONNAIRE - PHQ9: SUM OF ALL RESPONSES TO PHQ QUESTIONS 1-9: 3

## 2022-12-29 NOTE — PROGRESS NOTES
[facility] [address] [place] [address]  Phone: [phone]  Fax: [phone]  Primary Provider: Oz Simons  Pre-op Performing Provider: OZ SIMONS  [id number]}  PREOPERATIVE EVALUATION:  Today's date: 12/29/2022    Agustin Chaudhry is a 72 year old male who presents for a preoperative evaluation.    Surgical Information:  Surgery/Procedure: Right rotator cuff repair  Surgery Location: [facility]  Surgeon: Dr. Ramirez  Surgery Date: 01/16/2023  Time of Surgery: afternoon  Where patient plans to recover: At home with support group of friends  Fax number for surgical facility: Please fax if able    Type of Anesthesia Anticipated: TBD    Assessment & Plan     The proposed surgical procedure is considered INTERMEDIATE risk.    Preop general physical exam  Patient is scheduled for right rotator cuff repair on 1/16/2022 with [facility]. Patient is low risk for procedure. Will check a baseline creatinine and hemoglobin today for patient.  Advised not to take Celebrex or any aspirin 5 days before procedure.  - Encouraged to follow directions about surgery from [facility].  - Comprehensive metabolic panel  - CBC with platelets    Chronic kidney disease, stage 1  Last creatinine was 1.6 one year ago. Will recheck today.  - Comprehensive metabolic panel    Primary Hypertension  Well controlled with lisinopril 40mg. Will continue.     Screening for hyperlipidemia  Last screened about 4 years ago. Will check today due to having labs already drawn.  - Lipid panel reflex to direct LDL Non-fasting      Risks and Recommendations:  The patient has the following additional risks and recommendations for perioperative complications:   - No identified additional risk factors other than previously addressed    Medication Instructions:  Patient is to take all scheduled medications on the day of surgery    RECOMMENDATION:  APPROVAL GIVEN to proceed with proposed procedure, without  further diagnostic evaluation.      Subjective     HPI related to upcoming procedure: Patient states his doesn't remember the exact day and time he injuried his right shoulder but believes it occurred in the 1980s due to repetitive movements of pulling his bow back. He states he did not have it surgically fixed due to caring for his mother and father before passing. He has also completed conservative treatment of physical therapy, medical marijuana, subacromial bicep tendon injections, and multiple medications including gabapentin. MRI completed about one year ago shows rotator cuff tear.    Preop Questions 12/29/2022   1. Have you ever had a heart attack or stroke? No   2. Have you ever had surgery on your heart or blood vessels, such as a stent placement, a coronary artery bypass, or surgery on an artery in your head, neck, heart, or legs? No   3. Do you have chest pain with activity? No   4. Do you have a history of  heart failure? No   5. Do you currently have a cold, bronchitis or symptoms of other infection? No   6. Do you have a cough, shortness of breath, or wheezing? No   7. Do you or anyone in your family have previous history of blood clots? No   8. Do you or does anyone in your family have a serious bleeding problem such as prolonged bleeding following surgeries or cuts? No   9. Have you ever had problems with anemia or been told to take iron pills? No   10. Have you had any abnormal blood loss such as black, tarry or bloody stools? No   11. Have you ever had a blood transfusion? No   12. Are you willing to have a blood transfusion if it is medically needed before, during, or after your surgery? YES   13. Have you or any of your relatives ever had problems with anesthesia? No   14. Do you have sleep apnea, excessive snoring or daytime drowsiness? No   15. Do you have any artifical heart valves or other implanted medical devices like a pacemaker, defibrillator, or continuous glucose monitor? No   16. Do  you have artificial joints? No   17. Are you allergic to latex? No       Health Care Directive:  Patient does not have a Health Care Directive or Living Will: Living will with      Preoperative Review of :   reviewed - no record of controlled substances prescribed.    Status of Chronic Conditions:  RENAL INSUFFICIENCY - Patient has a longstanding history of moderate-severe chronic renal insufficiency. Last Cr 1.6 about 1 year ago. Rechecking at today's visit.       Review of Systems  Constitutional, neuro, ENT, endocrine, pulmonary, cardiac, gastrointestinal, genitourinary, musculoskeletal, integument and psychiatric systems are negative, except as otherwise noted.    Patient Active Problem List    Diagnosis Date Noted     Chronic kidney disease, stage 1 11/30/2021     Priority: Medium     Chronic pain syndrome 12/04/2020     Priority: Medium     Chronic neck pain 10/22/2018     Priority: Medium     Degeneration of cervical intervertebral disc 08/16/2018     Priority: Medium     Hypertension 07/27/2018     Priority: Medium     Moderate episode of recurrent major depressive disorder (H) 07/27/2018     Priority: Medium     Posttraumatic stress disorder 07/27/2018     Priority: Medium     Marginal corneal ulcer of left eye 03/01/2018     Priority: Medium     Pseudophakia, right eye 01/26/2017     Priority: Medium     Overview:   Pseudophakia, left eye, 2/16/17    Overview:   Pseudophakia, right eye- 1/26/17       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     Priority: Medium     Hepatitis C virus infection without hepatic coma 11/17/2015     Priority: Medium     Treated with Harvani       Crohn's disease in remission (H) 11/17/2015     Priority: Medium      Past Medical History:   Diagnosis Date     Anxiety      Arthritis      Chronic kidney disease, stage 1 11/30/2021     Depression      GERD (gastroesophageal reflux disease)      Heart murmur      Hepatitis C     treated in 2015     Hypertension  07/27/2018     Incomplete rotator cuff tear or rupture of right shoulder, not specified as traumatic      Infantile spasms with broad thumbs (H)      Liver disease      PTSD (post-traumatic stress disorder)      Rheumatic fever     child age     Past Surgical History:   Procedure Laterality Date     EYE SURGERY      x4 in both eyes     EYE SURGERY       HERNIA REPAIR  2014     HERNIA REPAIR       RELEASE TRIGGER FINGER Bilateral      thumb surgery      bilaterally     Current Outpatient Medications   Medication Sig Dispense Refill     DULoxetine (CYMBALTA) 60 MG capsule Take 1 capsule (60 mg) by mouth daily 90 capsule 3     lisinopril (ZESTRIL) 40 MG tablet Take 1 tablet (40 mg) by mouth daily 90 tablet 3       No Known Allergies     Social History     Tobacco Use     Smoking status: Never     Smokeless tobacco: Never   Substance Use Topics     Alcohol use: Not Currently     Family History   Problem Relation Age of Onset     Melanoma Mother      Cancer No family hx of      Coronary Artery Disease No family hx of      Diabetes No family hx of      Heart Disease No family hx of      History   Drug Use     Types: Marijuana         Objective     /65 (BP Location: Left arm, Patient Position: Sitting, Cuff Size: Adult Regular)   Pulse 66   Temp 98.2  F (36.8  C) (Oral)   Resp 16   Wt 57 kg (125 lb 9.6 oz)   SpO2 97%   BMI 21.23 kg/m      Physical Exam    GENERAL APPEARANCE: healthy, alert and no distress     EYES: EOMI,  PERRL     HENT: ear canals and TM's normal and nose and mouth without ulcers or lesions     NECK: no adenopathy, no asymmetry, masses, or scars and thyroid normal to palpation     RESP: lungs clear to auscultation - no rales, rhonchi or wheezes     CV: regular rates and rhythm, normal S1 S2, no S3 or S4 and no murmur, click or rub     ABDOMEN:  soft, nontender, no HSM or masses and bowel sounds normal     MS: extremities normal- no gross deformities noted, no evidence of inflammation in  joints, FROM in all extremities.     SKIN: no suspicious lesions or rashes     NEURO: Normal strength and tone, sensory exam grossly normal, mentation intact and speech normal     PSYCH: mentation appears normal. and affect normal/bright     LYMPHATICS: No cervical adenopathy    Recent Labs   Lab Test 11/30/21  1110 10/26/21  1545 02/08/21  1558   HGB  --   --  13.5    138 131.9*   POTASSIUM 4.6 4.4 4.4   CR 1.60 1.25 2.0*        Diagnostics:  Labs pending at this time.  Results will be reviewed when available.   No EKG required, no history of coronary heart disease, significant arrhythmia, peripheral arterial disease or other structural heart disease.    Revised Cardiac Risk Index (RCRI):  The patient has the following serious cardiovascular risks for perioperative complications:   - No serious cardiac risks = 0 points     RCRI Interpretation: 0 points: Class I (very low risk - 0.4% complication rate)        This patient was staffed with attending provider, Dr. Cleveland Lino, who agrees with the plan.    Signed Electronically by: Helena Pickett MD  Copy of this evaluation report is provided to requesting physician

## 2022-12-29 NOTE — PATIENT INSTRUCTIONS
Stop taking celebrex.  Continue taking 60mg duloxetine (cymbalta) daily.  Continue taking 40mg lisinopril daily.    For informational purposes only. Not to replace the advice of your health care provider. Copyright   ,  [employer] [facility]. All rights reserved. Clinically reviewed by Katelyn Parnell MD. [employer] [id number] - REV .  Preparing for Your Surgery  Getting started  A nurse will call you to review your health history and instructions. They will give you an arrival time based on your scheduled surgery time. Please be ready to share:  Your doctor's clinic name and phone number  Your medical, surgical, and anesthesia history  A list of allergies and sensitivities  A list of medicines, including herbal treatments and over-the-counter drugs  Whether the patient has a legal guardian (ask how to send us the papers in advance)     Preparing for surgery  Within 10 to 30 days of surgery: Have a pre-op exam (sometimes called an H&P, or History and Physical). This can be done at a clinic or pre-operative center.  If you're having a , you may not need this exam. Talk to your care team.  At your pre-op exam, talk to your care team about all medicines you take. If you need to stop any medicines before surgery, ask when to start taking them again.  We do this for your safety. Many medicines can make you bleed too much during surgery. Some change how well surgery (anesthesia) drugs work.  Call your insurance company to let them know you're having surgery. (If you don't have insurance, call [phone].)  Call your clinic if there's any change in your health. This includes signs of a cold or flu (sore throat, runny nose, cough, rash, fever). It also includes a scrape or scratch near the surgery site.  If you have questions on the day of surgery, call your hospital or surgery center.  Eating and drinking guidelines  For your safety: Unless your surgeon tells you otherwise, follow the guidelines  below.  Eat and drink as usual until 8 hours before you arrive for surgery. After that, no food or milk.  Drink clear liquids until 2 hours before you arrive. These are liquids you can see through, like water, Gatorade, and Propel Water. They also include plain black coffee and tea (no cream or milk), candy, and breath mints. You can spit out gum when you arrive.  If you drink alcohol: Stop drinking it the night before surgery.  If your care team tells you to take medicine on the morning of surgery, it's okay to take it with a sip of water.  Preventing infection  Shower or bathe the night before and morning of your surgery. Follow the instructions your clinic gave you. (If no instructions, use regular soap.)  Don't shave or clip hair near your surgery site. We'll remove the hair if needed.  Don't smoke or vape the morning of surgery. You may chew nicotine gum up to 2 hours before surgery. A nicotine patch is okay.  Note: Some surgeries require you to completely quit smoking and nicotine. Check with your surgeon.  Your care team will make every effort to keep you safe from infection. We will:  Clean our hands often with soap and water (or an alcohol-based hand rub).  Clean the skin at your surgery site with a special soap that kills germs.  Give you a special gown to keep you warm. (Cold raises the risk of infection.)  Wear special hair covers, masks, gowns and gloves during surgery.  Give antibiotic medicine, if prescribed. Not all surgeries need antibiotics.  What to bring on the day of surgery  Photo ID and insurance card  Copy of your health care directive, if you have one  Glasses and hearing aids (bring cases)  You can't wear contacts during surgery  Inhaler and eye drops, if you use them (tell us about these when you arrive)  CPAP machine or breathing device, if you use them  A few personal items, if spending the night  If you have . . .  A pacemaker, ICD (cardiac defibrillator) or other implant: Bring the ID  card.  An implanted stimulator: Bring the remote control.  A legal guardian: Bring a copy of the certified (court-stamped) guardianship papers.  Please remove any jewelry, including body piercings. Leave jewelry and other valuables at home.  If you're going home the day of surgery  You must have a responsible adult drive you home. They should stay with you overnight as well.  If you don't have someone to stay with you, and you aren't safe to go home alone, we may keep you overnight. Insurance often won't pay for this.  After surgery  If it's hard to control your pain or you need more pain medicine, please call your surgeon's office.  Questions?   If you have any questions for your care team, list them here: _________________________________________________________________________________________________________________________________________________________________________ ____________________________________ ____________________________________ ____________________________________

## 2023-01-18 ENCOUNTER — VIRTUAL VISIT (OUTPATIENT)
Dept: FAMILY MEDICINE | Facility: CLINIC | Age: 73
End: 2023-01-18
Payer: COMMERCIAL

## 2023-01-18 DIAGNOSIS — E78.5 HYPERLIPIDEMIA LDL GOAL <100: Primary | ICD-10-CM

## 2023-01-18 PROCEDURE — 99214 OFFICE O/P EST MOD 30 MIN: CPT | Mod: 95

## 2023-01-18 NOTE — PROGRESS NOTES
Agustin is a 72 year old who is being evaluated via a billable telephone visit.      What phone number would you like to be contacted at? [phone]  How would you like to obtain your AVS? Don't want it    Distant Location (provider location):  On-site    Assessment & Plan     Hyperlipidemia LDL goal <100  New diagnosis. ASCVD score of 19.7. Risks and benefits were discussed. Patient's largest concern is myopathies. Patient had shoulder surgery yesterday and would like to start the medication after he heals a bit. Through shared decision, it was decided to start the medication in 1 month or sooner if he is feeling better and follow-up with myself in 2 months.  - atorvastatin (LIPITOR) 40 MG tablet; Take 1 tablet (40 mg) by mouth daily for high cholesterol  Ways to lower your cholesterol:    Reducing the amount of total and saturated fat in your diet (whipped cream, fatty meats, milk, butter)    Losing weight    Getting regular aerobic exercise (walking, running/jogging, riding bike- endurance training)    Eating plenty of fruits and vegetables. A plant-based diet is an effective strategy to lowering LDL cholesterol.      Follow-up for hyperlipidemia and starting atorvastatin 40 mg in 2 months.      This patient was staffed with attending provider, Dr. Pradeep Saenz, who agrees with the plan.    Helena Pickett MD  [facility]    Lola Velez is a 72 year old, presenting for the following health issues:  RECHECK (Need follow up on test result per patient.  ), other (Have hard time sleeping and need something for that per patient.), and Medication Reconciliation (Reviewed.  )      HPI   Patient had right shoulder suregery yesterday, January 1/17/23. Surgery went well. Planned follow-up on January 23, 2023 for physical therapy and January 27, 2023 for post-op visit. After 6 weeks, he states he will be seen by spine orthopedics. He plans to take his splint off today and shower. He denies pain or  "nausea. He has a good appetite.    Patient states he is not sleeping well. Waking up to pee 5-6 times a night. Does not want medication for increased urination. He states his stream is strong. He states he sleeps in a dark room with black out curtains and \"blinders\" on.       Review of Systems   ROS is negative other than what is listed in the HPI.      Objective       Vitals: No vitals were obtained today due to virtual visit.    Physical Exam   General: In no acute distress  PSYCH: Alert, coherent  RESP: No cough, no audible wheezing, able to talk in full sentences  Remainder of exam unable to be completed due to telephone visits    The 10-year ASCVD risk score (Elida CERDA, et al., 2019) is: 19.7%    Values used to calculate the score:      Age: 72 years      Sex: Male      Is Non- : No      Diabetic: No      Tobacco smoker: No      Systolic Blood Pressure: 112 mmHg      Is BP treated: Yes      HDL Cholesterol: 42 mg/dL      Total Cholesterol: 210 mg/dL    Office Visit on 12/29/2022   Component Date Value Ref Range Status     Sodium 12/29/2022 138  136 - 145 mmol/L Final     Potassium 12/29/2022 4.7  3.4 - 5.3 mmol/L Final     Chloride 12/29/2022 104  98 - 107 mmol/L Final     Carbon Dioxide (CO2) 12/29/2022 23  22 - 29 mmol/L Final     Anion Gap 12/29/2022 11  7 - 15 mmol/L Final     Urea Nitrogen 12/29/2022 38.8 (H)  8.0 - 23.0 mg/dL Final     Creatinine 12/29/2022 1.59 (H)  0.67 - 1.17 mg/dL Final     Calcium 12/29/2022 9.8  8.8 - 10.2 mg/dL Final     Glucose 12/29/2022 89  70 - 99 mg/dL Final     Alkaline Phosphatase 12/29/2022 67  40 - 129 U/L Final     AST 12/29/2022 19  10 - 50 U/L Final     ALT 12/29/2022 7 (L)  10 - 50 U/L Final     Protein Total 12/29/2022 6.8  6.4 - 8.3 g/dL Final     Albumin 12/29/2022 4.3  3.5 - 5.2 g/dL Final     Bilirubin Total 12/29/2022 0.2  <=1.2 mg/dL Final     GFR Estimate 12/29/2022 46 (L)  >60 mL/min/1.73m2 Final    Effective December 21, 2021 eGFRcr in " adults is calculated using the 2021 CKD-EPI creatinine equation which includes age and gender (Rosa Maria et al., [facility], DOI: 10.1056/IRXPjf6863795)     WBC Count 12/29/2022 6.5  4.0 - 11.0 10e3/uL Final     RBC Count 12/29/2022 4.30 (L)  4.40 - 5.90 10e6/uL Final     Hemoglobin 12/29/2022 12.5 (L)  13.3 - 17.7 g/dL Final     Hematocrit 12/29/2022 37.1 (L)  40.0 - 53.0 % Final     MCV 12/29/2022 86  78 - 100 fL Final     MCH 12/29/2022 29.1  26.5 - 33.0 pg Final     MCHC 12/29/2022 33.7  31.5 - 36.5 g/dL Final     RDW 12/29/2022 13.7  10.0 - 15.0 % Final     Platelet Count 12/29/2022 173  150 - 450 10e3/uL Final     Cholesterol 12/29/2022 210 (H)  <200 mg/dL Final     Triglycerides 12/29/2022 178 (H)  <150 mg/dL Final     Direct Measure HDL 12/29/2022 42  >=40 mg/dL Final     LDL Cholesterol Calculated 12/29/2022 132 (H)  <=100 mg/dL Final     Non HDL Cholesterol 12/29/2022 168 (H)  <130 mg/dL Final       ----- Service Performed and Documented by Resident or Fellow ------        Phone call duration: 15 minutes and 36 minutes

## 2023-01-19 RX ORDER — ATORVASTATIN CALCIUM 40 MG/1
40 TABLET, FILM COATED ORAL DAILY
Qty: 90 TABLET | Refills: 0 | Status: SHIPPED | OUTPATIENT
Start: 2023-02-01

## 2023-01-20 NOTE — PROGRESS NOTES
Preceptor Attestation:   I talked to the patient on the phone. I have verified the content of the note, which accurately reflects my assessment of the patient and the plan of care.   Supervising Physician:  Pradeep Saenz DO.

## 2023-06-10 DIAGNOSIS — I10 BENIGN ESSENTIAL HYPERTENSION: ICD-10-CM

## 2023-09-08 RX ORDER — LISINOPRIL 40 MG/1
TABLET ORAL
Qty: 90 TABLET | Refills: 3 | OUTPATIENT
Start: 2023-09-08

## 2025-03-07 NOTE — PROGRESS NOTES
INITIAL VISIT NOTE           HISTORY OF PRESENT ILLNESS       Gary Johnson is a 31 year old male presenting for a chief complaint of right knee pain.  Its anterior pain.  It bothers him if he is been up on it a lot.  He just started therapy.  He had a steroid taper also which helped.  Nursing notes reviewed and accepted.    PAST MEDICAL, FAMILY AND SOCIAL HISTORY      No past medical history on file.   No past surgical history on file.   Social History     Occupational History    Not on file   Tobacco Use    Smoking status: Never    Smokeless tobacco: Never   Vaping Use    Vaping status: never used   Substance and Sexual Activity    Alcohol use: Yes     Alcohol/week: 10.0 standard drinks of alcohol     Types: 10 Standard drinks or equivalent per week    Drug use: Not Currently     Types: Marijuana    Sexual activity: Not on file      Current Outpatient Medications   Medication Sig Dispense Refill    predniSONE (DELTASONE) 20 MG tablet Take 1 tablet by mouth daily (with breakfast). 7 tablet 0    sertraline (ZOLOFT) 50 MG tablet Take 1 tablet by mouth daily.      propRANolol (INDERAL) 10 MG tablet Take 1 tablet by mouth in the morning and 1 tablet at noon and 1 tablet in the evening.      losartan (COZAAR) 50 MG tablet Take 1 tablet by mouth daily. 90 tablet 2    omeprazole (PrilOSEC) 20 MG capsule TAKE 1 CAPSULE BY MOUTH DAILY 90 capsule 0    EPINEPHrine (AUVI-Q) 0.15 MG/0.15ML Solution Auto-injector Inject 0.15 mLs into the muscle once as needed for Anaphylaxis. 1 each 0     No current facility-administered medications for this visit.      ALLERGIES:   Allergen Reactions    Bee ANAPHYLAXIS       REVIEW OF SYSTEMS          PHYSICAL EXAM      There were no vitals taken for this visit.   General:  Well-groomed, alert and oriented with normal affect.      Musculoskeletal: Right knee exam  He localizes his pain to the lateral retinacular area.  Skin is intact.   ASSESSMENT & PLAN    Agustin was seen today for pain and follow up.    Diagnoses and all orders for this visit:    Chronic right shoulder pain  -     Large Joint Injection/Arthocentesis: R subacromial bursa    Right elbow pain  -     XR Elbow Right G/E 3 Views; Future  -     Medium Joint Injection/Arthrocentesis: R elbow      # Right Shoulder Pain: Longstanding condition 10+ years for patient with pain over the anterior shoulder now going down to his elbow with also some numbness and tingling noted in his forearm to his hand.  He has had several cervical epidural steroid injections without significant improvement.  Last visit on 1/7/2022 subacromial steroid and biceps injection 2/25/22 was completed that helped his pain for about 4 weeks with pain returning.  On examination today there is pain over the anterior shoulder joint with symptoms worse rotator cuff testing.  He has pain with end elbow flexion and extension and worse with twisting the wrist (pronation/supination).  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  Reviewed previous MRI showing partial tear of the rotator cuff as well as irritation of the biceps tendon.  I do think there could be a couple things causing his pain.  Plan to treat as below follow-up if not improving.  Image Findings: Reviewed previous shoulder x-ray and MRI and elbow x-rays  Treatment: Activities as tolerated, Continue home exercises   Medications/Injections: Limited tylenol/ibuprofen for pain for 1-2 weeks, right subacromial and right elbow joint steroid injection   Follow-up: In one month if symptoms do not improve, sooner if worsening  Can consider shoulder joint injection versus further testing for numbness tingling in the arm, can consider procedure for shoulder calcification if not improving as well.  -----    SUBJECTIVE:  Agustin Chaudhry is a 71 year old male who is seen in follow-up for right shoulder pain.They were last seen  1/7/2022 and right subacromial steroid injection was performed.  Steroid injection provided 3 weeks of good relief.  The patient is seen by themselves.    Since their last visit reports returned right shoulder pain.  Pain in the anterior elbow, can go to the hand.  Has numbness/tingling going to hand.  Has had cervical MRI 11/23/21 showing degenerative changes C3/4, C6/7.    They indicate that their current pain level is 8/10. They have tried steroid injectgion.      Interim History - May 16, 2022  Since last visit on 2/25/22 patient has not been doing good. He states he is back where he started.  He last got an injection in his right shoulder and felt like that helped.  He states that his elbow is pretty bad todday.   In his last appointment he received an injection.  He has given me an extensive history regarding the number of injections he has gotten.  The last injection has lasted him about 2 months.  He is interested in discussing his elbow and maybe we should explore his right elbow.  He is having a lot of pain with turning a door knob, turning a screw , opening a jar.  He has not tried anything to alleviate his pain.  No interim injury.        Patient's past medical, surgical, social, and family histories were reviewed today and no changes are noted.    REVIEW OF SYSTEMS:  Constitutional: NEGATIVE for fever, chills, change in weight  Skin: NEGATIVE for worrisome rashes, moles or lesions  GI/: NEGATIVE for bowel or bladder changes  Neuro: NEGATIVE for weakness, dizziness or paresthesias    OBJECTIVE:  BP (!) 207/104   Pulse 88   Wt 55.3 kg (122 lb)   BMI 20.63 kg/m     General: healthy, alert and in no distress  HEENT: no scleral icterus or conjunctival erythema  Skin: no suspicious lesions or rash. No jaundice.  CV: regular rhythm by palpation, no pedal edema  Resp: normal respiratory effort without conversational dyspnea   Psych: normal mood and affect  Gait: normal steady gait with appropriate  Perfusion is good.  Light touch is intact.  Calf is nontender.  There is no joint effusion.  Range of motion is full.  He is stable to varus and valgus stress in both flexion and extension.  There is a negative drawer Lachman's test but there is no patellar apprehension.  Dial test is negative.  There is somewhat negative patellar tilt and tight lateral retinaculum.  Quadriceps angle is 18.    X-RAY INTERPRETATION: X-rays demonstrate no acute fractures or dislocations.  He has patella juanito.  There is a small area of calcification at the patellar tendon insertion    ASSESSMENT/PLAN      IMPRESSION:   Patellofemoral syndrome  Extensor malalignment    TREATMENT AND RECOMMENDATIONS: The diagnosis and treatment options were discussed with him.  Imaging studies were reviewed with him.  I would recommend therapy for VMO strengthening program.  He may also get a patellar stabilizing brace for at work.  I would work on this for 2 to 3 months.  If he is not improving then further workup with MRI scanning would be undertaken.  He is comfortable with this plan.    Thank you for this referral.       coordination and balance  Neuro: normal light touch sensory exam of the extremities.    MSK:    RIGHT SHOULDER  Inspection:    no swelling, bruising, discoloration, or obvious deformity or asymmetry  Palpation:    Tender about the proximal biceps tendon. Remainder of bony and tendinous landmarks are nontender.  Active Range of Motion:     Abduction 1650, FF 1650, , IR L1.    Strength:    Scapular plane abduction 5/5, painful,  ER 5/5, IR 5/5, biceps 5/5, triceps 5/5  Special Tests:    Positive: Neer's, Floyd', Burleigh's and Speed's    Negative: supraspinatus (empty can)     Independent visualization of the below image:    IMPRESSION:  1.  High-grade distal supraspinatus tendon tear measuring 9 mm in AP dimension. No retraction.     2.  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 Small amount of cystic fluid at the myotendinous junction.     3.  Partial-thickness tear distal, cranial fibers of the subscapularis tendon.     4.  Chronic-appearing attritional thinning and partial tear of the biceps tendon.     5.  Glenohumeral synovitis and capsulitis.     6.  Mild subacromial/subdeltoid bursitis.     7.  No fracture.    Gasper Davis MD, [facility]    Large Joint Injection/Arthocentesis: R subacromial bursa    Date/Time: 5/16/2022 2:19 PM  Performed by: Gasper Davis MD  Authorized by: Gasper Davis MD     Indications:  Pain  Needle Size:  25 G  Guidance: ultrasound    Approach:  Lateral  Location:  Shoulder      Site:  R subacromial bursa  Medications:  6 mg betamethasone acet & sod phos 6 (3-3) MG/ML; 4 mL ropivacaine 5 MG/ML  Medications comment:  Actual amount of ropivacaine used 4 mL  Outcome:  Tolerated well, no immediate complications  Procedure discussed: discussed risks, benefits, and alternatives    Consent Given by:  Patient  Timeout: timeout called immediately prior to procedure    Prep: patient  was prepped and draped in usual sterile fashion     Ultrasound images of procedure were permanently stored.     Medium Joint Injection/Arthrocentesis: R elbow    Date/Time: 5/16/2022 2:19 PM  Performed by: Gasper Davis MD  Authorized by: Gasper Davis MD     Needle Size:  25 G  Guidance: ultrasound    Approach:  Lateral  Location:  Elbow  Site:  R elbow  Medications:  2 mL ropivacaine 5 MG/ML; 6 mg betamethasone acet & sod phos 6 (3-3) MG/ML  Outcome:  Tolerated well, no immediate complications  Procedure discussed: discussed risks, benefits, and alternatives    Timeout: timeout called immediately prior to procedure    Prep: patient was prepped and draped in usual sterile fashion     Patient reported significant improvement of pain after the numbing portion right subacromial bursa and elbow joint injection.  Ultrasound guided images were permanently stored.   Aftercare instructions given to patient.  Plan to follow-up as discussed above.     Gasper Davis MD [facility]

## (undated) RX ORDER — DEXAMETHASONE SODIUM PHOSPHATE 10 MG/ML
INJECTION, SOLUTION INTRAMUSCULAR; INTRAVENOUS
Status: DISPENSED
Start: 2021-12-14

## (undated) RX ORDER — LIDOCAINE HYDROCHLORIDE 10 MG/ML
INJECTION, SOLUTION EPIDURAL; INFILTRATION; INTRACAUDAL; PERINEURAL
Status: DISPENSED
Start: 2021-12-14